# Patient Record
Sex: MALE | Race: WHITE | NOT HISPANIC OR LATINO | Employment: FULL TIME | URBAN - METROPOLITAN AREA
[De-identification: names, ages, dates, MRNs, and addresses within clinical notes are randomized per-mention and may not be internally consistent; named-entity substitution may affect disease eponyms.]

---

## 2021-10-02 ENCOUNTER — HOSPITAL ENCOUNTER (EMERGENCY)
Facility: HOSPITAL | Age: 31
Discharge: HOME | End: 2021-10-02
Attending: EMERGENCY MEDICINE

## 2021-10-02 VITALS
RESPIRATION RATE: 16 BRPM | HEART RATE: 87 BPM | DIASTOLIC BLOOD PRESSURE: 86 MMHG | OXYGEN SATURATION: 100 % | TEMPERATURE: 98.4 F | SYSTOLIC BLOOD PRESSURE: 126 MMHG

## 2021-10-02 DIAGNOSIS — R55 SYNCOPE: Primary | ICD-10-CM

## 2021-10-02 PROCEDURE — 99284 EMERGENCY DEPT VISIT MOD MDM: CPT

## 2021-10-02 PROCEDURE — 99285 EMERGENCY DEPT VISIT HI MDM: CPT | Performed by: EMERGENCY MEDICINE

## 2021-10-02 PROCEDURE — 93005 ELECTROCARDIOGRAM TRACING: CPT

## 2021-10-03 LAB
ATRIAL RATE: 84 BPM
P AXIS: 60 DEGREES
PR INTERVAL: 176 MS
QRS AXIS: 68 DEGREES
QRSD INTERVAL: 106 MS
QT INTERVAL: 338 MS
QTC INTERVAL: 399 MS
T WAVE AXIS: 63 DEGREES
VENTRICULAR RATE: 84 BPM

## 2021-10-03 PROCEDURE — 93010 ELECTROCARDIOGRAM REPORT: CPT | Performed by: INTERNAL MEDICINE

## 2022-11-17 ENCOUNTER — HOSPITAL ENCOUNTER (EMERGENCY)
Facility: HOSPITAL | Age: 32
Discharge: HOME | End: 2022-11-17
Attending: EMERGENCY MEDICINE

## 2022-11-17 VITALS
HEIGHT: 68 IN | BODY MASS INDEX: 21.98 KG/M2 | SYSTOLIC BLOOD PRESSURE: 138 MMHG | DIASTOLIC BLOOD PRESSURE: 90 MMHG | HEART RATE: 81 BPM | RESPIRATION RATE: 18 BRPM | OXYGEN SATURATION: 99 % | TEMPERATURE: 98.6 F | WEIGHT: 145 LBS

## 2022-11-17 DIAGNOSIS — K04.7 DENTAL INFECTION: Primary | ICD-10-CM

## 2022-11-17 RX ORDER — KETOROLAC TROMETHAMINE 30 MG/ML
15 INJECTION, SOLUTION INTRAMUSCULAR; INTRAVENOUS ONCE
Status: COMPLETED | OUTPATIENT
Start: 2022-11-17 | End: 2022-11-17

## 2022-11-17 RX ORDER — AMOXICILLIN 250 MG/1
500 CAPSULE ORAL ONCE
Status: COMPLETED | OUTPATIENT
Start: 2022-11-17 | End: 2022-11-17

## 2022-11-17 RX ORDER — AMOXICILLIN 500 MG/1
500 CAPSULE ORAL EVERY 12 HOURS SCHEDULED
Qty: 14 CAPSULE | Refills: 0 | Status: SHIPPED | OUTPATIENT
Start: 2022-11-17 | End: 2022-11-24

## 2022-11-17 RX ADMIN — KETOROLAC TROMETHAMINE 15 MG: 30 INJECTION, SOLUTION INTRAMUSCULAR; INTRAVENOUS at 21:52

## 2022-11-17 RX ADMIN — AMOXICILLIN 500 MG: 250 CAPSULE ORAL at 21:52

## 2022-11-18 NOTE — ED PROVIDER NOTES
History  Chief Complaint   Patient presents with   • Dental Pain     Left upper dental pain  Pt states he's had a cracked tooth "for awhile now" and it's been causing him pain since 11/16  800mg motrin taken at 45     30-year-old male presents for evaluation of acute on chronic left upper dental pain  Patient states this episode started 2 days ago  Tenderness on palpation  Reports taking ibuprofen a few hours ago with mild relief  Normal phonation  Denies fever, sore throat  None       No past medical history on file  No past surgical history on file  No family history on file  I have reviewed and agree with the history as documented  E-Cigarette/Vaping     E-Cigarette/Vaping Substances     Social History     Tobacco Use   • Smoking status: Every Day   • Smokeless tobacco: Never   Substance Use Topics   • Alcohol use: Never   • Drug use: Yes     Types: Marijuana       Review of Systems   Constitutional: Negative for fever  HENT: Positive for dental problem  All other systems reviewed and are negative  Physical Exam  Physical Exam  Vitals and nursing note reviewed  Constitutional:       General: He is not in acute distress  Appearance: He is well-developed  HENT:      Head: Normocephalic and atraumatic  Right Ear: External ear normal       Left Ear: External ear normal       Nose: Nose normal       Mouth/Throat:      Comments: Multiple dental caries  Left upper molar with severe decay  No evidence of palpable abscess  No submandibular or sublingual edema  Normal phonation  Posterior oropharynx clear, moist and symmetrical  Eyes:      General: No scleral icterus  Pulmonary:      Effort: Pulmonary effort is normal  No respiratory distress  Abdominal:      General: There is no distension  Palpations: Abdomen is soft  Musculoskeletal:         General: No deformity  Normal range of motion  Cervical back: Normal range of motion and neck supple     Skin: General: Skin is warm  Findings: No rash  Neurological:      General: No focal deficit present  Mental Status: He is alert  Gait: Gait normal    Psychiatric:         Mood and Affect: Mood normal          Vital Signs  ED Triage Vitals   Temperature Pulse Respirations Blood Pressure SpO2   11/17/22 2129 11/17/22 2129 11/17/22 2129 11/17/22 2129 11/17/22 2129   98 6 °F (37 °C) 81 18 138/90 99 %      Temp Source Heart Rate Source Patient Position - Orthostatic VS BP Location FiO2 (%)   11/17/22 2129 11/17/22 2129 11/17/22 2129 11/17/22 2129 --   Oral Monitor Sitting Right arm       Pain Score       11/17/22 2152       10 - Worst Possible Pain           Vitals:    11/17/22 2129   BP: 138/90   Pulse: 81   Patient Position - Orthostatic VS: Sitting         Visual Acuity      ED Medications  Medications   ketorolac (TORADOL) injection 15 mg (15 mg Intramuscular Given 11/17/22 2152)   amoxicillin (AMOXIL) capsule 500 mg (500 mg Oral Given 11/17/22 2152)       Diagnostic Studies  Results Reviewed     None                 No orders to display              Procedures  Procedures         ED Course                                             MDM  Number of Diagnoses or Management Options  Dental infection: new and requires workup  Diagnosis management comments: 34 yom with acute on chronic dental pain  Pain control, abx  FU with dentist  Luis Fernando Jhaveri discussed          Amount and/or Complexity of Data Reviewed  Clinical lab tests: reviewed  Tests in the medicine section of CPT®: ordered and reviewed  Decide to obtain previous medical records or to obtain history from someone other than the patient: yes  Review and summarize past medical records: yes        Disposition  Final diagnoses:   Dental infection     Time reflects when diagnosis was documented in both MDM as applicable and the Disposition within this note     Time User Action Codes Description Comment    11/17/2022  9:42 PM Silvino Baca Add [K04 7] Dental infection       ED Disposition     ED Disposition   Discharge    Condition   Stable    Date/Time   Thu Nov 17, 2022  9:42 PM    Comment   [address] [facility],[address] discharge to home/self care  Follow-up Information     Follow up With Specialties Details Why Contact Info Additional [address] [facility]   [address] [address]  50 [facility] Emergency Department Emergency Medicine  If symptoms worsen [address] [id number]  [address] [facility] Emergency Department, [address] [place] [address] Keyanna Berkowitz Luige Kem [address]          Discharge Medication List as of 11/17/2022  9:42 PM      START taking these medications    Details   amoxicillin (AMOXIL) 500 mg capsule Take 1 capsule (500 mg total) by mouth every 12 (twelve) hours for 7 days, Starting Thu 11/17/2022, Until Thu 11/24/2022, Normal             No discharge procedures on file      PDMP Review     None          ED Provider  Electronically Signed by           Deedee Mccarthy DO  11/17/22 8872

## 2023-10-26 ENCOUNTER — HOSPITAL ENCOUNTER (INPATIENT)
Facility: HOSPITAL | Age: 33
LOS: 1 days | Discharge: HOME | End: 2023-10-27
Attending: EMERGENCY MEDICINE | Admitting: INTERNAL MEDICINE
Payer: COMMERCIAL

## 2023-10-26 ENCOUNTER — APPOINTMENT (EMERGENCY)
Dept: CT IMAGING | Facility: HOSPITAL | Age: 33
End: 2023-10-26
Payer: COMMERCIAL

## 2023-10-26 VITALS
OXYGEN SATURATION: 100 % | RESPIRATION RATE: 16 BRPM | HEIGHT: 68 IN | TEMPERATURE: 97.3 F | BODY MASS INDEX: 20.46 KG/M2 | WEIGHT: 135 LBS | HEART RATE: 57 BPM | DIASTOLIC BLOOD PRESSURE: 81 MMHG | SYSTOLIC BLOOD PRESSURE: 127 MMHG

## 2023-10-26 DIAGNOSIS — K52.9 ILEITIS: ICD-10-CM

## 2023-10-26 DIAGNOSIS — K63.0: Primary | ICD-10-CM

## 2023-10-26 PROBLEM — F17.210 CIGARETTE NICOTINE DEPENDENCE WITHOUT COMPLICATION: Status: ACTIVE | Noted: 2023-10-26

## 2023-10-26 LAB
ALBUMIN SERPL BCP-MCNC: 4.4 G/DL (ref 3.5–5)
ALP SERPL-CCNC: 104 U/L (ref 34–104)
ALT SERPL W P-5'-P-CCNC: 24 U/L (ref 7–52)
ANION GAP SERPL CALCULATED.3IONS-SCNC: 5 MMOL/L
AST SERPL W P-5'-P-CCNC: 20 U/L (ref 13–39)
BACTERIA UR QL AUTO: NORMAL /HPF
BASOPHILS # BLD AUTO: 0.09 THOUSANDS/ÂΜL (ref 0–0.1)
BASOPHILS NFR BLD AUTO: 1 % (ref 0–1)
BILIRUB SERPL-MCNC: 0.61 MG/DL (ref 0.2–1)
BILIRUB UR QL STRIP: NEGATIVE
BUN SERPL-MCNC: 13 MG/DL (ref 5–25)
CALCIUM SERPL-MCNC: 9.7 MG/DL (ref 8.4–10.2)
CHLORIDE SERPL-SCNC: 103 MMOL/L (ref 96–108)
CLARITY UR: CLEAR
CO2 SERPL-SCNC: 29 MMOL/L (ref 21–32)
COLOR UR: YELLOW
CREAT SERPL-MCNC: 0.92 MG/DL (ref 0.6–1.3)
EOSINOPHIL # BLD AUTO: 0.33 THOUSAND/ÂΜL (ref 0–0.61)
EOSINOPHIL NFR BLD AUTO: 3 % (ref 0–6)
ERYTHROCYTE [DISTWIDTH] IN BLOOD BY AUTOMATED COUNT: 12.7 % (ref 11.6–15.1)
GFR SERPL CREATININE-BSD FRML MDRD: 108 ML/MIN/1.73SQ M
GLUCOSE SERPL-MCNC: 138 MG/DL (ref 65–140)
GLUCOSE UR STRIP-MCNC: NEGATIVE MG/DL
HCT VFR BLD AUTO: 41.4 % (ref 36.5–49.3)
HGB BLD-MCNC: 13.4 G/DL (ref 12–17)
HGB UR QL STRIP.AUTO: NEGATIVE
IMM GRANULOCYTES # BLD AUTO: 0.06 THOUSAND/UL (ref 0–0.2)
IMM GRANULOCYTES NFR BLD AUTO: 1 % (ref 0–2)
KETONES UR STRIP-MCNC: NEGATIVE MG/DL
LEUKOCYTE ESTERASE UR QL STRIP: NEGATIVE
LIPASE SERPL-CCNC: 18 U/L (ref 11–82)
LYMPHOCYTES # BLD AUTO: 2.56 THOUSANDS/ÂΜL (ref 0.6–4.47)
LYMPHOCYTES NFR BLD AUTO: 22 % (ref 14–44)
MCH RBC QN AUTO: 28 PG (ref 26.8–34.3)
MCHC RBC AUTO-ENTMCNC: 32.4 G/DL (ref 31.4–37.4)
MCV RBC AUTO: 86 FL (ref 82–98)
MONOCYTES # BLD AUTO: 1.09 THOUSAND/ÂΜL (ref 0.17–1.22)
MONOCYTES NFR BLD AUTO: 9 % (ref 4–12)
NEUTROPHILS # BLD AUTO: 7.44 THOUSANDS/ÂΜL (ref 1.85–7.62)
NEUTS SEG NFR BLD AUTO: 64 % (ref 43–75)
NITRITE UR QL STRIP: NEGATIVE
NON-SQ EPI CELLS URNS QL MICRO: NORMAL /HPF
NRBC BLD AUTO-RTO: 0 /100 WBCS
PH UR STRIP.AUTO: 6 [PH]
PLATELET # BLD AUTO: 370 THOUSANDS/UL (ref 149–390)
PMV BLD AUTO: 9.2 FL (ref 8.9–12.7)
POTASSIUM SERPL-SCNC: 3.8 MMOL/L (ref 3.5–5.3)
PROT SERPL-MCNC: 8 G/DL (ref 6.4–8.4)
PROT UR STRIP-MCNC: ABNORMAL MG/DL
RBC # BLD AUTO: 4.79 MILLION/UL (ref 3.88–5.62)
RBC #/AREA URNS AUTO: NORMAL /HPF
SODIUM SERPL-SCNC: 137 MMOL/L (ref 135–147)
SP GR UR STRIP.AUTO: >=1.03 (ref 1–1.03)
UROBILINOGEN UR STRIP-ACNC: <2 MG/DL
WBC # BLD AUTO: 11.57 THOUSAND/UL (ref 4.31–10.16)
WBC #/AREA URNS AUTO: NORMAL /HPF

## 2023-10-26 PROCEDURE — 96361 HYDRATE IV INFUSION ADD-ON: CPT

## 2023-10-26 PROCEDURE — 99285 EMERGENCY DEPT VISIT HI MDM: CPT | Performed by: PHYSICIAN ASSISTANT

## 2023-10-26 PROCEDURE — 80053 COMPREHEN METABOLIC PANEL: CPT | Performed by: PHYSICIAN ASSISTANT

## 2023-10-26 PROCEDURE — 96360 HYDRATION IV INFUSION INIT: CPT

## 2023-10-26 PROCEDURE — 74177 CT ABD & PELVIS W/CONTRAST: CPT

## 2023-10-26 PROCEDURE — 36415 COLL VENOUS BLD VENIPUNCTURE: CPT | Performed by: PHYSICIAN ASSISTANT

## 2023-10-26 PROCEDURE — 99222 1ST HOSP IP/OBS MODERATE 55: CPT | Performed by: INTERNAL MEDICINE

## 2023-10-26 PROCEDURE — 81001 URINALYSIS AUTO W/SCOPE: CPT | Performed by: PHYSICIAN ASSISTANT

## 2023-10-26 PROCEDURE — 85025 COMPLETE CBC W/AUTO DIFF WBC: CPT | Performed by: PHYSICIAN ASSISTANT

## 2023-10-26 PROCEDURE — 99284 EMERGENCY DEPT VISIT MOD MDM: CPT

## 2023-10-26 PROCEDURE — 99223 1ST HOSP IP/OBS HIGH 75: CPT | Performed by: INTERNAL MEDICINE

## 2023-10-26 PROCEDURE — G1004 CDSM NDSC: HCPCS

## 2023-10-26 PROCEDURE — 83690 ASSAY OF LIPASE: CPT | Performed by: PHYSICIAN ASSISTANT

## 2023-10-26 RX ORDER — ACETAMINOPHEN 325 MG/1
650 TABLET ORAL EVERY 6 HOURS PRN
Status: DISCONTINUED | OUTPATIENT
Start: 2023-10-26 | End: 2023-10-27 | Stop reason: HOSPADM

## 2023-10-26 RX ORDER — NICOTINE 21 MG/24HR
1 PATCH, TRANSDERMAL 24 HOURS TRANSDERMAL DAILY
Status: DISCONTINUED | OUTPATIENT
Start: 2023-10-26 | End: 2023-10-27 | Stop reason: HOSPADM

## 2023-10-26 RX ORDER — METRONIDAZOLE 500 MG/1
500 TABLET ORAL ONCE
Status: COMPLETED | OUTPATIENT
Start: 2023-10-26 | End: 2023-10-26

## 2023-10-26 RX ORDER — METRONIDAZOLE 500 MG/1
500 TABLET ORAL EVERY 8 HOURS SCHEDULED
Status: DISCONTINUED | OUTPATIENT
Start: 2023-10-26 | End: 2023-10-27 | Stop reason: HOSPADM

## 2023-10-26 RX ORDER — CEFTRIAXONE 1 G/50ML
1000 INJECTION, SOLUTION INTRAVENOUS EVERY 24 HOURS
Status: DISCONTINUED | OUTPATIENT
Start: 2023-10-27 | End: 2023-10-27 | Stop reason: HOSPADM

## 2023-10-26 RX ORDER — CEFTRIAXONE 1 G/50ML
1000 INJECTION, SOLUTION INTRAVENOUS ONCE
Status: COMPLETED | OUTPATIENT
Start: 2023-10-26 | End: 2023-10-26

## 2023-10-26 RX ORDER — SODIUM CHLORIDE 9 MG/ML
100 INJECTION, SOLUTION INTRAVENOUS CONTINUOUS
Status: DISCONTINUED | OUTPATIENT
Start: 2023-10-26 | End: 2023-10-27 | Stop reason: HOSPADM

## 2023-10-26 RX ADMIN — IOHEXOL 100 ML: 350 INJECTION, SOLUTION INTRAVENOUS at 11:38

## 2023-10-26 RX ADMIN — SODIUM CHLORIDE 100 ML/HR: 0.9 INJECTION, SOLUTION INTRAVENOUS at 17:13

## 2023-10-26 RX ADMIN — SODIUM CHLORIDE 1000 ML: 0.9 INJECTION, SOLUTION INTRAVENOUS at 10:50

## 2023-10-26 RX ADMIN — METRONIDAZOLE 500 MG: 500 TABLET ORAL at 21:54

## 2023-10-26 RX ADMIN — METRONIDAZOLE 500 MG: 500 TABLET ORAL at 14:59

## 2023-10-26 RX ADMIN — CEFTRIAXONE 1000 MG: 1 INJECTION, SOLUTION INTRAVENOUS at 14:59

## 2023-10-26 RX ADMIN — Medication 1 PATCH: at 17:10

## 2023-10-26 NOTE — PLAN OF CARE
Problem: PAIN - ADULT  Goal: Verbalizes/displays adequate comfort level or baseline comfort level  Description: Interventions:  - Encourage patient to monitor pain and request assistance  - Assess pain using appropriate pain scale  - Administer analgesics based on type and severity of pain and evaluate response  - Implement non-pharmacological measures as appropriate and evaluate response  - Consider cultural and social influences on pain and pain management  - Notify physician/advanced practitioner if interventions unsuccessful or patient reports new pain  Outcome: Progressing     Problem: INFECTION - ADULT  Goal: Absence or prevention of progression during hospitalization  Description: INTERVENTIONS:  - Assess and monitor for signs and symptoms of infection  - Monitor lab/diagnostic results  - Monitor all insertion sites, i.e. indwelling lines, tubes, and drains  - Monitor endotracheal if appropriate and nasal secretions for changes in amount and color  - [place] appropriate cooling/warming therapies per order  - Administer medications as ordered  - Instruct and encourage patient and family to use good hand hygiene technique  - Identify and instruct in appropriate isolation precautions for identified infection/condition  Outcome: Progressing     Problem: SAFETY ADULT  Goal: Patient will remain free of falls  Description: INTERVENTIONS:  - Educate patient/family on patient safety including physical limitations  - Instruct patient to call for assistance with activity   - Consult OT/PT to assist with strengthening/mobility   - Keep Call bell within reach  - Keep bed low and locked with side rails adjusted as appropriate  - Keep care items and personal belongings within reach  - Initiate and maintain comfort rounds  - Make Fall Risk Sign visible to staff  - Offer Toileting every 2 Hours, in advance of need  - Initiate/Maintain alarm  - Obtain necessary fall risk management equipment  - Apply yellow socks and bracelet for high fall risk patients  - Consider moving patient to room near nurses station  Outcome: Progressing  Goal: Maintain or return to baseline ADL function  Description: INTERVENTIONS:  -  Assess patient's ability to carry out ADLs; assess patient's baseline for ADL function and identify physical deficits which impact ability to perform ADLs (bathing, care of mouth/teeth, toileting, grooming, dressing, etc.)  - Assess/evaluate cause of self-care deficits   - Assess range of motion  - Assess patient's mobility; develop plan if impaired  - Assess patient's need for assistive devices and provide as appropriate  - Encourage maximum independence but intervene and supervise when necessary  - Involve family in performance of ADLs  - Assess for home care needs following discharge   - Consider OT consult to assist with ADL evaluation and planning for discharge  - Provide patient education as appropriate  Outcome: Progressing  Goal: Maintains/Returns to pre admission functional level  Description: INTERVENTIONS:  - Perform BMAT or MOVE assessment daily.   - Set and communicate daily mobility goal to care team and patient/family/caregiver. - Collaborate with rehabilitation services on mobility goals if consulted  - Perform Range of Motion 3 times a day. - Reposition patient every 3 hours.   - Dangle patient 3 times a day  - Stand patient 3 times a day  - Ambulate patient 3 times a day  - Out of bed to chair 3 times a day   - Out of bed for meals 3 times a day  - Out of bed for toileting  - Record patient progress and toleration of activity level   Outcome: Progressing     Problem: DISCHARGE PLANNING  Goal: Discharge to home or other facility with appropriate resources  Description: INTERVENTIONS:  - Identify barriers to discharge w/patient and caregiver  - Arrange for needed discharge resources and transportation as appropriate  - Identify discharge learning needs (meds, wound care, etc.)  - Arrange for interpretive services to assist at discharge as needed  - Refer to Case Management Department for coordinating discharge planning if the patient needs post-hospital services based on physician/advanced practitioner order or complex needs related to functional status, cognitive ability, or social support system  Outcome: Progressing     Problem: Knowledge Deficit  Goal: Patient/family/caregiver demonstrates understanding of disease process, treatment plan, medications, and discharge instructions  Description: Complete learning assessment and assess knowledge base.   Interventions:  - Provide teaching at level of understanding  - Provide teaching via preferred learning methods  Outcome: Progressing

## 2023-10-26 NOTE — ED PROVIDER NOTES
History  Chief Complaint   Patient presents with    Abdominal Pain     Patient presents to the ER with report of having low abdominal pain. Patient is a 79-year-old white male with no past medical history who reports 2-week history of mid lower abdominal pain. States the pain is intermittent and radiates to his rectum when he coughs. States he works as a . States the pain increased after doing drywall this past weekend. No fever or shaking chills. No nausea or vomiting. No urinary symptoms. No diarrhea or constipation. He reports he did have a colonoscopy at [facility] 3 years ago when he was having blood in his stools. Patient reports the results were "inconclusive". States he smokes marijuana every day. Denies other illicit drug use. Denies alcohol. Max half pack per day cigarettes. None       History reviewed. No pertinent past medical history. History reviewed. No pertinent surgical history. History reviewed. No pertinent family history. I have reviewed and agree with the history as documented. E-Cigarette/Vaping     E-Cigarette/Vaping Substances     Social History     Tobacco Use    Smoking status: Every Day     Packs/day: 0.50     Types: Cigarettes    Smokeless tobacco: Never   Substance Use Topics    Alcohol use: Never    Drug use: Yes     Types: Marijuana       Review of Systems   Constitutional:  Negative for chills and fever. HENT:  Negative for ear pain and sore throat. Eyes:  Negative for pain. Respiratory:  Negative for cough and shortness of breath. Cardiovascular:  Negative for chest pain and palpitations. Gastrointestinal:  Positive for abdominal pain. Negative for constipation, diarrhea, nausea and vomiting. Genitourinary:  Negative for dysuria and hematuria. Musculoskeletal:  Negative for arthralgias and back pain. Skin:  Negative for color change and rash. Neurological:  Negative for syncope.    All other systems reviewed and are negative. Physical Exam  Physical Exam  Vitals and nursing note reviewed. Constitutional:       General: He is not in acute distress. Appearance: He is well-developed. He is not ill-appearing, toxic-appearing or diaphoretic. HENT:      Head: Normocephalic and atraumatic. Mouth/Throat:      Mouth: Mucous membranes are moist.      Pharynx: Oropharynx is clear. Eyes:      Extraocular Movements: Extraocular movements intact. Pupils: Pupils are equal, round, and reactive to light. Cardiovascular:      Rate and Rhythm: Normal rate and regular rhythm. Heart sounds: Normal heart sounds. Pulmonary:      Effort: Pulmonary effort is normal.      Breath sounds: Normal breath sounds. Abdominal:      General: Abdomen is flat. Bowel sounds are normal.      Palpations: Abdomen is soft. Comments: Tenderness to deep palpation mid lower abdomen   Skin:     General: Skin is warm and dry. Capillary Refill: Capillary refill takes less than 2 seconds. Neurological:      Mental Status: He is alert and oriented to person, place, and time.          Vital Signs  ED Triage Vitals [10/26/23 1023]   Temperature Pulse Respirations Blood Pressure SpO2   98.2 °F (36.8 °C) 96 18 126/80 99 %      Temp Source Heart Rate Source Patient Position - Orthostatic VS BP Location FiO2 (%)   Oral Monitor Sitting Right arm --      Pain Score       --           Vitals:    10/26/23 1023   BP: 126/80   Pulse: 96   Patient Position - Orthostatic VS: Sitting         Visual Acuity      ED Medications  Medications   sodium chloride 0.9 % bolus 1,000 mL (1,000 mL Intravenous New Bag 10/26/23 1050)   iohexol (OMNIPAQUE) 350 MG/ML injection (MULTI-DOSE) 100 mL (100 mL Intravenous Given 10/26/23 1138)       Diagnostic Studies  Results Reviewed       Procedure Component Value Units Date/Time    Lipase [[id number]]  (Normal) Collected: 10/26/23 1050    Lab Status: Final result Specimen: Blood from Arm, Left Updated: 10/26/23 1119     Lipase 18 u/L     Comprehensive metabolic panel [[id number]] Collected: 10/26/23 1050    Lab Status: Final result Specimen: Blood from Arm, Left Updated: 10/26/23 1119     Sodium 137 mmol/L      Potassium 3.8 mmol/L      Chloride 103 mmol/L      CO2 29 mmol/L      ANION GAP 5 mmol/L      BUN 13 mg/dL      Creatinine 0.92 mg/dL      Glucose 138 mg/dL      Calcium 9.7 mg/dL      AST 20 U/L      ALT 24 U/L      Alkaline Phosphatase 104 U/L      Total Protein 8.0 g/dL      Albumin 4.4 g/dL      Total Bilirubin 0.61 mg/dL      eGFR 108 ml/min/1.73sq m     Narrative:      National Kidney Disease Foundation guidelines for Chronic Kidney Disease (CKD):     Stage 1 with normal or high GFR (GFR > 90 mL/min/1.73 square meters)    Stage 2 Mild CKD (GFR = 60-89 mL/min/1.73 square meters)    Stage 3A Moderate CKD (GFR = 45-59 mL/min/1.73 square meters)    Stage 3B Moderate CKD (GFR = 30-44 mL/min/1.73 square meters)    Stage 4 Severe CKD (GFR = 15-29 mL/min/1.73 square meters)    Stage 5 End Stage CKD (GFR <15 mL/min/1.73 square meters)  Note: GFR calculation is accurate only with a steady state creatinine    Urine Microscopic [[id number]]  (Normal) Collected: 10/26/23 1047    Lab Status: Final result Specimen: Urine, Clean Catch Updated: 10/26/23 1112     RBC, UA 0-1 /hpf      WBC, UA 1-2 /hpf      Epithelial Cells Occasional /hpf      Bacteria, UA Occasional /hpf     CBC and differential [[id number]]  (Abnormal) Collected: 10/26/23 1050    Lab Status: Final result Specimen: Blood from Arm, Left Updated: 10/26/23 1100     WBC 11.57 Thousand/uL      RBC 4.79 Million/uL      Hemoglobin 13.4 g/dL      Hematocrit 41.4 %      MCV 86 fL      MCH 28.0 pg      MCHC 32.4 g/dL      RDW 12.7 %      MPV 9.2 fL      Platelets 966 Thousands/uL      nRBC 0 /100 WBCs      Neutrophils Relative 64 %      Immat GRANS % 1 %      Lymphocytes Relative 22 %      Monocytes Relative 9 %      Eosinophils Relative 3 %      Basophils Relative 1 % Neutrophils Absolute 7.44 Thousands/µL      Immature Grans Absolute 0.06 Thousand/uL      Lymphocytes Absolute 2.56 Thousands/µL      Monocytes Absolute 1.09 Thousand/µL      Eosinophils Absolute 0.33 Thousand/µL      Basophils Absolute 0.09 Thousands/µL     UA w Reflex to Microscopic w Reflex to Culture [[id number]]  (Abnormal) Collected: 10/26/23 1047    Lab Status: Final result Specimen: Urine, Clean Catch Updated: 10/26/23 1059     Color, UA Yellow     Clarity, UA Clear     Specific Gravity, UA >=1.030     pH, UA 6.0     Leukocytes, UA Negative     Nitrite, UA Negative     Protein, UA Trace mg/dl      Glucose, UA Negative mg/dl      Ketones, UA Negative mg/dl      Urobilinogen, UA <2.0 mg/dl      Bilirubin, UA Negative     Occult Blood, UA Negative                   CT abdomen pelvis with contrast    by Jovani Veronica DO (10/26 1307)                 Procedures  Procedures         ED Course                               SBIRT 20yo+      Flowsheet Row Most Recent Value   Initial Alcohol Screen: US AUDIT-C     1. How often do you have a drink containing alcohol? 0 Filed at: 10/26/2023 1026   Audit-C Score 0 Filed at: 10/26/2023 1026   DHARA: How many times in the past year have you. .. Used an illegal drug or used a prescription medication for non-medical reasons? Never Filed at: 10/26/2023 1026                      Medical Decision Making  I have considered appendicitis, diverticulitis, IBS on my differential diagnosis. Labs reviewed. Patient with mild leukocytosis with white blood cell count of 11.57. CT abdomen/shows a bilobed abscess in the ileum with a fistulous tract. Radiologist contacted me to give me this result. States it appears too deep for IR intervention. Recommends GI and surgery consultation. I Carmi texted hospitalist for admission. I consulted general surgeon Dr. Clarissa Davis and gastroenterologist Dr. Leonard Quiros. IV Rocephin and Flagyl ordered.     Amount and/or Complexity of Data Reviewed  Labs: ordered. Radiology: ordered. Risk  Prescription drug management. Decision regarding hospitalization. Disposition  Final diagnoses:   None     ED Disposition       None          Follow-up Information    None         Patient's Medications    No medications on file       No discharge procedures on file.     PDMP Review       None            ED Provider  Electronically Signed by             Tavon Dahl PA-C  10/26/23 8324 Azathioprine Pregnancy And Lactation Text: This medication is Pregnancy Category D and isn't considered safe during pregnancy. It is unknown if this medication is excreted in breast milk.

## 2023-10-26 NOTE — ASSESSMENT & PLAN NOTE
Presented to the emergency department with worsening lower abdominal pain. Patient reports off-and-on abdominal pain for several years. Patient reports colonoscopy 3 years ago but pt reports was inconclusive  CT abdomen/pelvis: Terminal ileitis. Present on CT from 2019. Added finding of bilobed abscess collection with fistulous tract. Suggestive of Crohn's disease. Skip areas noted as well. No lizzie perforation or obstruction. Started on IV Rocephin/Flagyl - continue  Mild leukocytosis. Does not meet any SIRS criteria.   Afebrile  Consult general surgery and gastroenterology  Per ED discussion with IR, not amenable to drain  Discussed with GI - trial clear liquid diet

## 2023-10-26 NOTE — H&P
Fungal Skin Infection (Tinea)  A fungal infection is when too much fungus grows on or in the body. Fungus normally lives on the skin in small amounts and does not cause harm. But when too much grows on the skin, it causes an infection. This is also known as tinea. Fungal skin infections are common and not often serious.  The infection often starts as a small red area the size of a pea. The skin may turn dry and flaky. The area may itch. As the fungus grows, it spreads out in a red [place]. Because of how it looks, fungal skin infection is often called ringworm, but it is not caused by a worm. Fungal skin infections can occur on many parts of the body. They can grow on the head, chest, arms, or legs. They can occur on the buttocks. On the feet, fungal infection is known as “athlete’s foot.” It causes itchy, sometimes painful sores between the toes and the bottom or sides of the feet. In the groin, the rash is called “jock itch.”  People with weakened immune systems can get a fungal infection more easily. This includes people with diabetes or HIV, or who are being treated for cancer. In these cases, the fungal infection can spread and cause severe illness. Fungal infections are also more common in people who are obese.  In most cases, treatment is done with antifungal cream or ointment. If the infection is on your scalp, you may take oral medication. In some cases, a tiny piece of the skin (biopsy) may be taken. This is so it can be tested in a lab.  Common fungal infections are treated with creams on the skin or oral medicine.  Home care  Follow all instructions when using antifungal cream or ointment on your skin. The health care provider may advise using cornstarch powder to keep your skin dry or petroleum jelly to provide a barrier.  General care:  · If you were prescribed an oral medicine, read the patient information. Talk with the health care provider about the risks and side effects.  · Let your skin dry  [id number]  H&P  Name: Oscar Gonzalez 34 y.o. male I MRN: [id number]  Unit/Bed#: Z1 H1 I Date of Admission: 10/26/2023   Date of Service: 10/26/2023 I Hospital Day: 0      Assessment/Plan   * Intestinal abscess  Assessment & Plan  Presented to the emergency department with worsening lower abdominal pain. Patient reports off-and-on abdominal pain for several years. Patient reports colonoscopy 3 years ago but pt reports was inconclusive  CT abdomen/pelvis: Terminal ileitis. Present on CT from 2019. Added finding of bilobed abscess collection with fistulous tract. Suggestive of Crohn's disease. Skip areas noted as well. No lizzie perforation or obstruction. Started on IV Rocephin/Flagyl - continue  Mild leukocytosis. Does not meet any SIRS criteria. Afebrile  Consult general surgery and gastroenterology  Per ED discussion with IR, not amenable to drain  Discussed with GI - trial clear liquid diet    Cigarette nicotine dependence without complication  Assessment & Plan  Nicotine patch ordered       VTE Pharmacologic Prophylaxis: VTE Score: 2 Low Risk (Score 0-2) - Encourage Ambulation. Code Status: Level 1 - Full Code   Discussion with family: Patient declined call to . Anticipated Length of Stay: Patient will be admitted on an inpatient basis with an anticipated length of stay of greater than 2 midnights secondary to intestinal abscess. Total Time Spent on Date of Encounter in care of patient: 75 mins. This time was spent on one or more of the following: performing physical exam; counseling and coordination of care; obtaining or reviewing history; documenting in the medical record; reviewing/ordering tests, medications or procedures; communicating with other healthcare professionals and discussing with patient's family/caregivers.     Chief Complaint: Abdominal pain    History of Present Illness:  Oscar Gonzalez is a 34 y.o. male with a PMH of recurrent abdominal pain, nicotine use who presents with abdominal pain. Patient without known significant past medical history. Presented to the emergency department due to worsening lower abdominal pain. Patient states overall he has had intermittent abdominal pain for at least several years however worse in the last 2 weeks. Pain appears to be intermittent and at times radiates to his rectum. Currently denies chest pain/palpitations, shortness of breath, nausea/vomiting. Abdominal pain is well controlled. He has not had any issues with unintentional weight loss. Had a colonoscopy about 3 years ago and states the results had been 'inconclusive'. Denies recent constipation or diarrhea. Denies fever/chills. Denies bloody or melanotic stools. Review of Systems:  Review of Systems   Constitutional:  Negative for chills, fatigue, fever and unexpected weight change. HENT:  Negative for congestion, sore throat and trouble swallowing. Eyes:  Negative for photophobia, pain and visual disturbance. Respiratory:  Negative for cough, shortness of breath and wheezing. Cardiovascular:  Negative for chest pain, palpitations and leg swelling. Gastrointestinal:  Positive for abdominal pain. Negative for constipation, diarrhea, nausea and vomiting. Endocrine: Negative for polyuria. Genitourinary:  Negative for difficulty urinating, dysuria, flank pain, hematuria and urgency. Musculoskeletal:  Negative for back pain, myalgias, neck pain and neck stiffness. Skin:  Negative for pallor and rash. Neurological:  Negative for dizziness, tremors, syncope, speech difficulty, weakness, light-headedness and headaches. Hematological:  Does not bruise/bleed easily. Psychiatric/Behavioral:  Negative for agitation and confusion. Past Medical and Surgical History:   History reviewed. No pertinent past medical history. History reviewed.  No pertinent surgical completely after bathing. Carefully dry your feet and between your toes.  · Dress in loose cotton clothing.  · Don’t scratch the affected area. This can delay healing and may spread the infection. It can also cause a bacterial infection.  · Keep your skin clean, but don’t wash the skin too much. This can irritate your skin.  · Keep in mind that it may take a week before the fungus starts to go away. It can take 2 to 4 weeks to fully clear. To prevent it from coming back, use the medicine until the rash is all gone.  Follow-up care  Follow up with your health care provider if the rash does not get better after 10 days of treatment. Also follow up if the rash spreads to other parts of your body.  When to seek medical advice  Call your health care provider right away if any of these occur:  · Fever of 100.4°F (38°C) or higher  · Redness or swelling that gets worse  · Pain that gets worse  · Foul-smelling fluid leaking from the skin     © 4361-9104 [employer]. [address] [place] [address], [place] [address]. All rights reserved. This information is not intended as a substitute for professional medical care. Always follow your healthcare professional's instructions.         history. Meds/Allergies:  Prior to Admission medications    Not on File     I have reviewed home medications with patient personally. Allergies: Allergies   Allergen Reactions    Ammonia Nasal Congestion       Social History:  Marital Status: Single   Occupation: [employer]  Patient Pre-hospital Living Situation: Home  Patient Pre-hospital Level of Mobility: walks  Patient Pre-hospital Diet Restrictions:   Substance Use History:   Social History     Substance and Sexual Activity   Alcohol Use Never     Social History     Tobacco Use   Smoking Status Every Day    Packs/day: 0.50    Types: Cigarettes   Smokeless Tobacco Never     Social History     Substance and Sexual Activity   Drug Use Yes    Types: Marijuana       Family History:  History reviewed. No pertinent family history. Physical Exam:     Vitals:   Blood Pressure: 126/80 (10/26/23 1023)  Pulse: 96 (10/26/23 1023)  Temperature: 98.2 °F (36.8 °C) (10/26/23 1023)  Temp Source: Oral (10/26/23 1023)  Respirations: 18 (10/26/23 1023)  Height: 5' 8" (172.7 cm) (10/26/23 1023)  Weight - Scale: 61.2 kg (135 lb) (10/26/23 1023)  SpO2: 99 % (10/26/23 1023)    Physical Exam  Vitals and nursing note reviewed. Constitutional:       General: He is not in acute distress. Appearance: He is well-developed. Comments: No acute distress   HENT:      Head: Normocephalic and atraumatic. Eyes:      General: No scleral icterus. Extraocular Movements: Extraocular movements intact. Conjunctiva/sclera: Conjunctivae normal.   Cardiovascular:      Rate and Rhythm: Normal rate and regular rhythm. Heart sounds: No murmur heard. Pulmonary:      Effort: Pulmonary effort is normal. No respiratory distress. Breath sounds: Normal breath sounds. No wheezing, rhonchi or rales. Abdominal:      General: Bowel sounds are normal.      Palpations: Abdomen is soft. Tenderness:  There is abdominal tenderness in the right lower quadrant and periumbilical area. There is no guarding or rebound. Musculoskeletal:         General: No swelling. Cervical back: Normal range of motion. Comments: Able to move upper/lower extremities bilaterally, no edema   Skin:     General: Skin is warm and dry. Capillary Refill: Capillary refill takes less than 2 seconds. Neurological:      Mental Status: He is alert and oriented to person, place, and time. Psychiatric:         Mood and Affect: Mood normal.         Speech: Speech normal.         Behavior: Behavior normal.          Additional Data:     Lab Results:  Results from last 7 days   Lab Units 10/26/23  1050   WBC Thousand/uL 11.57*   HEMOGLOBIN g/dL 13.4   HEMATOCRIT % 41.4   PLATELETS Thousands/uL 370   NEUTROS PCT % 64   LYMPHS PCT % 22   MONOS PCT % 9   EOS PCT % 3     Results from last 7 days   Lab Units 10/26/23  1050   SODIUM mmol/L 137   POTASSIUM mmol/L 3.8   CHLORIDE mmol/L 103   CO2 mmol/L 29   BUN mg/dL 13   CREATININE mg/dL 0.92   ANION GAP mmol/L 5   CALCIUM mg/dL 9.7   ALBUMIN g/dL 4.4   TOTAL BILIRUBIN mg/dL 0.61   ALK PHOS U/L 104   ALT U/L 24   AST U/L 20   GLUCOSE RANDOM mg/dL 138                       Lines/Drains:  Invasive Devices       Peripheral Intravenous Line  Duration             Peripheral IV 10/26/23 Left Antecubital <1 day                        Imaging: Reviewed radiology reports from this admission including: abdominal/pelvic CT  CT abdomen pelvis with contrast   Final Result by Claude Kevin DO (10/26 9689)   Exam limited without oral contrast.      Terminal ileitis was mentioned on a prior outside CT from September 2019. Diffuse ileitis is seen on this CT with the added finding of an adjacent bilobed abscess collection with fistulous tract extending caudally back to abnormal mid ileum. The appearance is suggestive of but not diagnostic of Crohn's disease. There also    appear to be skip areas which would also raise concern for Crohn's disease.  No lizzie perforation or obstruction. *No formal diagnosis of Crohn's disease in patient's chart. Recommend surgical and GI consultations. I personally discussed this study with Gildardo Horan on 10/26/2023 1:17 PM.            Workstation performed: [id number]             EKG and Other Studies Reviewed on Admission:   EKG: No EKG obtained. ** Please Note: This note has been constructed using a voice recognition system.  **

## 2023-10-26 NOTE — CONSULTS
Consultation - General Surgery   Vannessa Roca 34 y.o. male MRN: [id number]  Unit/Bed#: Z1 H1 Encounter: [id number]               Assessment/Plan     Abdominal Pain  Terminal ileitis was mentioned on a prior outside CT from September 2019. Diffuse ileitis is seen on this CT with the added finding of an adjacent bilobed abscess collection with fistulous tract extending caudally back to abnormal mid ileum. The appearance is suggestive of but not diagnostic of Crohn's disease. There also   appear to be skip areas which would also raise concern for Crohn's disease. No lizzie perforation or obstruction. -67-year-old male with prior history of ileitis on CT scan and colonoscopy 2019 showed TI with inflammation, edema and ulceration. Biopsies were negative for colitis. No further GI follow-up since then.  -antibiotics for abscess  -diet as per GI  -IVF  -pain control  -needs further workup, patient has findings noemi[icious for Crohn's disease        History of Present Illness    Admit Date:  10/26/2023  Hospital Day:  0 days  Primary Service:  Hospitalist  Attending Provider:  Lisa Pantoja MD  Physician Requesting Consult: Lisa Pantoja MD    HPI: Vannessa Roca is a 34y.o. year old male who presents with abdominal pain. Patient reports   prior melena and he underwent colonoscopy in 2019. He says it was inconclusive. He reports he has chronic abdominal pain. 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 He reports it is in Right to mid lower quadrants. No nausea or vomiting  Denies trouble with tolerating diet or prior issues with constipation or diarrhea    Inpatient consult to Acute Care Surgery  Consult performed by: Margaret Pringle PA-C  Consult ordered by: Bob Nieto PA-C          Review of Systems       CONSTITUTIONAL: Denies any fever, chills, rigors, and weight loss. HEENT: No facial trauma, earache or tinnitus.  Denies hearing loss or visual disturbances. CARDIOVASCULAR: No chest pain or palpitations. RESPIRATORY: Denies any cough, hemoptysis, shortness of breath or dyspnea on exertion. GASTROINTESTINAL: see hpi  GENITOURINARY: No problems with urination. Denies any hematuria or dysuria. NEUROLOGIC:  negative for dizziness, weakness, vertigo, denies headaches. MUSCULOSKELETAL: Denies any muscle or joint pain. SKIN: Denies skin rashes or itching. ENDOCRINE: Denies excessive thirst. Denies intolerance to heat or cold. Historical Information   History reviewed. No pertinent past medical history. History reviewed. No pertinent surgical history. Social History   Social History     Substance and Sexual Activity   Alcohol Use Never     Social History     Substance and Sexual Activity   Drug Use Yes    Types: Marijuana     E-Cigarette/Vaping     E-Cigarette/Vaping Substances     Social History     Tobacco Use   Smoking Status Every Day    Packs/day: 0.50    Types: Cigarettes   Smokeless Tobacco Never     Family History: History reviewed. No pertinent family history.     Meds/Allergies   current meds:   Current Facility-Administered Medications   Medication Dose Route Frequency    acetaminophen (TYLENOL) tablet 650 mg  650 mg Oral Q6H PRN    [START ON 10/27/2023] cefTRIAXone (ROCEPHIN) IVPB (premix in dextrose) 1,000 mg 50 mL  1,000 mg Intravenous Q24H    metroNIDAZOLE (FLAGYL) tablet 500 mg  500 mg Oral Q8H [address]    nicotine (NICODERM CQ) 14 mg/24hr TD 24 hr patch 1 patch  1 patch Transdermal Daily    sodium chloride 0.9 % infusion  100 mL/hr Intravenous Continuous         Allergies   Allergen Reactions    Ammonia Nasal Congestion       Objective   Temp:  [98.2 °F (36.8 °C)] 98.2 °F (36.8 °C)  HR:  [96] 96  Resp:  [18] 18  BP: (126)/(80) 126/80  No intake or output data in the 24 hours ending 10/26/23 1513    Physical Exam    General appearance: alert and oriented, in no acute distress  Head: Normocephalic, without obvious abnormality, atraumatic, sclerae anicteric, mucous membranes moist  Neck: no JVD and supple, symmetrical, trachea midline  Lungs: clear to auscultation, no wheezes or rales  Heart:   regular rate, regular rhythm, S1-S2 normal, no murmur  Abdomen: soft, tenderness to palpation, no guarding, no distention  Extremities:   No edema, redness or tenderness in the calves or thighs  Skin: Warm, dry    Nursing notes and vital signs reviewed    Imaging Studies: I have personally reviewed pertinent reports. EKG, Pathology, and Other Studies: I have personally reviewed pertinent reports. Counseling / Coordination of Care  Total floor / unit time spent today 15 minutes. Greater than 50% of total time was spent with the patient and / or family counseling and / or coordination of care.  A description of the counseling / coordination of care: 30 mins

## 2023-10-26 NOTE — CONSULTS
Consultation - GI   Rickie De Leon 34 y.o. male MRN: [id number]  Unit/Bed#: Z1 H2 Encounter: [id number]      Assessment/Plan     28-year-old male with prior history of ileitis on CT scan and colonoscopy 2019 showed TI with inflammation, edema and ulceration. Biopsies were negative for colitis. No further GI follow-up since then. Patient presented to ED with 4-5-day history of right lower abdominal pain which became worse today while at work as a   CT scan in ED revealed diffuse ileitis with adjacent bilobed abscess collection with fistulous track extending back to mid ileum. Findings suggestive of Crohn's disease  Abscess not amenable to IR drainage per radiology  WBC elevated at 11.7    1. Abdominal pain  2. Intra-abdominal abscess on CT scan  3. Ileitis  Patient presented to ED with 4 to 5-day history of right lower abdominal pain radiating to rectum. Experienced sharp discomfort today with stepping off of ladder  Denies recent acute change in bowel habits-has irregular bowel patterns with loose stools alternating with formed BMs. Denies recent bloody diarrhea, hematochezia  No fever chills  No unintentional weight loss  CT scan with diffuse ileitis and adjacent abscess with fistula tract to ileum.   Abscess not amenable to IR drainage per radiology  Findings highly suspicious for Crohn's disease  -Recommend conservative medical management for now  -Agree with surgical consult  -IV Rocephin and Flagyl  -Clear liquids for now  -Trend CBC, temperature  -Consider MRE to reevaluate abscess in 5 to 7 days  -will need outpatient follow-up with colonoscopy once abscess treated        Inpatient consult to gastroenterology  Consult performed by: MARLIN Sosa  Consult ordered by: Russ Magana PA-C        Physician Requesting Consult: Kady Vaz MD  Reason for Consult / Principal Problem: Small bowel abscess on CT scan    HPI: Rickie De Leon is a 34y.o. year old male with prior history of ileitis on CT scan. Reports previous GI evaluation for possible Crohn's disease with Hilario Mahmood in 2019 after CT scan showed terminal ileitis. Records obtained and reviewed   Colonoscopy was performed 10/29/2019-sigmoid colon appeared boggy and edematous descending, transverse, cecum and ascending colon were normal.  Terminal ileum was intubated, multiple ulcers noted, punctate hemorrhagic lesions, edema and erythema. Biopsies were negative for acute inflammation/colitis. MRE along with stool cultures to rule out Giardiasis were recommended however patient never completed the studies or followed up with GI    Presented to ED with acute right-sided abdominal pain. Patient gives vague history of chronic self-limiting diffuse abdominal pain which he has been experiencing for several years. However over the past 4 to 5 days, he has been experiencing sharp intermittent right-sided abdominal pain. Today while stepping off of a ladder at work, again experienced very sharp right-sided abdominal pain which radiated to rectal area  He denies recent fever or chills. CT scan of the abdomen with contrast in the ED showed diffuse ileitis with adjacent bilobed abscess above ileum in central pelvis which appears to communicate back to ileum. Skipped lesions noted in proximal small bowel. Suggestive of Crohn's disease, no lizzie perforation or obstruction  CBC in ED showed WBCs slightly elevated at 11.57, hemoglobin normal at 13.4    Denies recent acute change in bowel habits-reports irregular bowel pattern depending on his diet. Does occasionally have loose bowel movements up to 4-5 bowel movements per day alternating with formed stool 1 time per day  Denies recent hematochezia but does report occasional BRB with wiping due to hemorrhoids.   Occasional mucousy stools  No nausea or vomiting, appetite has been good and he denies recent unintentional weight loss    Smokes 10 cigarettes daily  Daily marijuana use  Denies alcohol use      Review of Systems   Constitutional:  Negative for unexpected weight change. HENT: Negative. Respiratory: Negative. Cardiovascular: Negative. Gastrointestinal:  Positive for abdominal pain. Genitourinary: Negative. Musculoskeletal: Negative. Skin: Negative. Neurological: Negative. Hematological: Negative. Psychiatric/Behavioral: Negative. Historical Information   History reviewed. No pertinent past medical history. History reviewed. No pertinent surgical history. Social History   Social History     Substance and Sexual Activity   Alcohol Use Never     Social History     Substance and Sexual Activity   Drug Use Yes    Types: Marijuana     Social History     Tobacco Use   Smoking Status Every Day    Packs/day: 0.50    Types: Cigarettes   Smokeless Tobacco Never     History reviewed. No pertinent family history. Meds/Allergies   Current Facility-Administered Medications   Medication Dose Route Frequency    cefTRIAXone (ROCEPHIN) IVPB (premix in dextrose) 1,000 mg 50 mL  1,000 mg Intravenous Once    metroNIDAZOLE (FLAGYL) tablet 500 mg  500 mg Oral Once     (Not in a hospital admission)      Allergies   Allergen Reactions    Ammonia Nasal Congestion           Physical Exam   Constitutional: Alert and oriented x3, NAD  HENT: WNL  Head: Normocephalic and atraumatic. Eyes: Anicteric  Neck: Normal range of motion. Neck supple. Cardiovascular: Normal rate and regular rhythm. S1 and S2 normal, no murmur rub or gallop  Pulmonary/Chest: Effort normal and breath sounds normal.   Abdominal: Soft, nondistended, tender with palpation at right lower quadrant and right mid abdomen. Bowel sounds are normal.   Musculoskeletal: Normal range of motion. Extremities:  No edema  Neurological: alert and oriented to person, place, and time. Skin: Skin is warm and dry. Psychiatric:normal mood and affect.        Lab Results: Admission on 10/26/2023   Component Date Value    WBC 10/26/2023 11.57 (H)     RBC 10/26/2023 4.79     Hemoglobin 10/26/2023 13.4     Hematocrit 10/26/2023 41.4     MCV 10/26/2023 86     MCH 10/26/2023 28.0     MCHC 10/26/2023 32.4     RDW 10/26/2023 12.7     MPV 10/26/2023 9.2     Platelets 92/11/4072 370     nRBC 10/26/2023 0     Neutrophils Relative 10/26/2023 64     Immat GRANS % 10/26/2023 1     Lymphocytes Relative 10/26/2023 22     Monocytes Relative 10/26/2023 9     Eosinophils Relative 10/26/2023 3     Basophils Relative 10/26/2023 1     Neutrophils Absolute 10/26/2023 7.44     Immature Grans Absolute 10/26/2023 0.06     Lymphocytes Absolute 10/26/2023 2.56     Monocytes Absolute 10/26/2023 1.09     Eosinophils Absolute 10/26/2023 0.33     Basophils Absolute 10/26/2023 0.09     Sodium 10/26/2023 137     Potassium 10/26/2023 3.8     Chloride 10/26/2023 103     CO2 10/26/2023 29     ANION GAP 10/26/2023 5     BUN 10/26/2023 13     Creatinine 10/26/2023 0.92     Glucose 10/26/2023 138     Calcium 10/26/2023 9.7     AST 10/26/2023 20     ALT 10/26/2023 24     Alkaline Phosphatase 10/26/2023 104     Total Protein 10/26/2023 8.0     Albumin 10/26/2023 4.4     Total Bilirubin 10/26/2023 0.61     eGFR 10/26/2023 108     Lipase 10/26/2023 18     Color, UA 10/26/2023 Yellow     Clarity, UA 10/26/2023 Clear     Specific Gravity, UA 10/26/2023 >=1.030     pH, UA 10/26/2023 6.0     Leukocytes, UA 10/26/2023 Negative     Nitrite, UA 10/26/2023 Negative     Protein, UA 10/26/2023 Trace (A)     Glucose, UA 10/26/2023 Negative     Ketones, UA 10/26/2023 Negative     Urobilinogen, UA 10/26/2023 <2.0     Bilirubin, UA 10/26/2023 Negative     Occult Blood, UA 10/26/2023 Negative     RBC, UA 10/26/2023 0-1     WBC, UA 10/26/2023 1-2     Epithelial Cells 10/26/2023 Occasional     Bacteria, UA 10/26/2023 Occasional      Imaging Studies: I have personally reviewed pertinent reports.       EKG, Pathology, and Other Studies: I have personally reviewed pertinent reports. Counseling / Coordination of Care  Total floor / unit time spent today 45 minutes.

## 2023-10-27 LAB
ANION GAP SERPL CALCULATED.3IONS-SCNC: 4 MMOL/L
BASOPHILS # BLD AUTO: 0.09 THOUSANDS/ÂΜL (ref 0–0.1)
BASOPHILS NFR BLD AUTO: 1 % (ref 0–1)
BUN SERPL-MCNC: 7 MG/DL (ref 5–25)
CALCIUM SERPL-MCNC: 8.8 MG/DL (ref 8.4–10.2)
CHLORIDE SERPL-SCNC: 108 MMOL/L (ref 96–108)
CO2 SERPL-SCNC: 28 MMOL/L (ref 21–32)
CREAT SERPL-MCNC: 0.84 MG/DL (ref 0.6–1.3)
EOSINOPHIL # BLD AUTO: 0.67 THOUSAND/ÂΜL (ref 0–0.61)
EOSINOPHIL NFR BLD AUTO: 6 % (ref 0–6)
ERYTHROCYTE [DISTWIDTH] IN BLOOD BY AUTOMATED COUNT: 13 % (ref 11.6–15.1)
GFR SERPL CREATININE-BSD FRML MDRD: 115 ML/MIN/1.73SQ M
GLUCOSE SERPL-MCNC: 97 MG/DL (ref 65–140)
HCT VFR BLD AUTO: 38.8 % (ref 36.5–49.3)
HGB BLD-MCNC: 12.3 G/DL (ref 12–17)
IMM GRANULOCYTES # BLD AUTO: 0.03 THOUSAND/UL (ref 0–0.2)
IMM GRANULOCYTES NFR BLD AUTO: 0 % (ref 0–2)
LYMPHOCYTES # BLD AUTO: 2.49 THOUSANDS/ÂΜL (ref 0.6–4.47)
LYMPHOCYTES NFR BLD AUTO: 23 % (ref 14–44)
MCH RBC QN AUTO: 28 PG (ref 26.8–34.3)
MCHC RBC AUTO-ENTMCNC: 31.7 G/DL (ref 31.4–37.4)
MCV RBC AUTO: 88 FL (ref 82–98)
MONOCYTES # BLD AUTO: 1.18 THOUSAND/ÂΜL (ref 0.17–1.22)
MONOCYTES NFR BLD AUTO: 11 % (ref 4–12)
NEUTROPHILS # BLD AUTO: 6.34 THOUSANDS/ÂΜL (ref 1.85–7.62)
NEUTS SEG NFR BLD AUTO: 59 % (ref 43–75)
NRBC BLD AUTO-RTO: 0 /100 WBCS
PLATELET # BLD AUTO: 334 THOUSANDS/UL (ref 149–390)
PMV BLD AUTO: 9.2 FL (ref 8.9–12.7)
POTASSIUM SERPL-SCNC: 4.3 MMOL/L (ref 3.5–5.3)
RBC # BLD AUTO: 4.4 MILLION/UL (ref 3.88–5.62)
SODIUM SERPL-SCNC: 140 MMOL/L (ref 135–147)
WBC # BLD AUTO: 10.8 THOUSAND/UL (ref 4.31–10.16)

## 2023-10-27 PROCEDURE — 99232 SBSQ HOSP IP/OBS MODERATE 35: CPT | Performed by: INTERNAL MEDICINE

## 2023-10-27 PROCEDURE — 99239 HOSP IP/OBS DSCHRG MGMT >30: CPT | Performed by: PHYSICIAN ASSISTANT

## 2023-10-27 PROCEDURE — 85025 COMPLETE CBC W/AUTO DIFF WBC: CPT | Performed by: INTERNAL MEDICINE

## 2023-10-27 PROCEDURE — RECHECK: Performed by: PHYSICIAN ASSISTANT

## 2023-10-27 PROCEDURE — 99231 SBSQ HOSP IP/OBS SF/LOW 25: CPT | Performed by: PHYSICIAN ASSISTANT

## 2023-10-27 PROCEDURE — 80048 BASIC METABOLIC PNL TOTAL CA: CPT | Performed by: INTERNAL MEDICINE

## 2023-10-27 RX ORDER — CIPROFLOXACIN 500 MG/1
500 TABLET, FILM COATED ORAL EVERY 12 HOURS SCHEDULED
Qty: 20 TABLET | Refills: 0 | Status: SHIPPED | OUTPATIENT
Start: 2023-10-27 | End: 2023-11-06

## 2023-10-27 RX ORDER — METRONIDAZOLE 500 MG/1
500 TABLET ORAL EVERY 8 HOURS SCHEDULED
Qty: 30 TABLET | Refills: 0 | Status: SHIPPED | OUTPATIENT
Start: 2023-10-27 | End: 2023-11-06

## 2023-10-27 RX ADMIN — METRONIDAZOLE 500 MG: 500 TABLET ORAL at 13:41

## 2023-10-27 RX ADMIN — CEFTRIAXONE 1000 MG: 1 INJECTION, SOLUTION INTRAVENOUS at 12:52

## 2023-10-27 RX ADMIN — METRONIDAZOLE 500 MG: 500 TABLET ORAL at 05:47

## 2023-10-27 RX ADMIN — SODIUM CHLORIDE 100 ML/HR: 0.9 INJECTION, SOLUTION INTRAVENOUS at 03:22

## 2023-10-27 RX ADMIN — Medication 1 PATCH: at 09:00

## 2023-10-27 NOTE — DISCHARGE INSTR - AVS FIRST PAGE
Follow-up with gastroenterology as outpatient. Their office will contact you for follow-up  Continue ciprofloxacin and Flagyl for additional 10 days to complete antibiotic course    Return to the emergency department for further evaluation with any chest pain/palpitations, shortness of breath, nausea/vomiting, worsening abdominal pain, fever/chills.

## 2023-10-27 NOTE — CASE MANAGEMENT
Case Management Discharge Planning Note    Patient name Vega Matta  Location /-01 MRN [id number]  : 1990 Date 10/27/2023       Current Admission Date: 10/26/2023  Current Admission Diagnosis:Intestinal abscess   Patient Active Problem List    Diagnosis Date Noted    Intestinal abscess 10/26/2023    Cigarette nicotine dependence without complication       LOS (days): 1  Geometric Mean LOS (GMLOS) (days):   Days to GMLOS:     OBJECTIVE:  Risk of Unplanned Readmission Score: 5.59         Current admission status: Inpatient   Preferred Pharmacy:   [address] [facility], [address] [place] [address]  Phone: [phone] Fax: [phone]    Primary Care Provider: No primary care provider on file. Primary Insurance: BLUE CROSS  Secondary Insurance:     DISCHARGE DETAILS:           Patient is indp and has no DME. No home needs anticipated. SO will transport home.

## 2023-10-27 NOTE — CASE MANAGEMENT
Case Management Discharge Planning Note    Patient name Bhanu Vivar  Location /-01 MRN [id number]  : 1990 Date 10/27/2023       Current Admission Date: 10/26/2023  Current Admission Diagnosis:Intestinal abscess   Patient Active Problem List    Diagnosis Date Noted    Intestinal abscess 10/26/2023    Cigarette nicotine dependence without complication       LOS (days): 1  Geometric Mean LOS (GMLOS) (days):   Days to GMLOS:     OBJECTIVE:  Risk of Unplanned Readmission Score: 5.64         Current admission status: Inpatient   Preferred Pharmacy:   [address] [facility], [address] [place] [address]  Phone: [phone] Fax: [phone]    Primary Care Provider: No primary care provider on file. Primary Insurance:   Secondary Insurance:     DISCHARGE DETAILS:     Pt does not have insurance.  CM requested a referral to [facility] be made via [facility]

## 2023-10-27 NOTE — ASSESSMENT & PLAN NOTE
Presented to the emergency department with worsening lower abdominal pain. Patient reports off-and-on abdominal pain for several years. Patient reports colonoscopy 3 years ago but pt reports was inconclusive  CT abdomen/pelvis: Terminal ileitis. Present on CT from 2019. Added finding of bilobed abscess collection with fistulous tract. Suggestive of Crohn's disease. Skip areas noted as well. No lizzie perforation or obstruction. Started on IV Rocephin/Flagyl - continue  Mild leukocytosis. Does not meet any SIRS criteria.   Afebrile  Consult general surgery and gastroenterology  Per ED discussion with IR, not amenable to drain  General surgery and gastroenterology following  Diet advancement per GI

## 2023-10-27 NOTE — PROGRESS NOTES
Progress note - Gastroenterology   Vannessa Roca 34 y.o. male MRN: [id number]  Unit/Bed#: -01 Encounter: [id number]    ASSESSMENT and PLAN  69-year-old male with prior history of ileitis on CT scan and colonoscopy 2019 showed TI with inflammation, edema and ulceration. Biopsies were negative for colitis. No further GI follow-up since then  Presented to ED with 4-5-day history of worsening lower abdominal pain  CT scan in ED revealed diffuse ileitis with adjacent bilobed abscess collection and fistulous track extending back to mid ileum. Findings suggestive of Crohn's disease  Per radiology, abscess not amenable to IR drainage  WBC elevated 11.7    1. Abdominal pain  2. Intra-abdominal abscess on CT scan  3. Ileitis  Abdominal pain improved today, less tender with palpation  WBCs decreased to 10.8  No BM, no melena or rectal bleeding  Appreciate surgical evaluation, no indication for surgery at this time  -Continue IV Rocephin and Flagyl  -Okay to advance to low fiber diet  -Trend CBC  -Consider MR HARPER to reevaluate abscess in 5 to 7 days  - will need outpatient follow-up with colonoscopy once abscess treated    Chief Complaint   Patient presents with    Abdominal Pain     Patient presents to the ER with report of having low abdominal pain. SUBJECTIVE/HPI   Feels better this a.m.   Abdominal pain improved-less tender with palpation  Bowel movement, no rectal bleeding  White count 10.8 today    /81   Pulse 57   Temp (!) 97.3 °F (36.3 °C)   Resp 16   Ht 5' 8" (1.727 m)   Wt 61.2 kg (135 lb)   SpO2 100%   BMI 20.53 kg/m²     PHYSICALEXAM  General appearance: alert, appears trouble  Eyes:  no icterus   Head: Normocephalic, without obvious abnormality, atraumatic  Lungs: clear to auscultation bilaterally  Heart: regular rate and rhythm, S1, S2 normal, no murmur, click, rub or gallop  Abdomen: soft, nondistended, less tender with palpation bowel sounds normal, no BM since admission  Extremities: extremities normal, atraumatic, no cyanosis or edema  Neurologic: Grossly normal    Lab Results   Component Value Date    CALCIUM 8.8 10/27/2023    K 4.3 10/27/2023    CO2 28 10/27/2023     10/27/2023    BUN 7 10/27/2023    CREATININE 0.84 10/27/2023     Lab Results   Component Value Date    WBC 10.80 (H) 10/27/2023    HGB 12.3 10/27/2023    HCT 38.8 10/27/2023    MCV 88 10/27/2023     10/27/2023     Lab Results   Component Value Date    ALT 24 10/26/2023    AST 20 10/26/2023    ALKPHOS 104 10/26/2023     No results found for: "AMYLASE"  Lab Results   Component Value Date    LIPASE 18 10/26/2023

## 2023-10-27 NOTE — UTILIZATION REVIEW
NOTIFICATION OF INPATIENT ADMISSION   AUTHORIZATION REQUEST   SERVICING FACILITY:   [address] [place] [address]  [address] [facility],Third Floor [address]  Tax ID: [id number]  NPI: [id number] ATTENDING PROVIDER:  Attending Name and NPI#: Gracie Chen Md [[id number]]  Address: [facility] [address] [facility],Third Floor [address]  Phone: [phone]   ADMISSION INFORMATION:  Place of Service: [address] [place] [address]  Place of Service Code: 21  Inpatient Admission Date/Time: 10/26/23  1:56 PM  Discharge Date/Time: No discharge date for patient encounter. Admitting Diagnosis Code/Description:  Ileitis [K52.9]  Abdominal pain [R10.9]  Abscess, intestinal [K63.0]     UTILIZATION REVIEW CONTACT:  Twyla Stewart Utilization   Network Utilization Review Department  Phone: [phone]  Fax [phone]  Email: [email or web address]  Contact for approvals/pending authorizations, clinical reviews, and discharge. PHYSICIAN ADVISORY SERVICES:  Medical Necessity Denial & Ktxw-up-Dgls Review  Phone: [phone]  Fax: [phone]  Email: [email or web address]     DISCHARGE SUPPORT TEAM:  For Patients Discharge Needs & Updates  Phone: [phone] opt. 2 Fax: [phone]  Email: [email or web address]

## 2023-10-27 NOTE — UTILIZATION REVIEW
Initial Clinical Review    Admission: Date/Time/Statement:   Admission Orders (From admission, onward)       Ordered        10/26/23 1356  INPATIENT ADMISSION  Once                          Orders Placed This Encounter   Procedures    INPATIENT ADMISSION     Standing Status:   Standing     Number of Occurrences:   1     Order Specific Question:   Level of Care     Answer:   Med Surg [16]     Order Specific Question:   Estimated length of stay     Answer:   More than 2 Midnights     Order Specific Question:   Certification     Answer:   I certify that inpatient services are medically necessary for this patient for a duration of greater than two midnights. See H&P and MD Progress Notes for additional information about the patient's course of treatment. ED Arrival Information       Expected   -    Arrival   10/26/2023 10:13    Acuity   Urgent              Means of arrival   Walk-In    Escorted by   Self    Service   Hospitalist    Admission type   Emergency              Arrival complaint   Abdominal pain             Chief Complaint   Patient presents with    Abdominal Pain     Patient presents to the ER with report of having low abdominal pain. Initial Presentation: 34 y.o. male  from home to ED admitted inpatient due to intestinal abscess. No PMH. Presented due to persistent abdominal pain starting 2 weeks prior to arrival.   Mid to lower abdominal pain  that radiates to rectum with cough. On exam: tenderness to deep palpation mid lower abdomen. Wbc 11.57. CT abdomen/shows a bilobed abscess in the ileum with a fistulous tract. In the ED given  1 liter IVF, started on rocephin and flagyl. Plan is  consult GI and surgery. Continue antibiotics, pain control and IVF. 10/26/32 per GI - patient with abdominal pain/intra-abdominal abscess on ct/Ileitis. Per radiology, abscess not amenable to IR drainage. Plan is surgical consult, IV rocephin and flagyl. Clears.       10/26/23 per surgery - patient with terminal ileitis, likely Crohn's with abscess. Recommend NPO, IVF, no obvious window for IR drainage. GI consult. Date: 10/27/23   Day 2: tolerating clears. No BM. Abdominal pain improved. On exam less tender on palpation of abdomen. Wbc 10.8. IV antibiotics and IVF in progress. ED Triage Vitals   Temperature Pulse Respirations Blood Pressure SpO2   10/26/23 1023 10/26/23 1023 10/26/23 1023 10/26/23 1023 10/26/23 1023   98.2 °F (36.8 °C) 96 18 126/80 99 %      Temp Source Heart Rate Source Patient Position - Orthostatic VS BP Location FiO2 (%)   10/26/23 1023 10/26/23 1023 10/26/23 1023 10/26/23 1023 --   Oral Monitor Sitting Right arm       Pain Score       10/26/23 1655       2          Wt Readings from Last 1 Encounters:   10/26/23 61.2 kg (135 lb)     Additional Vital Signs:   0/26/23 21:59:22 97.3 °F (36.3 °C) Abnormal  57 -- 127/81 96 100 % -- --   10/26/23 16:55:39 97.5 °F (36.4 °C) 63 16 113/78 90 100 %       Pertinent Labs/Diagnostic Test Results:   CT abdomen pelvis with contrast   Final Result by Jodi Duarte DO (10/26 1317)   Exam limited without oral contrast.      Terminal ileitis was mentioned on a prior outside CT from September 2019. Diffuse ileitis is seen on this CT with the added finding of an adjacent bilobed abscess collection with fistulous tract extending caudally back to abnormal mid ileum. The appearance is suggestive of but not diagnostic of Crohn's disease. There also    appear to be skip areas which would also raise concern for Crohn's disease. No lizzie perforation or obstruction. *No formal diagnosis of Crohn's disease in patient's chart. Recommend surgical and GI consultations.       I personally discussed this study with Mikel Truong on 10/26/2023 1:17 PM.            Workstation performed: [id number]             Results from last 7 days   Lab Units 10/27/23  0314 10/26/23  1050   WBC Thousand/uL 10.80* 11.57*   HEMOGLOBIN g/dL 12.3 13.4   HEMATOCRIT % 38.8 41.4   PLATELETS Thousands/uL 334 370   NEUTROS ABS Thousands/µL 6.34 7.44     Results from last 7 days   Lab Units 10/27/23  0314 10/26/23  1050   SODIUM mmol/L 140 137   POTASSIUM mmol/L 4.3 3.8   CHLORIDE mmol/L 108 103   CO2 mmol/L 28 29   ANION GAP mmol/L 4 5   BUN mg/dL 7 13   CREATININE mg/dL 0.84 0.92   EGFR ml/min/1.73sq m 115 108   CALCIUM mg/dL 8.8 9.7     Results from last 7 days   Lab Units 10/26/23  1050   AST U/L 20   ALT U/L 24   ALK PHOS U/L 104   TOTAL PROTEIN g/dL 8.0   ALBUMIN g/dL 4.4   TOTAL BILIRUBIN mg/dL 0.61     Results from last 7 days   Lab Units 10/27/23  0314 10/26/23  1050   GLUCOSE RANDOM mg/dL 97 138     Results from last 7 days   Lab Units 10/26/23  1050   LIPASE u/L 18     Results from last 7 days   Lab Units 10/26/23  1047   CLARITY UA  Clear   COLOR UA  Yellow   SPEC GRAV UA  >=1.030   PH UA  6.0   GLUCOSE UA mg/dl Negative   KETONES UA mg/dl Negative   BLOOD UA  Negative   PROTEIN UA mg/dl Trace*   NITRITE UA  Negative   BILIRUBIN UA  Negative   UROBILINOGEN UA (BE) mg/dl <2.0   LEUKOCYTES UA  Negative   WBC UA /hpf 1-2   RBC UA /hpf 0-1   BACTERIA UA /hpf Occasional   EPITHELIAL CELLS WET PREP /hpf Occasional                                                   ED Treatment:   Medication Administration from 10/26/2023 1013 to 10/26/2023 1651         Date/Time Order Dose Route Action Action by Comments     10/26/2023 1509 EDT sodium chloride 0.9 % bolus 1,000 mL 0 mL Intravenous Stopped Maricel Damon, RN --     10/26/2023 1050 EDT sodium chloride 0.9 % bolus 1,000 mL 1,000 mL Intravenous New Lucas Conde RN --     10/26/2023 1138 EDT iohexol (OMNIPAQUE) 350 MG/ML injection (MULTI-DOSE) 100 mL 100 mL Intravenous Given Corbin Long --     10/26/2023 1459 EDT cefTRIAXone (ROCEPHIN) IVPB (premix in dextrose) 1,000 mg 50 mL 1,000 mg Intravenous New Bag Maricel Damon, RN --     10/26/2023 3734 EDT metroNIDAZOLE (FLAGYL) tablet 500 mg 500 mg Oral Given Joao Thompson RN --          History reviewed. No pertinent past medical history. Present on Admission:  **None**      Admitting Diagnosis: Ileitis [K52.9]  Abdominal pain [R10.9]  Abscess, intestinal [K63.0]  Age/Sex: 34 y.o. male  Admission Orders: 10/26/23 1356 inpatient   Scheduled Medications:  cefTRIAXone, 1,000 mg, Intravenous, Q24H  metroNIDAZOLE, 500 mg, Oral, Q8H [facility]  nicotine, 1 patch, Transdermal, Daily      Continuous IV Infusions:  sodium chloride, 100 mL/hr, Intravenous, Continuous      PRN Meds: not used. acetaminophen, 650 mg, Oral, Q6H PRN        IP CONSULT TO ACUTE CARE SURGERY  IP CONSULT TO GASTROENTEROLOGY    Network Utilization Review Department  ATTENTION: Please call with any questions or concerns to [phone] and carefully listen to the prompts so that you are directed to the right person. All voicemails are confidential.   For Discharge needs, contact Care Management DC Support Team at [phone] opt. 2  Send all requests for admission clinical reviews, approved or denied determinations and any other requests to dedicated fax number below belonging to the campus where the patient is receiving treatment.  List of dedicated fax numbers for the Facilities:  [place] DENIALS (Administrative/Medical Necessity) [phone]   DISCHARGE SUPPORT TEAM (NETWORK) [id number] Angel Sanchez (Maternity/NICU/Pediatrics) [phone]   [address] [facility] [address] [facility] [phone]   [address] [facility] [address] [place] [address] [facility] [phone]   [address] [facility] [phone]   [address] [place] [address]  [facility] [phone]

## 2023-10-27 NOTE — PLAN OF CARE
Problem: PAIN - ADULT  Goal: Verbalizes/displays adequate comfort level or baseline comfort level  Description: Interventions:  - Encourage patient to monitor pain and request assistance  - Assess pain using appropriate pain scale  - Administer analgesics based on type and severity of pain and evaluate response  - Implement non-pharmacological measures as appropriate and evaluate response  - Consider cultural and social influences on pain and pain management  - Notify physician/advanced practitioner if interventions unsuccessful or patient reports new pain  Outcome: Progressing     Problem: INFECTION - ADULT  Goal: Absence or prevention of progression during hospitalization  Description: INTERVENTIONS:  - Assess and monitor for signs and symptoms of infection  - Monitor lab/diagnostic results  - Monitor all insertion sites, i.e. indwelling lines, tubes, and drains  - Monitor endotracheal if appropriate and nasal secretions for changes in amount and color  - [place] appropriate cooling/warming therapies per order  - Administer medications as ordered  - Instruct and encourage patient and family to use good hand hygiene technique  - Identify and instruct in appropriate isolation precautions for identified infection/condition  Outcome: Progressing     Problem: SAFETY ADULT  Goal: Patient will remain free of falls  Description: INTERVENTIONS:  - Educate patient/family on patient safety including physical limitations  - Instruct patient to call for assistance with activity   - Consult OT/PT to assist with strengthening/mobility   - Keep Call bell within reach  - Keep bed low and locked with side rails adjusted as appropriate  - Keep care items and personal belongings within reach  - Initiate and maintain comfort rounds  - Make Fall Risk Sign visible to staff  - Offer Toileting every 2 Hours, in advance of need  - Initiate/Maintain bed alarm  - Obtain necessary fall risk management equipment  - Apply yellow socks and bracelet for high fall risk patients  - Consider moving patient to room near nurses station  Outcome: Progressing  Goal: Maintain or return to baseline ADL function  Description: INTERVENTIONS:  -  Assess patient's ability to carry out ADLs; assess patient's baseline for ADL function and identify physical deficits which impact ability to perform ADLs (bathing, care of mouth/teeth, toileting, grooming, dressing, etc.)  - Assess/evaluate cause of self-care deficits   - Assess range of motion  - Assess patient's mobility; develop plan if impaired  - Assess patient's need for assistive devices and provide as appropriate  - Encourage maximum independence but intervene and supervise when necessary  - Involve family in performance of ADLs  - Assess for home care needs following discharge   - Consider OT consult to assist with ADL evaluation and planning for discharge  - Provide patient education as appropriate  Outcome: Progressing  Goal: Maintains/Returns to pre admission functional level  Description: INTERVENTIONS:  - Perform BMAT or MOVE assessment daily.   - Set and communicate daily mobility goal to care team and patient/family/caregiver. - Collaborate with rehabilitation services on mobility goals if consulted  - Perform Range of Motion 3 times a day. - Reposition patient every 2 hours.   - Dangle patient 3 times a day  - Stand patient 3 times a day  - Ambulate patient 3 times a day  - Out of bed to chair 3 times a day   - Out of bed for meals 3 times a day  - Out of bed for toileting  - Record patient progress and toleration of activity level   Outcome: Progressing     Problem: DISCHARGE PLANNING  Goal: Discharge to home or other facility with appropriate resources  Description: INTERVENTIONS:  - Identify barriers to discharge w/patient and caregiver  - Arrange for needed discharge resources and transportation as appropriate  - Identify discharge learning needs (meds, wound care, etc.)  - Arrange for interpretive services to assist at discharge as needed  - Refer to Case Management Department for coordinating discharge planning if the patient needs post-hospital services based on physician/advanced practitioner order or complex needs related to functional status, cognitive ability, or social support system  Outcome: Progressing     Problem: Knowledge Deficit  Goal: Patient/family/caregiver demonstrates understanding of disease process, treatment plan, medications, and discharge instructions  Description: Complete learning assessment and assess knowledge base.   Interventions:  - Provide teaching at level of understanding  - Provide teaching via preferred learning methods  Outcome: Progressing

## 2023-10-27 NOTE — PLAN OF CARE
Problem: PAIN - ADULT  Goal: Verbalizes/displays adequate comfort level or baseline comfort level  Description: Interventions:  - Encourage patient to monitor pain and request assistance  - Assess pain using appropriate pain scale  - Administer analgesics based on type and severity of pain and evaluate response  - Implement non-pharmacological measures as appropriate and evaluate response  - Consider cultural and social influences on pain and pain management  - Notify physician/advanced practitioner if interventions unsuccessful or patient reports new pain  Outcome: Progressing     Problem: INFECTION - ADULT  Goal: Absence or prevention of progression during hospitalization  Description: INTERVENTIONS:  - Assess and monitor for signs and symptoms of infection  - Monitor lab/diagnostic results  - Monitor all insertion sites, i.e. indwelling lines, tubes, and drains  - Monitor endotracheal if appropriate and nasal secretions for changes in amount and color  - [place] appropriate cooling/warming therapies per order  - Administer medications as ordered  - Instruct and encourage patient and family to use good hand hygiene technique  - Identify and instruct in appropriate isolation precautions for identified infection/condition  Outcome: Progressing     Problem: SAFETY ADULT  Goal: Patient will remain free of falls  Description: INTERVENTIONS:  - Educate patient/family on patient safety including physical limitations  - Instruct patient to call for assistance with activity   - Consult OT/PT to assist with strengthening/mobility   - Keep Call bell within reach  - Keep bed low and locked with side rails adjusted as appropriate  - Keep care items and personal belongings within reach  - Initiate and maintain comfort rounds  - Make Fall Risk Sign visible to staff  - Offer Toileting every 2 Hours, in advance of need  - Initiate/Maintain call bell alarm  - Obtain necessary fall risk management equipment: call bell usage  - Apply yellow socks and bracelet for high fall risk patients  - Consider moving patient to room near nurses station  Outcome: Progressing  Goal: Maintain or return to baseline ADL function  Description: INTERVENTIONS:  -  Assess patient's ability to carry out ADLs; assess patient's baseline for ADL function and identify physical deficits which impact ability to perform ADLs (bathing, care of mouth/teeth, toileting, grooming, dressing, etc.)  - Assess/evaluate cause of self-care deficits   - Assess range of motion  - Assess patient's mobility; develop plan if impaired  - Assess patient's need for assistive devices and provide as appropriate  - Encourage maximum independence but intervene and supervise when necessary  - Involve family in performance of ADLs  - Assess for home care needs following discharge   - Consider OT consult to assist with ADL evaluation and planning for discharge  - Provide patient education as appropriate  Outcome: Progressing  Goal: Maintains/Returns to pre admission functional level  Description: INTERVENTIONS:  - Perform BMAT or MOVE assessment daily.   - Set and communicate daily mobility goal to care team and patient/family/caregiver. - Collaborate with rehabilitation services on mobility goals if consulted  - Perform Range of Motion 3 times a day. - Reposition patient every 2 hours.   - Dangle patient 3 times a day  - Stand patient 3 times a day  - Ambulate patient 3 times a day  - Out of bed to chair 3 times a day   - Out of bed for meals 3 times a day  - Out of bed for toileting  - Record patient progress and toleration of activity level   Outcome: Progressing

## 2023-10-27 NOTE — DISCHARGE SUMMARY
[address] [facility]  Discharge- Destiny Upper sorbian 1990, 34 y.o. male MRN: [id number]  Unit/Bed#: -Reyna Encounter: [id number]  Primary Care Provider: No primary care provider on file. Date and time admitted to hospital: 10/26/2023 10:20 AM    * Intestinal abscess  Assessment & Plan  Presented to the emergency department with worsening lower abdominal pain. Patient reports off-and-on abdominal pain for several years. Patient reports colonoscopy 3 years ago but pt reports was inconclusive  CT abdomen/pelvis: Terminal ileitis. Present on CT from 2019. Added finding of bilobed abscess collection with fistulous tract. Suggestive of Crohn's disease. Skip areas noted as well. No lizzie perforation or obstruction. Started on IV Rocephin/Flagyl  Mild leukocytosis. Does not meet any SIRS criteria. Afebrile  Consult general surgery and gastroenterology  Per ED discussion with IR, not amenable to drain  General surgery and gastroenterology following  Patient tolerating diet without difficulty  Discussed with gastroenterology -stable for discharge home on ciprofloxacin and Flagyl for additional 10 days. Patient will follow-up with gastroenterology as outpatient    Cigarette nicotine dependence without complication  Assessment & Plan  Nicotine patch ordered      Medical Problems       Resolved Problems  Date Reviewed: 10/27/2023   None       Discharging Physician / Practitioner: Taylor Quinteros PA-C  PCP: No primary care provider on file. Admission Date:   Admission Orders (From admission, onward)       Ordered        10/26/23 [facility]                          Discharge Date: 10/27/23    Consultations During Hospital Stay:  Gastroenterology  General surgery    Procedures Performed:   None    Significant Findings / Test Results:   CT abdomen/pelvis:Terminal ileitis was mentioned on a prior outside CT from September 2019.   Diffuse ileitis is seen on this CT with the added finding of an adjacent bilobed abscess collection with fistulous tract extending caudally back to abnormal mid ileum. The appearance is suggestive of but not diagnostic of Crohn's disease. There also appear to be skip areas which would also raise concern for Crohn's disease. No lizzie perforation or obstruction. Incidental Findings:   As above  I reviewed the above mentioned incidental findings with the patient and/or family and they expressed understanding. Test Results Pending at Discharge (will require follow up): None     Outpatient Tests Requested:  None    Complications: None    Reason for Admission: Intestinal abscess    Hospital Course:   Rickie De Leon is a 34 y.o. male patient who originally presented to the hospital on 10/26/2023 due to abdominal pain. The patient, initially admitted to the hospital as inpatient, was discharged earlier than expected given the following: Rapid clinical improvement, gastroenterology clearance. Past medical history significant for tobacco abuse. Patient presented to the emergency department with worsening lower abdominal pain. CT abdomen/pelvis showed terminal ileitis as well as adjacent bilobed abscess in the mid ileum. Suggestive of Crohn's disease. Patient remained afebrile, minimal leukocytosis. Did not meet any SIRS criteria. Patient was started on IV Rocephin and Flagyl. Diet was advanced without difficulty. General surgery did not recommend any surgical intervention. Gastroenterology recommends discharge on ciprofloxacin and Flagyl for additional 10 days. Patient will follow-up with gastroenterology in the office. On day of discharge patient was afebrile, tolerating diet and verbalized understanding for requested outpatient follow-up. Please see above list of diagnoses and related plan for additional information.      Condition at Discharge: stable    Discharge Day Visit / Exam:   * Please refer to separate progress note for these details *    Discussion with Family: Patient declined call to . Discharge instructions/Information to patient and family:   See after visit summary for information provided to patient and family. Provisions for Follow-Up Care:  See after visit summary for information related to follow-up care and any pertinent home health orders. Disposition:   Home    Planned Readmission: None     Discharge Statement:  I spent 50 minutes discharging the patient. This time was spent on the day of discharge. I had direct contact with the patient on the day of discharge. Greater than 50% of the total time was spent examining patient, answering all patient questions, arranging and discussing plan of care with patient as well as directly providing post-discharge instructions. Additional time then spent on discharge activities. Discharge Medications:  See after visit summary for reconciled discharge medications provided to patient and/or family.       **Please Note: This note may have been constructed using a voice recognition system**

## 2023-10-27 NOTE — ASSESSMENT & PLAN NOTE
Presented to the emergency department with worsening lower abdominal pain. Patient reports off-and-on abdominal pain for several years. Patient reports colonoscopy 3 years ago but pt reports was inconclusive  CT abdomen/pelvis: Terminal ileitis. Present on CT from 2019. Added finding of bilobed abscess collection with fistulous tract. Suggestive of Crohn's disease. Skip areas noted as well. No lizzie perforation or obstruction. Started on IV Rocephin/Flagyl  Mild leukocytosis. Does not meet any SIRS criteria. Afebrile  Consult general surgery and gastroenterology  Per ED discussion with IR, not amenable to drain  General surgery and gastroenterology following  Patient tolerating diet without difficulty  Discussed with gastroenterology -stable for discharge home on ciprofloxacin and Flagyl for additional 10 days.   Patient will follow-up with gastroenterology as outpatient

## 2023-10-27 NOTE — PROGRESS NOTES
[address] [facility]  Progress Note  Name: Jarvis Palomino  MRN: [id number]  Unit/Bed#: -01 I Date of Admission: 10/26/2023   Date of Service: 10/27/2023 I Hospital Day: 1    Assessment/Plan   * Intestinal abscess  Assessment & Plan  Presented to the emergency department with worsening lower abdominal pain. Patient reports off-and-on abdominal pain for several years. Patient reports colonoscopy 3 years ago but pt reports was inconclusive  CT abdomen/pelvis: Terminal ileitis. Present on CT from 2019. Added finding of bilobed abscess collection with fistulous tract. Suggestive of Crohn's disease. Skip areas noted as well. No lizzie perforation or obstruction. Started on IV Rocephin/Flagyl - continue  Mild leukocytosis. Does not meet any SIRS criteria. Afebrile  Consult general surgery and gastroenterology  Per ED discussion with IR, not amenable to drain  General surgery and gastroenterology following  Diet advancement per GI    Cigarette nicotine dependence without complication  Assessment & Plan  Nicotine patch ordered         VTE Pharmacologic Prophylaxis: VTE Score: 2 Low Risk (Score 0-2) - Encourage Ambulation. Patient Centered Rounds: I performed bedside rounds with nursing staff today. Discussions with Specialists or Other Care Team Provider: CM, GI and surgery AP    Education and Discussions with Family / Patient: Patient declined call to . Total Time Spent on Date of Encounter in care of patient: 40 mins. This time was spent on one or more of the following: performing physical exam; counseling and coordination of care; obtaining or reviewing history; documenting in the medical record; reviewing/ordering tests, medications or procedures; communicating with other healthcare professionals and discussing with patient's family/caregivers.     Current Length of Stay: 1 day(s)  Current Patient Status: Inpatient   Certification Statement: The patient will continue to require additional inpatient hospital stay due to diet advancement, IV antibiotics  Discharge Plan: Anticipate discharge in 24-48 hrs to home. Code Status: Level 1 - Full Code    Subjective:   Patient continues to feel well. Denies chest pain/palpitations, shortness of breath, nausea/vomiting or abdominal pain. Tolerating clears. No bowel movement as of yet. Objective:     Vitals:   Temp (24hrs), Av.4 °F (36.3 °C), Min:97.3 °F (36.3 °C), Max:97.5 °F (36.4 °C)    Temp:  [97.3 °F (36.3 °C)-97.5 °F (36.4 °C)] 97.3 °F (36.3 °C)  HR:  [57-63] 57  Resp:  [16] 16  BP: (113-127)/(78-81) 127/81  SpO2:  [100 %] 100 %  Body mass index is 20.53 kg/m². Input and Output Summary (last 24 hours): Intake/Output Summary (Last 24 hours) at 10/27/2023 1219  Last data filed at 10/27/2023 0326  Gross per 24 hour   Intake 1440 ml   Output --   Net 1440 ml       Physical Exam:   Physical Exam  Vitals and nursing note reviewed. Constitutional:       General: He is not in acute distress. Appearance: He is well-developed. Comments: No acute distress   HENT:      Head: Normocephalic and atraumatic. Eyes:      General: No scleral icterus. Extraocular Movements: Extraocular movements intact. Conjunctiva/sclera: Conjunctivae normal.   Cardiovascular:      Rate and Rhythm: Normal rate and regular rhythm. Heart sounds: No murmur heard. Pulmonary:      Effort: Pulmonary effort is normal. No respiratory distress. Breath sounds: Normal breath sounds. No wheezing, rhonchi or rales. Abdominal:      General: Bowel sounds are normal.      Palpations: Abdomen is soft. Tenderness: There is no abdominal tenderness. There is no guarding or rebound. Musculoskeletal:         General: No swelling. Cervical back: Normal range of motion. Comments: Ambulating room without difficulty, no edema   Skin:     General: Skin is warm and dry.       Capillary Refill: Capillary refill takes less than 2 seconds. Neurological:      Mental Status: He is alert and oriented to person, place, and time. Psychiatric:         Mood and Affect: Mood normal.         Speech: Speech normal.         Behavior: Behavior normal.          Additional Data:     Labs:  Results from last 7 days   Lab Units 10/27/23  0314   WBC Thousand/uL 10.80*   HEMOGLOBIN g/dL 12.3   HEMATOCRIT % 38.8   PLATELETS Thousands/uL 334   NEUTROS PCT % 59   LYMPHS PCT % 23   MONOS PCT % 11   EOS PCT % 6     Results from last 7 days   Lab Units 10/27/23  0314 10/26/23  1050   SODIUM mmol/L 140 137   POTASSIUM mmol/L 4.3 3.8   CHLORIDE mmol/L 108 103   CO2 mmol/L 28 29   BUN mg/dL 7 13   CREATININE mg/dL 0.84 0.92   ANION GAP mmol/L 4 5   CALCIUM mg/dL 8.8 9.7   ALBUMIN g/dL  --  4.4   TOTAL BILIRUBIN mg/dL  --  0.61   ALK PHOS U/L  --  104   ALT U/L  --  24   AST U/L  --  20   GLUCOSE RANDOM mg/dL 97 138                       Lines/Drains:  Invasive Devices       Peripheral Intravenous Line  Duration             Peripheral IV 10/26/23 Left Antecubital 1 day                          Imaging: No pertinent imaging reviewed. Recent Cultures (last 7 days):         Last 24 Hours Medication List:   Current Facility-Administered Medications   Medication Dose Route Frequency Provider Last Rate    acetaminophen  650 mg Oral Q6H PRN Ira Irving PA-C      cefTRIAXone  1,000 mg Intravenous Q24H [facility] ADIEL Alonso      metroNIDAZOLE  500 mg Oral [facility] ADIEL Alonso      nicotine  1 patch Transdermal Daily [facility] ADIEL Alonso      sodium chloride  100 mL/hr Intravenous Continuous Ira Irving PA-C 100 mL/hr (10/27/23 0322)        Today, Patient Was Seen By: Ira Irving PA-C    **Please Note: This note may have been constructed using a voice recognition system. **

## 2023-10-27 NOTE — PROGRESS NOTES
Progress Note - Henri Toth 34 y.o. male MRN: [id number]    Unit/Bed#: -01 Encounter: [id number]      Assessment/Plan:-    Terminal ileitis likely crohn's with abscess  -diet as per GI  -Needs op GI follow up  -recommend tx with abx  -follow up repeat CT abdomen pelvis in one month and follow up with gen surgery in one month          Subjective:   Doing well  Tolerating diet  Wants to go home    Objective:     Vitals: Blood pressure 127/81, pulse 57, temperature (!) 97.3 °F (36.3 °C), resp. rate 16, height 5' 8" (1.727 m), weight 61.2 kg (135 lb), SpO2 100 %. ,Body mass index is 20.53 kg/m². Intake/Output Summary (Last 24 hours) at 10/27/2023 1354  Last data filed at 10/27/2023 1235  Gross per 24 hour   Intake 2400 ml   Output --   Net 2400 ml       Physical Exam:    Constituional: obese, pleasant female laying in bed, NAD  HEENT: AT, NC, PERRL, EOMI, scleral icterus, mask on  CV: regular rate, regular rhythm, no murmurs  Pulm: CTAB, effort normal, no crackles or wheezes  Abdomen: Soft, distended, protuberant, no ttp, No guarding. Neg gilbert's. + BS  MSK: No joint swelling, Normal ROM in all joints  Extremities: No edema, socks in place. Skin: warm and dry.  No rashes or discoloration  Neurologic: face symmetric, speech nl , marlow, no gross deficits noted  Pycsh: normal mood and affect      Invasive Devices       None

## 2023-10-30 ENCOUNTER — TELEPHONE (OUTPATIENT)
Age: 33
End: 2023-10-30

## 2023-10-30 NOTE — UTILIZATION REVIEW
NOTIFICATION OF ADMISSION DISCHARGE   This is a Notification of Discharge from [address] Please be advised that this patient has been discharge from our facility. Below you will find the admission and discharge date and time including the patient’s disposition. UTILIZATION REVIEW CONTACT:  Twyla Stewart  Utilization   Network Utilization Review Department  Phone: [phone] carefully listen to the prompts. All voicemails are confidential.  Email: [email or web address]     ADMISSION INFORMATION  PRESENTATION DATE: 10/26/2023 10:20 AM  OBERVATION ADMISSION DATE:   INPATIENT ADMISSION DATE: 10/26/23  1:56 PM   DISCHARGE DATE: 10/27/2023  1:56 PM   DISPOSITION:Home/Self Care    Network Utilization Review Department  ATTENTION: Please call with any questions or concerns to [phone] and carefully listen to the prompts so that you are directed to the right person. All voicemails are confidential.   For Discharge needs, contact Care Management DC Support Team at [phone] opt. 2  Send all requests for admission clinical reviews, approved or denied determinations and any other requests to dedicated fax number below belonging to the campus where the patient is receiving treatment.  List of dedicated fax numbers for the Facilities:  [place] DENIALS (Administrative/Medical Necessity) [phone]   DISCHARGE SUPPORT TEAM (Network) [phone] [facility] ([facility]ediatrics) [phone]   [address] [facility] [address] [phone] [address] [facility] [phone]   [address] [place] [address]  [address]  [phone]

## 2023-10-30 NOTE — TELEPHONE ENCOUNTER
Patients GI provider:  new pt, no doc assigned    Number to return call: [phone]    Reason for call: Bernabe Kelly from [address] [place] [address], calling to request records of pt's visits. Advised pt was seen in ED. Bernabe Kelly requested ED notes and CT results be faxed over to [phone]. Please reach out to Bernabe Kelly at number above if any additional info is needed.     Scheduled procedure/appointment date if applicable: N/A

## 2023-11-01 ENCOUNTER — PREP FOR PROCEDURE (OUTPATIENT)
Dept: GASTROENTEROLOGY | Facility: CLINIC | Age: 33
End: 2023-11-01

## 2023-11-01 ENCOUNTER — OFFICE VISIT (OUTPATIENT)
Dept: GASTROENTEROLOGY | Facility: CLINIC | Age: 33
End: 2023-11-01
Payer: COMMERCIAL

## 2023-11-01 ENCOUNTER — TELEPHONE (OUTPATIENT)
Dept: GASTROENTEROLOGY | Facility: CLINIC | Age: 33
End: 2023-11-01

## 2023-11-01 VITALS
WEIGHT: 139 LBS | DIASTOLIC BLOOD PRESSURE: 80 MMHG | SYSTOLIC BLOOD PRESSURE: 120 MMHG | HEIGHT: 68 IN | BODY MASS INDEX: 21.07 KG/M2

## 2023-11-01 DIAGNOSIS — K50.814 CROHN'S DISEASE OF BOTH SMALL AND LARGE INTESTINE WITH ABSCESS (HCC): Primary | ICD-10-CM

## 2023-11-01 PROCEDURE — 99214 OFFICE O/P EST MOD 30 MIN: CPT | Performed by: INTERNAL MEDICINE

## 2023-11-01 NOTE — TELEPHONE ENCOUNTER
Scheduled date of colonoscopy (as of today):1-11-24  Physician performing colonoscopy:Chloe  Location of colonoscopy:[facility]  Bowel prep reviewed with patient:Miralax  Instructions reviewed with patient by:CORKY  Clearances: no   wife   juan manuel  [phone]

## 2023-11-01 NOTE — PATIENT INSTRUCTIONS
Complete course of antibiotics. If gets worse let me know. MRI of the small bowel in the next couple weeks. Colonoscopy in 4 weeks. Blood work in the next couple weeks.   Office visit in 2 to 3 months but if diagnosis of Crohn's disease confirmed with MRI and colonoscopy will initiate treatment prior to that

## 2023-11-01 NOTE — PROGRESS NOTES
[facility] - Outpatient Follow-up Note  Apurva Crawley 34 y.o. male MRN: [id number]  Encounter: [id number]    ASSESSMENT AND PLAN:      1. Crohn's disease of both small and large intestine with abscess ([address])  Previously evaluated in 2019 for abdominal pain and change in bowel habits. CAT scan with ileitis. Colonoscopy with ileitis. Suspect Crohn's disease was a diagnosis then but he never followed up. Now admitted with right lower quadrant abscess with fistulous tract to the ileum. - Complete course of Cipro and Flagyl  - Hepatitis B surface antigen; Future  - Hepatitis B surface antibody; Future  - Quantiferon TB Gold Plus Assay; Future  - MRI enterography w wo; Future  - Colonoscopy at [facility]) in 4 weeks  - Once diagnosis of Crohn's disease is made he will need treatment with biologic agent      Followup Appointment: 3 months  ______________________________________________________________________    Chief Complaint   Patient presents with    Follow-up     Was in hospital last Thur-Fri for intestinal abcess     HPI: The patient is seen in the office in follow-up after recent hospitalization. He was worked up by Krista Murillo in 2019 secondary to abdominal pain and change in bowel habits. A CAT scan showed terminal ileitis. A colonoscopy showed a boggy colon and there was ulcerative ileitis on intubation of the terminal ileum. An MRE was ordered but never got done. Patient never followed up. Patient admitted to [address] [place] [address] last week with abdominal pain. CAT scan in the emergency room revealed right lower quadrant abscess with a fistulous tract to the ileum. This was not amenable to IR drainage. He was treated with IV antibiotics and his pain seemed to improve. He was discharged and told to follow-up with GI. He reports the pain is tolerable. He denies any fevers although reports some sweats last night. He denies any diarrhea. He denies any GI bleeding.   He denies any upper GI symptoms. His weight is relatively stable. Historical Information   History reviewed. No pertinent past medical history. History reviewed. No pertinent surgical history. Social History     Substance and Sexual Activity   Alcohol Use Never     Social History     Substance and Sexual Activity   Drug Use Yes    Types: Marijuana     Social History     Tobacco Use   Smoking Status Every Day    Packs/day: 0.50    Types: Cigarettes   Smokeless Tobacco Never     Family History   Problem Relation Age of Onset    Colon cancer Neg Hx     Colon polyps Neg Hx          Current Outpatient Medications:     ciprofloxacin (CIPRO) 500 mg tablet    metroNIDAZOLE (FLAGYL) 500 mg tablet  Allergies   Allergen Reactions    Ammonia Nasal Congestion     Reviewed medications and allergies and updated as indicated    PHYSICAL EXAM:    Blood pressure 120/80, height 5' 8" (1.727 m), weight 63 kg (139 lb). Body mass index is 21.13 kg/m². General Appearance: NAD, cooperative, alert  Eyes: Anicteric, PERRLA, EOMI  ENT:  Normocephalic, atraumatic, normal mucosa. Neck:  Supple, symmetrical, trachea midline  Resp:  Clear to auscultation bilaterally; no rales, rhonchi or wheezing; respirations unlabored   CV:  S1 S2, Regular rate and rhythm; no murmur, rub, or gallop. GI:  Soft, non-tender, non-distended; normal bowel sounds; no masses, no organomegaly   Rectal: Deferred but denies any fistulous disease  Musculoskeletal: No cyanosis, clubbing or edema. Normal ROM.   Skin:  No jaundice, rashes, or lesions   Heme/Lymph: No palpable cervical lymphadenopathy  Psych: Normal affect, good eye contact  Neuro: No gross deficits, AAOx3    Lab Results:   Lab Results   Component Value Date    WBC 10.80 (H) 10/27/2023    HGB 12.3 10/27/2023    HCT 38.8 10/27/2023    MCV 88 10/27/2023     10/27/2023     Lab Results   Component Value Date    K 4.3 10/27/2023     10/27/2023    CO2 28 10/27/2023    BUN 7 10/27/2023 CREATININE 0.84 10/27/2023    CALCIUM 8.8 10/27/2023    AST 20 10/26/2023    ALT 24 10/26/2023    ALKPHOS 104 10/26/2023    EGFR 115 10/27/2023       Lab Results   Component Value Date    LIPASE 18 10/26/2023       Radiology Results:     CT abdomen pelvis with contrast  Result Date: 10/26/2023    Impression: Exam limited without oral contrast. Terminal ileitis was mentioned on a prior outside CT from September 2019. Diffuse ileitis is seen on this CT with the added finding of an adjacent bilobed abscess collection with fistulous tract extending caudally back to abnormal mid ileum. The appearance is suggestive of but not diagnostic of Crohn's disease. There also appear to be skip areas which would also raise concern for Crohn's disease. No lizzie perforation or obstruction.

## 2023-11-08 ENCOUNTER — NURSE TRIAGE (OUTPATIENT)
Age: 33
End: 2023-11-08

## 2023-11-08 NOTE — TELEPHONE ENCOUNTER
On call provider contacted on your behalf, per on call provider patient should be seen in the emergency department. Patient complains of right lower side pain around hip area that radiates down into testicles and up into back. Pain started two days ago. Relieved by urination. Denies fevers but admits to chills and sweating with pain. Pain is at it's worst it's a 10/10. Once he empties bladder goes down to a 3/10. Denies pain with urination at this time. Currently on flagyl and just finished 10 days of cipro. Reason for Disposition   SEVERE abdominal pain (e.g., excruciating)    Answer Assessment - Initial Assessment Questions  1. LOCATION: "Where does it hurt?"       Right lower quad   2. RADIATION: "Does the pain shoot anywhere else?" (e.g., chest, back)      Down into groin and up into back   3. ONSET: "When did the pain begin?" (Minutes, hours or days ago)       2 days ago   4. SUDDEN: "Gradual or sudden onset?"      Sudden   5. PATTERN "Does the pain come and go, or is it constant?"  Comes and goes   6. SEVERITY: "How bad is the pain?"  (e.g., Scale 1-10; mild, moderate, or severe)  3/10 and at it's worst it's a 10/10   7. RECURRENT SYMPTOM: "Have you ever had this type of stomach pain before?" If Yes, ask: "When was the last time?" and "What happened that time?"       demies  8. CAUSE: "What do you think is causing the stomach pain?"      Denies   9. RELIEVING/AGGRAVATING FACTORS: "What makes it better or worse?" (e.g., movement, antacids, bowel movement)     Relieved by urination   10. OTHER SYMPTOMS: "Has there been any vomiting, diarrhea, constipation, or urine problems?"       Denies any urinary complaints.     Protocols used: Abdominal Pain - Male-ADULT-[place]

## 2023-11-20 ENCOUNTER — HOSPITAL ENCOUNTER (INPATIENT)
Facility: HOSPITAL | Age: 33
LOS: 4 days | Discharge: HOME | DRG: 386 | End: 2023-11-24
Attending: EMERGENCY MEDICINE | Admitting: INTERNAL MEDICINE
Payer: COMMERCIAL

## 2023-11-20 ENCOUNTER — APPOINTMENT (EMERGENCY)
Dept: CT IMAGING | Facility: HOSPITAL | Age: 33
DRG: 386 | End: 2023-11-20
Payer: COMMERCIAL

## 2023-11-20 DIAGNOSIS — K50.114: Primary | ICD-10-CM

## 2023-11-20 DIAGNOSIS — R91.1 PULMONARY NODULE: ICD-10-CM

## 2023-11-20 DIAGNOSIS — A49.8 CLOSTRIDIOIDES DIFFICILE INFECTION: ICD-10-CM

## 2023-11-20 DIAGNOSIS — K63.0 INTESTINAL ABSCESS: ICD-10-CM

## 2023-11-20 DIAGNOSIS — R10.30 LOWER ABDOMINAL PAIN: ICD-10-CM

## 2023-11-20 LAB
ALBUMIN SERPL BCP-MCNC: 3.8 G/DL (ref 3.5–5)
ALP SERPL-CCNC: 57 U/L (ref 34–104)
ALT SERPL W P-5'-P-CCNC: 10 U/L (ref 7–52)
ANION GAP SERPL CALCULATED.3IONS-SCNC: 8 MMOL/L
AST SERPL W P-5'-P-CCNC: 12 U/L (ref 13–39)
BASOPHILS # BLD MANUAL: 0 THOUSAND/UL (ref 0–0.1)
BASOPHILS NFR MAR MANUAL: 0 % (ref 0–1)
BILIRUB SERPL-MCNC: 0.46 MG/DL (ref 0.2–1)
BILIRUB UR QL STRIP: NEGATIVE
BUN SERPL-MCNC: 14 MG/DL (ref 5–25)
CALCIUM SERPL-MCNC: 9.4 MG/DL (ref 8.4–10.2)
CHLORIDE SERPL-SCNC: 96 MMOL/L (ref 96–108)
CLARITY UR: CLEAR
CO2 SERPL-SCNC: 29 MMOL/L (ref 21–32)
COLOR UR: ABNORMAL
CREAT SERPL-MCNC: 0.89 MG/DL (ref 0.6–1.3)
EOSINOPHIL # BLD MANUAL: 0.49 THOUSAND/UL (ref 0–0.4)
EOSINOPHIL NFR BLD MANUAL: 5 % (ref 0–6)
ERYTHROCYTE [DISTWIDTH] IN BLOOD BY AUTOMATED COUNT: 12.3 % (ref 11.6–15.1)
GFR SERPL CREATININE-BSD FRML MDRD: 111 ML/MIN/1.73SQ M
GLUCOSE SERPL-MCNC: 128 MG/DL (ref 65–140)
GLUCOSE UR STRIP-MCNC: NEGATIVE MG/DL
HCT VFR BLD AUTO: 38.8 % (ref 36.5–49.3)
HGB BLD-MCNC: 12.3 G/DL (ref 12–17)
HGB UR QL STRIP.AUTO: NEGATIVE
KETONES UR STRIP-MCNC: NEGATIVE MG/DL
LACTATE SERPL-SCNC: 0.7 MMOL/L (ref 0.5–2)
LEUKOCYTE ESTERASE UR QL STRIP: NEGATIVE
LG PLATELETS BLD QL SMEAR: PRESENT
LIPASE SERPL-CCNC: 17 U/L (ref 11–82)
LYMPHOCYTES # BLD AUTO: 1.57 THOUSAND/UL (ref 0.6–4.47)
LYMPHOCYTES # BLD AUTO: 13 % (ref 14–44)
MCH RBC QN AUTO: 27 PG (ref 26.8–34.3)
MCHC RBC AUTO-ENTMCNC: 31.7 G/DL (ref 31.4–37.4)
MCV RBC AUTO: 85 FL (ref 82–98)
MICROCYTES BLD QL AUTO: PRESENT
MONOCYTES # BLD AUTO: 0.29 THOUSAND/UL (ref 0–1.22)
MONOCYTES NFR BLD: 3 % (ref 4–12)
NEUTROPHILS # BLD MANUAL: 7.44 THOUSAND/UL (ref 1.85–7.62)
NEUTS SEG NFR BLD AUTO: 76 % (ref 43–75)
NITRITE UR QL STRIP: NEGATIVE
PH UR STRIP.AUTO: 6 [PH]
PLATELET # BLD AUTO: 434 THOUSANDS/UL (ref 149–390)
PLATELET BLD QL SMEAR: ADEQUATE
PMV BLD AUTO: 8.9 FL (ref 8.9–12.7)
POTASSIUM SERPL-SCNC: 3.9 MMOL/L (ref 3.5–5.3)
PROT SERPL-MCNC: 7.8 G/DL (ref 6.4–8.4)
PROT UR STRIP-MCNC: NEGATIVE MG/DL
RBC # BLD AUTO: 4.55 MILLION/UL (ref 3.88–5.62)
RBC MORPH BLD: PRESENT
SODIUM SERPL-SCNC: 133 MMOL/L (ref 135–147)
SP GR UR STRIP.AUTO: <1.005 (ref 1–1.03)
UROBILINOGEN UR STRIP-ACNC: <2 MG/DL
VARIANT LYMPHS # BLD AUTO: 3 %
WBC # BLD AUTO: 9.79 THOUSAND/UL (ref 4.31–10.16)

## 2023-11-20 PROCEDURE — 81003 URINALYSIS AUTO W/O SCOPE: CPT | Performed by: EMERGENCY MEDICINE

## 2023-11-20 PROCEDURE — 85027 COMPLETE CBC AUTOMATED: CPT | Performed by: EMERGENCY MEDICINE

## 2023-11-20 PROCEDURE — 85007 BL SMEAR W/DIFF WBC COUNT: CPT | Performed by: EMERGENCY MEDICINE

## 2023-11-20 PROCEDURE — 99222 1ST HOSP IP/OBS MODERATE 55: CPT | Performed by: PHYSICIAN ASSISTANT

## 2023-11-20 PROCEDURE — 99254 IP/OBS CNSLTJ NEW/EST MOD 60: CPT | Performed by: SURGERY

## 2023-11-20 PROCEDURE — 99284 EMERGENCY DEPT VISIT MOD MDM: CPT

## 2023-11-20 PROCEDURE — 83605 ASSAY OF LACTIC ACID: CPT | Performed by: EMERGENCY MEDICINE

## 2023-11-20 PROCEDURE — 87040 BLOOD CULTURE FOR BACTERIA: CPT | Performed by: EMERGENCY MEDICINE

## 2023-11-20 PROCEDURE — 83690 ASSAY OF LIPASE: CPT | Performed by: EMERGENCY MEDICINE

## 2023-11-20 PROCEDURE — 96360 HYDRATION IV INFUSION INIT: CPT

## 2023-11-20 PROCEDURE — 80053 COMPREHEN METABOLIC PANEL: CPT | Performed by: EMERGENCY MEDICINE

## 2023-11-20 PROCEDURE — 99285 EMERGENCY DEPT VISIT HI MDM: CPT | Performed by: EMERGENCY MEDICINE

## 2023-11-20 PROCEDURE — 96361 HYDRATE IV INFUSION ADD-ON: CPT

## 2023-11-20 PROCEDURE — 74177 CT ABD & PELVIS W/CONTRAST: CPT

## 2023-11-20 PROCEDURE — 36415 COLL VENOUS BLD VENIPUNCTURE: CPT | Performed by: EMERGENCY MEDICINE

## 2023-11-20 PROCEDURE — G1004 CDSM NDSC: HCPCS

## 2023-11-20 RX ORDER — ACETAMINOPHEN 325 MG/1
975 TABLET ORAL EVERY 8 HOURS PRN
Status: DISCONTINUED | OUTPATIENT
Start: 2023-11-20 | End: 2023-11-24 | Stop reason: HOSPADM

## 2023-11-20 RX ORDER — KETOROLAC TROMETHAMINE 30 MG/ML
15 INJECTION, SOLUTION INTRAMUSCULAR; INTRAVENOUS EVERY 6 HOURS PRN
Status: DISPENSED | OUTPATIENT
Start: 2023-11-20 | End: 2023-11-22

## 2023-11-20 RX ORDER — NICOTINE 21 MG/24HR
14 PATCH, TRANSDERMAL 24 HOURS TRANSDERMAL DAILY
Status: DISCONTINUED | OUTPATIENT
Start: 2023-11-20 | End: 2023-11-24 | Stop reason: HOSPADM

## 2023-11-20 RX ADMIN — PIPERACILLIN AND TAZOBACTAM 3.38 G: 3; .375 INJECTION, POWDER, LYOPHILIZED, FOR SOLUTION INTRAVENOUS at 18:06

## 2023-11-20 RX ADMIN — IOHEXOL 100 ML: 350 INJECTION, SOLUTION INTRAVENOUS at 16:25

## 2023-11-20 RX ADMIN — SODIUM CHLORIDE 500 ML: 9 INJECTION, SOLUTION INTRAVENOUS at 16:07

## 2023-11-20 RX ADMIN — KETOROLAC TROMETHAMINE 15 MG: 30 INJECTION, SOLUTION INTRAMUSCULAR; INTRAVENOUS at 19:05

## 2023-11-20 RX ADMIN — NICOTINE 14 MG: 14 PATCH, EXTENDED RELEASE TRANSDERMAL at 19:05

## 2023-11-20 NOTE — ASSESSMENT & PLAN NOTE
Presents for worsening abdominal pain x 2 days, reports mild abdominal pain since discharge on 10/27/23. Repeat CT today "Interval enlargement of the mesenteric multiloculated rim-enhancing collection between the inflamed sigmoid colon and distal ileum. It is presumed to be abscess, but fistulous communication is difficult to completely exclude. No gas within the collection.  Concern for stricture of the affected segment of terminal and distal ileum, unchanged."  Start Zosyn  Consult Surgery for possible I&D  Consult GI  NPO at midnight

## 2023-11-20 NOTE — ASSESSMENT & PLAN NOTE
CT imaging "Indeterminate solid pulmonary nodule measuring 6 mm. "  Reports tobacco use x 13 years  Recommend repeat CT in 3-6 months to monitor lung nodule

## 2023-11-20 NOTE — DISCHARGE INSTRUCTIONS
IMPRESSION:  Interval enlargement of the mesenteric multiloculated rim-enhancing collection between the inflamed sigmoid colon and distal ileum. It is presumed to be abscess, but fistulous communication is difficult to completely exclude. No gas within the   collection. Concern for stricture of the affected segment of terminal and distal ileum, unchanged. No new abnormalities.

## 2023-11-20 NOTE — ED PROVIDER NOTES
History  Chief Complaint   Patient presents with    Abdominal Pain     Patient is a 67-year-old male who presents with abdominal pain. Patient states that since his discharge from the hospital he has had slight abdominal pain at baseline, but in the last 2 days his abdominal pain has significantly worsened. He describes the abdominal pain as in the lower abdomen, constant, moderate in intensity, progressively worsening, spasming and sharp in nature occasionally. States that he occasionally notices a bit of blood in his stools, but not lizzie blood and only a very small amount. Dates that he is taken a marijuana gummy which helps with his pain yesterday. None       Past Medical History:   Diagnosis Date    Crohn's colitis ([address])        No past surgical history on file. Family History   Problem Relation Age of Onset    Colon cancer Neg Hx     Colon polyps Neg Hx      I have reviewed and agree with the history as documented. E-Cigarette/Vaping    E-Cigarette Use Never User      E-Cigarette/Vaping Substances     Social History     Tobacco Use    Smoking status: Every Day     Packs/day: 0.50     Types: Cigarettes    Smokeless tobacco: Never   Vaping Use    Vaping Use: Never used   Substance Use Topics    Alcohol use: Never    Drug use: Yes     Types: Marijuana       Review of Systems   Constitutional:  Negative for activity change, appetite change, chills and fever. Cardiovascular:  Negative for chest pain. Gastrointestinal:  Positive for abdominal pain and blood in stool. Negative for abdominal distention, constipation, diarrhea, nausea and vomiting. Genitourinary:  Negative for dysuria, frequency and hematuria. Physical Exam  Physical Exam  Vitals and nursing note reviewed. Constitutional:       General: He is not in acute distress. Appearance: Normal appearance. He is not ill-appearing, toxic-appearing or diaphoretic. HENT:      Head: Normocephalic and atraumatic. Mouth/Throat:      Mouth: Mucous membranes are moist.   Eyes:      Conjunctiva/sclera: Conjunctivae normal.      Pupils: Pupils are equal, round, and reactive to light. Cardiovascular:      Rate and Rhythm: Normal rate and regular rhythm. Pulses: Normal pulses. Heart sounds: Normal heart sounds. No murmur heard. Pulmonary:      Effort: Pulmonary effort is normal. No respiratory distress. Breath sounds: Normal breath sounds. No stridor. No wheezing, rhonchi or rales. Chest:      Chest wall: No tenderness. Abdominal:      General: Bowel sounds are normal. There is no distension. Palpations: Abdomen is soft. Tenderness: There is abdominal tenderness in the right lower quadrant, suprapubic area and left lower quadrant. There is no guarding or rebound. Negative signs include Solis's sign, Rovsing's sign, McBurney's sign and psoas sign. Skin:     General: Skin is warm and dry. Neurological:      General: No focal deficit present. Mental Status: He is alert and oriented to person, place, and time. Mental status is at baseline.          Vital Signs  ED Triage Vitals [11/20/23 1518]   Temperature Pulse Respirations Blood Pressure SpO2   97.7 °F (36.5 °C) 86 18 141/72 99 %      Temp Source Heart Rate Source Patient Position - Orthostatic VS BP Location FiO2 (%)   Temporal Monitor Lying Right arm --      Pain Score       5           Vitals:    11/20/23 1518   BP: 141/72   Pulse: 86   Patient Position - Orthostatic VS: Lying         Visual Acuity      ED Medications  Medications   sodium chloride 0.9 % bolus 500 mL (has no administration in time range)   piperacillin-tazobactam (ZOSYN) IVPB 3.375 g (has no administration in time range)   iohexol (OMNIPAQUE) 350 MG/ML injection (MULTI-DOSE) 100 mL (100 mL Intravenous Given 11/20/23 1625)       Diagnostic Studies  Results Reviewed       Procedure Component Value Units Date/Time    Blood culture #1 [[id number]]     Lab Status: No result Specimen: Blood     Blood culture #2 [[id number]]     Lab Status: No result Specimen: Blood     RBC Morphology Reflex Test [[id number]] Collected: 11/20/23 1532    Lab Status: Final result Specimen: Blood from Arm, Right Updated: 11/20/23 1701    CBC and differential [[id number]]  (Abnormal) Collected: 11/20/23 1532    Lab Status: Final result Specimen: Blood from Arm, Right Updated: 11/20/23 1610     WBC 9.79 Thousand/uL      RBC 4.55 Million/uL      Hemoglobin 12.3 g/dL      Hematocrit 38.8 %      MCV 85 fL      MCH 27.0 pg      MCHC 31.7 g/dL      RDW 12.3 %      MPV 8.9 fL      Platelets 824 Thousands/uL     Narrative: This is an appended report. These results have been appended to a previously verified report.     Manual Differential(PHLEBS Do Not Order) [[id number]]  (Abnormal) Collected: 11/20/23 1532    Lab Status: Final result Specimen: Blood from Arm, Right Updated: 11/20/23 1610     Segmented % 76 %      Lymphocytes % 13 %      Monocytes % 3 %      Eosinophils, % 5 %      Basophils % 0 %      Atypical Lymphocytes % 3 %      Absolute Neutrophils 7.44 Thousand/uL      Lymphocytes Absolute 1.57 Thousand/uL      Monocytes Absolute 0.29 Thousand/uL      Eosinophils Absolute 0.49 Thousand/uL      Basophils Absolute 0.00 Thousand/uL      Total Counted --     RBC Morphology Present     Platelet Estimate Adequate     Large Platelet Present     Microcytes Present    Comprehensive metabolic panel [[id number]]  (Abnormal) Collected: 11/20/23 1532    Lab Status: Final result Specimen: Blood from Arm, Right Updated: 11/20/23 1604     Sodium 133 mmol/L      Potassium 3.9 mmol/L      Chloride 96 mmol/L      CO2 29 mmol/L      ANION GAP 8 mmol/L      BUN 14 mg/dL      Creatinine 0.89 mg/dL      Glucose 128 mg/dL      Calcium 9.4 mg/dL      AST 12 U/L      ALT 10 U/L      Alkaline Phosphatase 57 U/L      Total Protein 7.8 g/dL      Albumin 3.8 g/dL      Total Bilirubin 0.46 mg/dL      eGFR 111 ml/min/1.73sq m Narrative:      National Kid[employer] Disease Foundation guidelines for Chronic Kidney Disease (CKD):     Stage 1 with normal or high GFR (GFR > 90 mL/min/1.73 square meters)    Stage 2 Mild CKD (GFR = 60-89 mL/min/1.73 square meters)    Stage 3A Moderate CKD (GFR = 45-59 mL/min/1.73 square meters)    Stage 3B Moderate CKD (GFR = 30-44 mL/min/1.73 square meters)    Stage 4 Severe CKD (GFR = 15-29 mL/min/1.73 square meters)    Stage 5 End Stage CKD (GFR <15 mL/min/1.73 square meters)  Note: GFR calculation is accurate only with a steady state creatinine    Lipase [[id number]]  (Normal) Collected: 11/20/23 1532    Lab Status: Final result Specimen: Blood from Arm, Right Updated: 11/20/23 1604     Lipase 17 u/L     Lactic acid, plasma (w/reflex if result > 2.0) [[id number]]  (Normal) Collected: 11/20/23 1532    Lab Status: Final result Specimen: Blood from Arm, Right Updated: 11/20/23 1601     LACTIC ACID 0.7 mmol/L     Narrative:      Result may be elevated if tourniquet was used during collection. UA w Reflex to Microscopic w Reflex to Culture [[id number]]  (Abnormal) Collected: 11/20/23 1554    Lab Status: Final result Specimen: Urine, Clean Catch Updated: 11/20/23 1600     Color, UA Light Yellow     Clarity, UA Clear     Specific Gravity, UA <1.005     pH, UA 6.0     Leukocytes, UA Negative     Nitrite, UA Negative     Protein, UA Negative mg/dl      Glucose, UA Negative mg/dl      Ketones, UA Negative mg/dl      Urobilinogen, UA <2.0 mg/dl      Bilirubin, UA Negative     Occult Blood, UA Negative                   CT abdomen pelvis with contrast   Final Result by Zabrina Holder MD (11/20 1714)   Interval enlargement of the mesenteric multiloculated rim-enhancing collection between the inflamed sigmoid colon and distal ileum. It is presumed to be abscess, but fistulous communication is difficult to completely exclude. No gas within the    collection.    Concern for stricture of the affected segment of terminal and distal ileum, unchanged. No new abnormalities. Indeterminate solid pulmonary nodule measuring 6 mm. In a low-risk patient with a solid nodule of 6-8 mm, recommend CT at 6-12 months, then consider CT at 18-24 months. Workstation performed: [id number]                    Procedures  Procedures         ED Course  ED Course as of 11/20/23 1750   Mon Nov 20, 2023   1715 TT GI, Dr. Veena Day for recommendations on plan of care. 1726 Dr Veena Day recommends admission with evaluation for drainage/ surgical intervention and to start zosyn    200 TT Tacho Bui, general surgery PA for evaluation as per GI recommendations. [facility] Surgery team will evaluate. Medical Decision Making  Assessment and plan:  Differential includes Crohn's flare versus repeat abscess collection or fistula formation. Check labs to assess for electrolyte abnormalities, leukocytosis, anemia, kidney and liver function; lipase to assess for pancreatitis; CT imaging to evaluate for aforementioned differential.    Review of medical records specifically from gastroenterology note from 11/1 and discharge summary from 10/27 show that the patient had ileitis in 2019 with suspicion of Crohn's disease for which she did not follow-up and then returned in October 2023 and was found to have "terminal ileitis present from 2019 with additional finding of bilobed abscess collection with fistulous track suggestive of Crohn's disease with skip areas noted as well. There was no lizzie perforation or obstruction", he did initially with IV antibiotics and then discharged with p.o. Cipro and Flagyl. Amount and/or Complexity of Data Reviewed  Labs: ordered. Radiology: ordered. Risk  Prescription drug management. Decision regarding hospitalization.              Disposition  Final diagnoses:   Pulmonary nodule   Lower abdominal pain   Crohn's disease, colon, with abscess ([address])     Time reflects when diagnosis was documented in both MDM as applicable and the Disposition within this note       Time User Action Codes Description Comment    11/20/2023  5:15 PM Rob Easter [R91.1] Pulmonary nodule     11/20/2023  5:15 PM Ike Cap Add [R10.30] Lower abdominal pain     11/20/2023  5:16 PM Ike Cap Modify [R91.1] Pulmonary nodule     11/20/2023  5:16 PM Ike Cap Modify [R10.30] Lower abdominal pain     11/20/2023  5:16 PM Ike Cap Add [R26.699] Crohn's disease, colon, with abscess ([address])     11/20/2023  5:24 PM Ike Cap Modify [R10.30] Lower abdominal pain     11/20/2023  5:24 PM Ike Cap Modify [K50.114] Crohn's disease, colon, with abscess [facility])           ED Disposition       ED Disposition   Admit    Condition   Stable    Date/Time   Mon Nov 20, 2023  5:24 PM    Comment   Case was discussed with Dr. Hannah Pagan and the patient's admission status was agreed to be Admission Status: inpatient status to the service of Dr. Hannah Pagan . Follow-up Information    None         Patient's Medications    No medications on file       No discharge procedures on file.     PDMP Review         Value Time User    PDMP Reviewed  Yes 10/27/2023 12:49 PM Leobardo Biswas PA-C            ED Provider  Electronically Signed by             Ike Elliott DO  11/20/23 6392

## 2023-11-20 NOTE — H&P
[address] [facility]  H&P  Name: Kelsea Mathis 28 y.o. male I MRN: [id number]  Unit/Bed#: MS Aly-01 I Date of Admission: 11/20/2023   Date of Service: 11/20/2023 I Hospital Day: 0      Assessment/Plan   * Intestinal abscess  Assessment & Plan  Presents for worsening abdominal pain x 2 days, reports mild abdominal pain since discharge on 10/27/23. Repeat CT today "Interval enlargement of the mesenteric multiloculated rim-enhancing collection between the inflamed sigmoid colon and distal ileum. It is presumed to be abscess, but fistulous communication is difficult to completely exclude. No gas within the collection. Concern for stricture of the affected segment of terminal and distal ileum, unchanged."  Start Zosyn  Consult Surgery for possible I&D  Consult GI  NPO at midnight    Crohn's disease of both small and large intestine with abscess ([address])  Assessment & Plan  Recently diagnosed with Chron's disease  Follows with GI outpatient    Lung nodule  Assessment & Plan  CT imaging "Indeterminate solid pulmonary nodule measuring 6 mm. "  Reports tobacco use x 13 years  Recommend repeat CT in 3-6 months to monitor lung nodule    Cigarette nicotine dependence without complication  Assessment & Plan  W/ lung nodule 6mm that will require follow up  Provided with nicotine patch  Pt is motivated to quit           VTE Pharmacologic Prophylaxis:   Low Risk (Score 0-2) - Encourage Ambulation. Code Status: Prior level 1 full code  Discussion with family: Updated  (wife) at bedside. Anticipated Length of Stay: Patient will be admitted on an inpatient basis with an anticipated length of stay of greater than 2 midnights secondary to worsening intestinal abscess. Total Time Spent on Date of Encounter in care of patient: 60 mins.  This time was spent on one or more of the following: performing physical exam; counseling and coordination of care; obtaining or reviewing history; documenting in the medical record; reviewing/ordering tests, medications or procedures; communicating with other healthcare professionals and discussing with patient's family/caregivers. Chief Complaint: Abdominal pain    History of Present Illness:  Nate Suarez is a 28 y.o. male with a PMH of tobacco use and Crohn's disease with recently found abscess who presents with worsening abdominal pain x2 days. Patient was admitted on 10/26-10/27 and was diagnosed with Crohn's with a small intestinal abscess which was nondrainable, discharged on antibiotics for course of 12 days. Reports very mild abdominal pain on discharge, however acutely worse in the last 2 days, sharp/stabbing in nature. Denies any fevers, diarrhea, bloody stools. Underwent CT imaging which shows worsening abscess. Patient started on IV Zosyn and will be admitted for GI and surgical evaluation. Review of Systems:  Review of Systems   Constitutional:  Negative for appetite change, chills, fatigue and fever. HENT:  Negative for ear pain, sore throat and trouble swallowing. Eyes:  Negative for visual disturbance. Respiratory:  Negative for cough, chest tightness, shortness of breath and wheezing. Cardiovascular:  Negative for chest pain, palpitations and leg swelling. Gastrointestinal:  Positive for abdominal pain. Negative for abdominal distention, diarrhea, nausea and vomiting. Endocrine: Negative. Genitourinary:  Negative for dysuria, flank pain and hematuria. Musculoskeletal:  Negative for arthralgias, gait problem and myalgias. Skin:  Negative for pallor. Allergic/Immunologic: Negative for immunocompromised state. Neurological:  Negative for dizziness, syncope, light-headedness, numbness and headaches. Past Medical and Surgical History:   Past Medical History:   Diagnosis Date    Crohn's colitis ([address])        No past surgical history on file.     Meds/Allergies:  Prior to Admission medications Not on File     I have reviewed home medications with patient personally. Allergies: Allergies   Allergen Reactions    Ammonia Nasal Congestion       Social History:  Marital Status: Single   Occupation: Noncontributory  Patient Pre-hospital Living Situation: Home  Patient Pre-hospital Level of Mobility: walks  Patient Pre-hospital Diet Restrictions: None  Substance Use History:   Social History     Substance and Sexual Activity   Alcohol Use Never     Social History     Tobacco Use   Smoking Status Every Day    Packs/day: 0.50    Types: Cigarettes   Smokeless Tobacco Never     Social History     Substance and Sexual Activity   Drug Use Yes    Types: Marijuana       Family History:  Family History   Problem Relation Age of Onset    Colon cancer Neg Hx     Colon polyps Neg Hx        Physical Exam:     Vitals:   Blood Pressure: 116/76 (11/20/23 1825)  Pulse: 86 (11/20/23 1518)  Temperature: 97.7 °F (36.5 °C) (11/20/23 1825)  Temp Source: Temporal (11/20/23 1825)  Respirations: 18 (11/20/23 1518)  SpO2: 99 % (11/20/23 1518)    Physical Exam  Vitals and nursing note reviewed. Constitutional:       Appearance: Normal appearance. HENT:      Head: Normocephalic and atraumatic. Mouth/Throat:      Mouth: Mucous membranes are moist.      Pharynx: Oropharynx is clear. No oropharyngeal exudate. Eyes:      Extraocular Movements: Extraocular movements intact. Cardiovascular:      Rate and Rhythm: Normal rate and regular rhythm. Pulses: Normal pulses. Heart sounds: Normal heart sounds. No murmur heard. No friction rub. No gallop. Pulmonary:      Effort: Pulmonary effort is normal. No respiratory distress. Breath sounds: Normal breath sounds. No stridor. No wheezing or rales. Abdominal:      General: Abdomen is flat. Bowel sounds are normal. There is no distension. Palpations: Abdomen is soft. Tenderness: There is abdominal tenderness.    Musculoskeletal:      Right lower leg: No edema. Left lower leg: No edema. Skin:     General: Skin is warm and dry. Neurological:      General: No focal deficit present. Mental Status: He is alert and oriented to person, place, and time. Additional Data:     Lab Results:  Results from last 7 days   Lab Units 11/20/23  1532   WBC Thousand/uL 9.79   HEMOGLOBIN g/dL 12.3   HEMATOCRIT % 38.8   PLATELETS Thousands/uL 434*   LYMPHO PCT % 13*   MONO PCT % 3*   EOS PCT % 5     Results from last 7 days   Lab Units 11/20/23  1532   SODIUM mmol/L 133*   POTASSIUM mmol/L 3.9   CHLORIDE mmol/L 96   CO2 mmol/L 29   BUN mg/dL 14   CREATININE mg/dL 0.89   ANION GAP mmol/L 8   CALCIUM mg/dL 9.4   ALBUMIN g/dL 3.8   TOTAL BILIRUBIN mg/dL 0.46   ALK PHOS U/L 57   ALT U/L 10   AST U/L 12*   GLUCOSE RANDOM mg/dL 128                 Results from last 7 days   Lab Units 11/20/23  1532   LACTIC ACID mmol/L 0.7       Lines/Drains:  Invasive Devices       Peripheral Intravenous Line  Duration             Peripheral IV 11/20/23 Distal;Right;Upper;Ventral (anterior) Arm <1 day    Peripheral IV 11/20/23 Right;Upper;Ventral (anterior) Arm <1 day                        Imaging: Reviewed radiology reports from this admission including: abdominal/pelvic CT  CT abdomen pelvis with contrast   Final Result by Dangelo Dye MD (11/20 1714)   Interval enlargement of the mesenteric multiloculated rim-enhancing collection between the inflamed sigmoid colon and distal ileum. It is presumed to be abscess, but fistulous communication is difficult to completely exclude. No gas within the    collection. Concern for stricture of the affected segment of terminal and distal ileum, unchanged. No new abnormalities. Indeterminate solid pulmonary nodule measuring 6 mm. In a low-risk patient with a solid nodule of 6-8 mm, recommend CT at 6-12 months, then consider CT at 18-24 months.             Workstation performed: [id number]             EKG and Other Studies Reviewed on Admission:   EKG: No EKG obtained. ** Please Note: This note has been constructed using a voice recognition system.  **

## 2023-11-20 NOTE — INCIDENTAL FINDINGS
The following findings require follow up:  Radiographic finding   Findinmm lung nodule on CT imaging   Follow up required: Yes   Follow up should be done in 3-6 months with repeat CT of the chest given 13+ years of tobacco use

## 2023-11-20 NOTE — PLAN OF CARE
Problem: PAIN - ADULT  Goal: Verbalizes/displays adequate comfort level or baseline comfort level  Description: Interventions:  - Encourage patient to monitor pain and request assistance  - Assess pain using appropriate pain scale  - Administer analgesics based on type and severity of pain and evaluate response  - Implement non-pharmacological measures as appropriate and evaluate response  - Consider cultural and social influences on pain and pain management  - Notify physician/advanced practitioner if interventions unsuccessful or patient reports new pain  Outcome: Progressing     Problem: INFECTION - ADULT  Goal: Absence or prevention of progression during hospitalization  Description: INTERVENTIONS:  - Assess and monitor for signs and symptoms of infection  - Monitor lab/diagnostic results  - Monitor all insertion sites, i.e. indwelling lines, tubes, and drains  - Monitor endotracheal if appropriate and nasal secretions for changes in amount and color  - [place] appropriate cooling/warming therapies per order  - Administer medications as ordered  - Instruct and encourage patient and family to use good hand hygiene technique  - Identify and instruct in appropriate isolation precautions for identified infection/condition  Outcome: Progressing     Problem: SAFETY ADULT  Goal: Patient will remain free of falls  Description: INTERVENTIONS:  - Educate patient/family on patient safety including physical limitations  - Instruct patient to call for assistance with activity   - Consult OT/PT to assist with strengthening/mobility   - Keep Call bell within reach  - Keep bed low and locked with side rails adjusted as appropriate  - Keep care items and personal belongings within reach  - Initiate and maintain comfort rounds  - Make Fall Risk Sign visible to staff  - Offer Toileting every 2 Hours, in advance of need  - Initiate/Maintain alarm  - Obtain necessary fall risk management equipment  - Apply yellow socks and bracelet for high fall risk patients  - Consider moving patient to room near nurses station  Outcome: Progressing  Goal: Maintain or return to baseline ADL function  Description: INTERVENTIONS:  -  Assess patient's ability to carry out ADLs; assess patient's baseline for ADL function and identify physical deficits which impact ability to perform ADLs (bathing, care of mouth/teeth, toileting, grooming, dressing, etc.)  - Assess/evaluate cause of self-care deficits   - Assess range of motion  - Assess patient's mobility; develop plan if impaired  - Assess patient's need for assistive devices and provide as appropriate  - Encourage maximum independence but intervene and supervise when necessary  - Involve family in performance of ADLs  - Assess for home care needs following discharge   - Consider OT consult to assist with ADL evaluation and planning for discharge  - Provide patient education as appropriate  Outcome: Progressing  Goal: Maintains/Returns to pre admission functional level  Description: INTERVENTIONS:  - Perform AM-PAC 6 Click Basic Mobility/ Daily Activity assessment daily.  - Set and communicate daily mobility goal to care team and patient/family/caregiver. - Collaborate with rehabilitation services on mobility goals if consulted  - Perform Range of Motion 3 times a day. - Reposition patient every 3 hours.   - Dangle patient 3 times a day  - Stand patient 3 times a day  - Ambulate patient 3 times a day  - Out of bed to chair 3 times a day   - Out of bed for meals 3 times a day  - Out of bed for toileting  - Record patient progress and toleration of activity level   Outcome: Progressing     Problem: DISCHARGE PLANNING  Goal: Discharge to home or other facility with appropriate resources  Description: INTERVENTIONS:  - Identify barriers to discharge w/patient and caregiver  - Arrange for needed discharge resources and transportation as appropriate  - Identify discharge learning needs (meds, wound care, etc.)  - Arrange for interpretive services to assist at discharge as needed  - Refer to Case Management Department for coordinating discharge planning if the patient needs post-hospital services based on physician/advanced practitioner order or complex needs related to functional status, cognitive ability, or social support system  Outcome: Progressing     Problem: Knowledge Deficit  Goal: Patient/family/caregiver demonstrates understanding of disease process, treatment plan, medications, and discharge instructions  Description: Complete learning assessment and assess knowledge base.   Interventions:  - Provide teaching at level of understanding  - Provide teaching via preferred learning methods  Outcome: Progressing

## 2023-11-20 NOTE — ASSESSMENT & PLAN NOTE
W/ lung nodule 6mm that will require follow up  Provided with nicotine patch  Pt is motivated to quit

## 2023-11-21 PROBLEM — D64.9 ANEMIA: Status: ACTIVE | Noted: 2023-11-21

## 2023-11-21 LAB
ALBUMIN SERPL BCP-MCNC: 3.3 G/DL (ref 3.5–5)
ALP SERPL-CCNC: 54 U/L (ref 34–104)
ALT SERPL W P-5'-P-CCNC: 8 U/L (ref 7–52)
ANION GAP SERPL CALCULATED.3IONS-SCNC: 7 MMOL/L
AST SERPL W P-5'-P-CCNC: 9 U/L (ref 13–39)
BASOPHILS # BLD AUTO: 0.07 THOUSANDS/ÂΜL (ref 0–0.1)
BASOPHILS NFR BLD AUTO: 1 % (ref 0–1)
BILIRUB SERPL-MCNC: 0.28 MG/DL (ref 0.2–1)
BUN SERPL-MCNC: 16 MG/DL (ref 5–25)
CALCIUM ALBUM COR SERPL-MCNC: 9.3 MG/DL (ref 8.3–10.1)
CALCIUM SERPL-MCNC: 8.7 MG/DL (ref 8.4–10.2)
CHLORIDE SERPL-SCNC: 104 MMOL/L (ref 96–108)
CO2 SERPL-SCNC: 25 MMOL/L (ref 21–32)
CREAT SERPL-MCNC: 0.92 MG/DL (ref 0.6–1.3)
EOSINOPHIL # BLD AUTO: 0.45 THOUSAND/ÂΜL (ref 0–0.61)
EOSINOPHIL NFR BLD AUTO: 4 % (ref 0–6)
ERYTHROCYTE [DISTWIDTH] IN BLOOD BY AUTOMATED COUNT: 12.4 % (ref 11.6–15.1)
GFR SERPL CREATININE-BSD FRML MDRD: 108 ML/MIN/1.73SQ M
GLUCOSE SERPL-MCNC: 137 MG/DL (ref 65–140)
HCT VFR BLD AUTO: 33.5 % (ref 36.5–49.3)
HGB BLD-MCNC: 10.7 G/DL (ref 12–17)
IMM GRANULOCYTES # BLD AUTO: 0.06 THOUSAND/UL (ref 0–0.2)
IMM GRANULOCYTES NFR BLD AUTO: 1 % (ref 0–2)
LYMPHOCYTES # BLD AUTO: 1.38 THOUSANDS/ÂΜL (ref 0.6–4.47)
LYMPHOCYTES NFR BLD AUTO: 12 % (ref 14–44)
MCH RBC QN AUTO: 27.1 PG (ref 26.8–34.3)
MCHC RBC AUTO-ENTMCNC: 31.9 G/DL (ref 31.4–37.4)
MCV RBC AUTO: 85 FL (ref 82–98)
MONOCYTES # BLD AUTO: 1.19 THOUSAND/ÂΜL (ref 0.17–1.22)
MONOCYTES NFR BLD AUTO: 11 % (ref 4–12)
NEUTROPHILS # BLD AUTO: 8.04 THOUSANDS/ÂΜL (ref 1.85–7.62)
NEUTS SEG NFR BLD AUTO: 71 % (ref 43–75)
NRBC BLD AUTO-RTO: 0 /100 WBCS
PLATELET # BLD AUTO: 400 THOUSANDS/UL (ref 149–390)
PMV BLD AUTO: 8.9 FL (ref 8.9–12.7)
POTASSIUM SERPL-SCNC: 4.1 MMOL/L (ref 3.5–5.3)
PROT SERPL-MCNC: 6.5 G/DL (ref 6.4–8.4)
RBC # BLD AUTO: 3.95 MILLION/UL (ref 3.88–5.62)
SODIUM SERPL-SCNC: 136 MMOL/L (ref 135–147)
WBC # BLD AUTO: 11.19 THOUSAND/UL (ref 4.31–10.16)

## 2023-11-21 PROCEDURE — 99232 SBSQ HOSP IP/OBS MODERATE 35: CPT | Performed by: PHYSICIAN ASSISTANT

## 2023-11-21 PROCEDURE — 99232 SBSQ HOSP IP/OBS MODERATE 35: CPT | Performed by: INTERNAL MEDICINE

## 2023-11-21 PROCEDURE — 99223 1ST HOSP IP/OBS HIGH 75: CPT | Performed by: INTERNAL MEDICINE

## 2023-11-21 PROCEDURE — 87505 NFCT AGENT DETECTION GI: CPT | Performed by: INTERNAL MEDICINE

## 2023-11-21 PROCEDURE — 85025 COMPLETE CBC W/AUTO DIFF WBC: CPT | Performed by: PHYSICIAN ASSISTANT

## 2023-11-21 PROCEDURE — 80053 COMPREHEN METABOLIC PANEL: CPT | Performed by: PHYSICIAN ASSISTANT

## 2023-11-21 PROCEDURE — 87493 C DIFF AMPLIFIED PROBE: CPT | Performed by: INTERNAL MEDICINE

## 2023-11-21 RX ADMIN — PIPERACILLIN AND TAZOBACTAM 3.38 G: 3; .375 INJECTION, POWDER, LYOPHILIZED, FOR SOLUTION INTRAVENOUS at 12:22

## 2023-11-21 RX ADMIN — NICOTINE 14 MG: 14 PATCH, EXTENDED RELEASE TRANSDERMAL at 09:34

## 2023-11-21 RX ADMIN — KETOROLAC TROMETHAMINE 15 MG: 30 INJECTION, SOLUTION INTRAMUSCULAR; INTRAVENOUS at 09:33

## 2023-11-21 RX ADMIN — PIPERACILLIN AND TAZOBACTAM 3.38 G: 3; .375 INJECTION, POWDER, LYOPHILIZED, FOR SOLUTION INTRAVENOUS at 00:56

## 2023-11-21 RX ADMIN — PIPERACILLIN AND TAZOBACTAM 3.38 G: 3; .375 INJECTION, POWDER, LYOPHILIZED, FOR SOLUTION INTRAVENOUS at 05:47

## 2023-11-21 RX ADMIN — PIPERACILLIN AND TAZOBACTAM 3.38 G: 3; .375 INJECTION, POWDER, LYOPHILIZED, FOR SOLUTION INTRAVENOUS at 17:54

## 2023-11-21 NOTE — CASE MANAGEMENT
Case Management Progress Note    Patient name Kaykay Means  Location /-87 MRN [id number]  : 1990 Date 2023       LOS (days): 1  Geometric Mean LOS (GMLOS) (days):   Days to GMLOS:        OBJECTIVE:        Current admission status: Inpatient  Preferred Pharmacy:   [address] [place] [address]  Phone: [phone] Fax: [phone]    Primary Care Provider: No primary care provider on file. Primary Insurance: BLUE CROSS  Secondary Insurance:     PROGRESS NOTE:    CM spoke to pt's bedside RN Pavel Perez who states pt is indptly ambulating with no use of DME. No CM needs identified.

## 2023-11-21 NOTE — ASSESSMENT & PLAN NOTE
Presents for worsening abdominal pain x 2 days, reports mild abdominal pain since discharge on 10/27/23. Repeat CT today "Interval enlargement of the mesenteric multiloculated rim-enhancing collection between the inflamed sigmoid colon and distal ileum. It is presumed to be abscess, but fistulous communication is difficult to completely exclude. No gas within the collection. Concern for stricture of the affected segment of terminal and distal ileum, unchanged."  IR consulted. Per their note, patient is not amenable to have percutaneous drainage  Surgery consulted- plan for exploratory laparoscopy     Plan:   Continue zosyn  Continue IV fluids   Pain management   NPO for now.  If no procedure today, will start diet

## 2023-11-21 NOTE — CONSULTS
Consultation - GI   Woodson Darrian 28 y.o. male MRN: [id number]  Unit/Bed#: -01 Encounter: [id number]      Assessment/Plan     28y.o. year old male with past medical history of tobacco use and possible Crohn's disease with recent finding of abscess between the inflamed sigmoid colon and the distal ileum. 1.  Intestinal abscess  Patient had presented to the emergency room with worsening abdominal pain over the past 2 days. On exam patient does exhibit right lower quadrant abdominal discomfort. Patient had recently been discharged after 2-day hospital stay on 10/27. CT 11/20; "Interval enlargement of the mesenteric multiloculated rim-enhancing collection between the inflamed sigmoid colon and distal ileum. It is presumed to be abscess, but fistulous communication is difficult to completely exclude. No gas within the collection. Concern for stricture of the affected segment of terminal and distal ileum, unchanged. "     -Surgery consulted  -IR consulted for possible abscess drain placement. Per IR consult note, imaging reviewed with surgery and plan for surgical exploration. "CT of 11/20/23 showing increasing multiloculated collection in the mid pelvis surrounded by bowel loops anteriorly and pelvis posteriorly and not amenable for percutaneous drain placement."     -IV Zosyn  -Patient is currently NPO. 2.  Crohn's disease  Per patient's office visit note 11/1, patient was previously evaluated in 2019 for abdominal pain and change in bowel habits. CT scan with ileitis. Colonoscopy with ileitis. Suspected Crohn's disease was a diagnosis but that was never followed up. Patient has been scheduled for colonoscopy and possibly MRI enterography. Also noted possible abscess with fistulous tract to the ileum. 3.  Nicotine dependence without complication  Nicotine patch ordered. 4.  Unintentional weight loss.   Patient states he has had approximately 11 pound weight loss in the past 1 to 2 months. He does state he has had a decreased appetite since his symptoms have began however states this is the lightest weight he has been in a long time. Discuss patient with Dr. Sami Gunderson on rounds. Patient likely needs colonoscopy since he has not had one to determine whether or not Crohn's is present and may require EGD as well if continued weight loss. Consults    Physician Requesting Consult: Pham Newton MD  Reason for Consult / Principal Problem: Abdominal pain, Crohn's with abscess    HPI: Malorie Irvin is a 28y.o. year old male with past medical history of tobacco use and possible Crohn's disease with recent finding of abscess between the inflamed sigmoid colon and the distal ileum. Patient had previous admission 10/26 for same symptoms however patient's abdominal tenderness has progressively gotten worse. On exam, patient does demonstrate right lower quadrant abdominal tenderness. Patient denies nausea or vomiting. Patient states he has had multiple bowel movements this morning already. States his bowel movements have been seminormal however states he has had periods of what appears to be tissue in appearance with some blood strands noted. Per patient's office visit note 11/1, patient was previously evaluated in 2019 for abdominal pain and change in bowel habits. CT scan with ileitis. Colonoscopy with ileitis. Suspected Crohn's disease was a diagnosis but that was never followed up. Patient does state that he has had an approximate 11 pound weight loss over the past month and 1/2 to 2 months. States he is at his lowest weight in a long time. Review of Systems: Negative for 14 point exam except for HPI. Historical Information   Past Medical History:   Diagnosis Date    Crohn's colitis ([address])      No past surgical history on file.   Social History   Social History     Substance and Sexual Activity   Alcohol Use Never     Social History     Substance and Sexual Activity   Drug Use Yes    Types: Marijuana     Social History     Tobacco Use   Smoking Status Every Day    Packs/day: 0.50    Types: Cigarettes   Smokeless Tobacco Never     Family History   Problem Relation Age of Onset    Colon cancer Neg Hx     Colon polyps Neg Hx        Meds/Allergies   Current Facility-Administered Medications   Medication Dose Route Frequency    acetaminophen (TYLENOL) tablet 975 mg  975 mg Oral Q8H PRN    ketorolac (TORADOL) injection 15 mg  15 mg Intravenous Q6H PRN    nicotine (NICODERM CQ) 14 mg/24hr TD 24 hr patch 14 mg  14 mg Transdermal Daily    piperacillin-tazobactam (ZOSYN) IVPB 3.375 g  3.375 g Intravenous Q6H     No medications prior to admission. Allergies   Allergen Reactions    Ammonia Nasal Congestion           Physical Exam   Constitutional: Is oriented to person, place, and time. Appears well-developed and well-nourished. HENT: WNL  Head: Normocephalic and atraumatic. Eyes: Pupils are equal, round, and reactive to light. Neck: Normal range of motion. Neck supple. Cardiovascular: Normal rate and regular rhythm. Pulmonary/Chest: Effort normal and breath sounds normal.   Abdominal: Soft. Right lower quadrant abdominal tenderness with palpation, bowel sounds are normal.   Musculoskeletal: Normal range of motion. Extremities:  No edema  Neurological: Is alert and oriented to person, place, and time. Skin: Skin is warm and dry. Psychiatric: Has a normal mood and affect.        Lab Results:   Admission on 11/20/2023   Component Date Value    WBC 11/20/2023 9.79     RBC 11/20/2023 4.55     Hemoglobin 11/20/2023 12.3     Hematocrit 11/20/2023 38.8     MCV 11/20/2023 85     MCH 11/20/2023 27.0     MCHC 11/20/2023 31.7     RDW 11/20/2023 12.3     MPV 11/20/2023 8.9     Platelets 68/90/4000 434 (H)     Sodium 11/20/2023 133 (L)     Potassium 11/20/2023 3.9     Chloride 11/20/2023 96     CO2 11/20/2023 29     ANION GAP 11/20/2023 8     BUN 11/20/2023 14 Creatinine 11/20/2023 0.89     Glucose 11/20/2023 128     Calcium 11/20/2023 9.4     AST 11/20/2023 12 (L)     ALT 11/20/2023 10     Alkaline Phosphatase 11/20/2023 57     Total Protein 11/20/2023 7.8     Albumin 11/20/2023 3.8     Total Bilirubin 11/20/2023 0.46     eGFR 11/20/2023 111     Lipase 11/20/2023 17     LACTIC ACID 11/20/2023 0.7     Color, UA 11/20/2023 Light Yellow     Clarity, UA 11/20/2023 Clear     Specific Gravity, UA 11/20/2023 <1.005 (L)     pH, UA 11/20/2023 6.0     Leukocytes, UA 11/20/2023 Negative     Nitrite, UA 11/20/2023 Negative     Protein, UA 11/20/2023 Negative     Glucose, UA 11/20/2023 Negative     Ketones, UA 11/20/2023 Negative     Urobilinogen, UA 11/20/2023 <2.0     Bilirubin, UA 11/20/2023 Negative     Occult Blood, UA 11/20/2023 Negative     Segmented % 11/20/2023 76 (H)     Lymphocytes % 11/20/2023 13 (L)     Monocytes % 11/20/2023 3 (L)     Eosinophils, % 11/20/2023 5     Basophils % 11/20/2023 0     Atypical Lymphocytes % 11/20/2023 3 (H)     Absolute Neutrophils 11/20/2023 7.44     Lymphocytes Absolute 11/20/2023 1.57     Monocytes Absolute 11/20/2023 0.29     Eosinophils Absolute 11/20/2023 0.49 (H)     Basophils Absolute 11/20/2023 0.00     RBC Morphology 11/20/2023 Present     Platelet Estimate 16/14/5386 Adequate     Large Platelet 24/69/7076 Present     Microcytes 11/20/2023 Present     Blood Culture 11/20/2023 Received in Microbiology Lab. Culture in Progress. Blood Culture 11/20/2023 Received in Microbiology Lab. Culture in Progress.      Sodium 11/21/2023 136     Potassium 11/21/2023 4.1     Chloride 11/21/2023 104     CO2 11/21/2023 25     ANION GAP 11/21/2023 7     BUN 11/21/2023 16     Creatinine 11/21/2023 0.92     Glucose 11/21/2023 137     Calcium 11/21/2023 8.7     Corrected Calcium 11/21/2023 9.3     AST 11/21/2023 9 (L)     ALT 11/21/2023 8     Alkaline Phosphatase 11/21/2023 54     Total Protein 11/21/2023 6.5     Albumin 11/21/2023 3.3 (L)     Total Bilirubin 11/21/2023 0.28     eGFR 11/21/2023 108     WBC 11/21/2023 11.19 (H)     RBC 11/21/2023 3.95     Hemoglobin 11/21/2023 10.7 (L)     Hematocrit 11/21/2023 33.5 (L)     MCV 11/21/2023 85     MCH 11/21/2023 27.1     MCHC 11/21/2023 31.9     RDW 11/21/2023 12.4     MPV 11/21/2023 8.9     Platelets 31/15/9335 400 (H)     nRBC 11/21/2023 0     Neutrophils Relative 11/21/2023 71     Immat GRANS % 11/21/2023 1     Lymphocytes Relative 11/21/2023 12 (L)     Monocytes Relative 11/21/2023 11     Eosinophils Relative 11/21/2023 4     Basophils Relative 11/21/2023 1     Neutrophils Absolute 11/21/2023 8.04 (H)     Immature Grans Absolute 11/21/2023 0.06     Lymphocytes Absolute 11/21/2023 1.38     Monocytes Absolute 11/21/2023 1.19     Eosinophils Absolute 11/21/2023 0.45     Basophils Absolute 11/21/2023 0.07      Imaging Studies: I have personally reviewed pertinent reports. EKG, Pathology, and Other Studies: I have personally reviewed pertinent reports. Counseling / Coordination of Care  Total floor / unit time spent today 45 minutes. Principal Discharge DX:	Leg pain, diffuse, left

## 2023-11-21 NOTE — PROGRESS NOTES
Progress Note - General Surgery   Henri Toth 28 y.o. male MRN: [id number]  Unit/Bed#: -Reyna Encounter: [id number]    Assessment/Plan:  Intra-abdominal abscess  -Likely related to Crohn's disease  -Previous admission for similar symptoms with ileitis and adjacent abscess  -CT from last evening reviewed, previous abscess now enlarged, possibly related to fistula  -Mild leukocytosis, vital signs stable, abdominal exam without peritoneal signs  -IR consult pending, unsure if they will be able to drain collection  -Continue plan for conservative management at this time, IV antibiotics, bowel rest, IVFs  -If IR unable to drain collection, will need to consider diagnostic laparoscopy, drainage of abscess  -Plan discussed with patient at bedside this morning  -Continue to trend WBC, fever curve, vital signs    Crohn's disease  -Findings likely consistent with Crohn's disease  -Continue GI work-up, patient sees [facility]    Tobacco abuse  -With incidental findings of 6 mm lung nodule, will need outpatient follow-up  -Nicotine patch ordered  -Cessation recommended, especially in setting of likely Crohn's disease    Malnutrition  -Patient with significant weight loss over the past few months with worsening abdominal symptoms  -Nutrition consult      Subjective/Objective   Chief Complaint: abdominal pain    Subjective: Patient states he has never improved since previous admission. Complains of worsening pain and continued weight loss. Denies any fevers or chills. Objective:     Blood pressure 93/60, pulse 77, temperature 97.7 °F (36.5 °C), resp. rate 18, height 5' 8" (1.727 m), weight 63 kg (139 lb), SpO2 97 %. ,Body mass index is 21.13 kg/m².     No intake or output data in the 24 hours ending 11/21/23 0750    Invasive Devices       Peripheral Intravenous Line  Duration             Peripheral IV 11/20/23 Distal;Right;Upper;Ventral (anterior) Arm <1 day    Peripheral IV 11/20/23 Right;Upper;Ventral (anterior) Arm <1 day                    Physical Exam:   General appearance: alert and oriented, in no acute distress, thin  Head: Normocephalic, without obvious abnormality, atraumatic, sclerae anicteric, mucous membranes moist  Neck: no JVD and supple, symmetrical, trachea midline  Lungs: clear to auscultation, no wheezes or rales  Heart:   Regular rate and rhythm, S1-S2 normal, no murmur  Abdomen:   Flat, soft, tenderness across lower abdomen without rebound or guarding, few bowel sounds  Extremities:   No edema, redness or tenderness in the calves or thighs  Skin: Warm, dry  Nursing notes and vital signs reviewed      Lab, Imaging and other studies:I have personally reviewed pertinent lab results.   , CBC:   Lab Results   Component Value Date    WBC 11.19 (H) 11/21/2023    HGB 10.7 (L) 11/21/2023    HCT 33.5 (L) 11/21/2023    MCV 85 11/21/2023     (H) 11/21/2023    RBC 3.95 11/21/2023    MCH 27.1 11/21/2023    MCHC 31.9 11/21/2023    RDW 12.4 11/21/2023    MPV 8.9 11/21/2023    NRBC 0 11/21/2023   , CMP:   Lab Results   Component Value Date    SODIUM 136 11/21/2023    K 4.1 11/21/2023     11/21/2023    CO2 25 11/21/2023    BUN 16 11/21/2023    CREATININE 0.92 11/21/2023    CALCIUM 8.7 11/21/2023    AST 9 (L) 11/21/2023    ALT 8 11/21/2023    ALKPHOS 54 11/21/2023    EGFR 108 11/21/2023     VTE Pharmacologic Prophylaxis: on hold for possible procedure  VTE Mechanical Prophylaxis: sequential compression device    Poonam Gleason

## 2023-11-21 NOTE — PROGRESS NOTES
[facility]  Progress Note  Name: Jonny Armstrong  MRN: [id number]  Unit/Bed#: -01 I Date of Admission: 11/20/2023   Date of Service: 11/21/2023 I Hospital Day: 1    Assessment/Plan   * Intestinal abscess  Assessment & Plan  Presents for worsening abdominal pain x 2 days, reports mild abdominal pain since discharge on 10/27/23. Repeat CT today "Interval enlargement of the mesenteric multiloculated rim-enhancing collection between the inflamed sigmoid colon and distal ileum. It is presumed to be abscess, but fistulous communication is difficult to completely exclude. No gas within the collection. Concern for stricture of the affected segment of terminal and distal ileum, unchanged."  IR consulted. Per their note, patient is not amenable to have percutaneous drainage  Surgery consulted- plan for exploratory laparoscopy     Plan:   Continue zosyn  Continue IV fluids   Pain management   NPO for now. If no procedure today, will start diet     Crohn's disease of both small and large intestine with abscess [facility])  Assessment & Plan  Colonoscopy that showed ileitis in the past (2019) suspicion for Crohn disease however patient did not follow-up. Has a colonoscopy and MRI enterography scheduled after was seen in the GI office recently  GI was consulted. Appreciate recommendations   Patient reported diarrhea in the past few days, that was watery and formed stool. This am patient had loose BM, no concern for C diff at this time.      Anemia  Assessment & Plan  Recent Labs     11/20/23  1532 11/21/23  0455   HGB 12.3 10.7*      Per GI, patient had some blood strakes in the stool ( noticed on the picture that the patient took prior to admission)  Monitor     Lung nodule  Assessment & Plan  CT imaging "Indeterminate solid pulmonary nodule measuring 6 mm. "  Reports tobacco use x 13 years  Recommend repeat CT in 3-6 months to monitor lung nodule    Cigarette nicotine dependence without complication  Assessment & Plan  W/ lung nodule 6mm that will require follow up  Provided with nicotine patch  Pt is motivated to quit         VTE Pharmacologic Prophylaxis: VTE Score: 2 Low Risk (Score 0-2) - Encourage Ambulation. Mobility:   Basic Mobility Inpatient Raw Score: 24  JH-HLM Goal: 8: Walk 250 feet or more  JH-HLM Achieved: 8: Walk 250 feet ot more  HLM Goal achieved. Continue to encourage appropriate mobility. Patient Centered Rounds: I performed bedside rounds with nursing staff today. Discussions with Specialists or Other Care Team Provider: surgery, GI, cm,     Education and Discussions with Family / Patient: Updated  (wife) at bedside. Total Time Spent on Date of Encounter in care of patient: 60 mins. This time was spent on one or more of the following: performing physical exam; counseling and coordination of care; obtaining or reviewing history; documenting in the medical record; reviewing/ordering tests, medications or procedures; communicating with other healthcare professionals and discussing with patient's family/caregivers. Current Length of Stay: 1 day(s)  Current Patient Status: Inpatient   Certification Statement: The patient will continue to require additional inpatient hospital stay due to intraabdominal abscess   Discharge Plan: Anticipate discharge in 48 hrs to home. Code Status: Level 1 - Full Code    Subjective:   Pain is better. Had BM last night, mix formed and watery. NO N/V    Objective:     Vitals:   Temp (24hrs), Av.7 °F (36.5 °C), Min:97.5 °F (36.4 °C), Max:97.7 °F (36.5 °C)    Temp:  [97.5 °F (36.4 °C)-97.7 °F (36.5 °C)] 97.5 °F (36.4 °C)  HR:  [75-86] 75  Resp:  [18-19] 19  BP: ()/(60-76) 102/71  SpO2:  [97 %-99 %] 98 %  Body mass index is 21.13 kg/m².      Input and Output Summary (last 24 hours):   No intake or output data in the 24 hours ending 23 1150    Physical Exam:   Physical Exam  Vitals and nursing note reviewed. Constitutional:       General: He is not in acute distress. Appearance: He is not diaphoretic. HENT:      Head: Normocephalic. Eyes:      General: No scleral icterus. Right eye: No discharge. Left eye: No discharge. Cardiovascular:      Rate and Rhythm: Normal rate and regular rhythm. Pulmonary:      Effort: Pulmonary effort is normal. No respiratory distress. Breath sounds: Normal breath sounds. No wheezing, rhonchi or rales. Abdominal:      General: There is no distension. Palpations: Abdomen is soft. Tenderness: There is abdominal tenderness (lower abdomen). There is no guarding or rebound. Musculoskeletal:      Cervical back: Normal range of motion. Right lower leg: No edema. Left lower leg: No edema. Skin:     General: Skin is warm. Coloration: Skin is pale. Neurological:      Mental Status: He is alert and oriented to person, place, and time. Psychiatric:         Mood and Affect: Mood normal.         Behavior: Behavior normal.         Thought Content:  Thought content normal.         Judgment: Judgment normal.         Additional Data:     Labs:  Results from last 7 days   Lab Units 11/21/23  0455   WBC Thousand/uL 11.19*   HEMOGLOBIN g/dL 10.7*   HEMATOCRIT % 33.5*   PLATELETS Thousands/uL 400*   NEUTROS PCT % 71   LYMPHS PCT % 12*   MONOS PCT % 11   EOS PCT % 4     Results from last 7 days   Lab Units 11/21/23  0455   SODIUM mmol/L 136   POTASSIUM mmol/L 4.1   CHLORIDE mmol/L 104   CO2 mmol/L 25   BUN mg/dL 16   CREATININE mg/dL 0.92   ANION GAP mmol/L 7   CALCIUM mg/dL 8.7   ALBUMIN g/dL 3.3*   TOTAL BILIRUBIN mg/dL 0.28   ALK PHOS U/L 54   ALT U/L 8   AST U/L 9*   GLUCOSE RANDOM mg/dL 137                 Results from last 7 days   Lab Units 11/20/23  1532   LACTIC ACID mmol/L 0.7       Lines/Drains:  Invasive Devices       Peripheral Intravenous Line  Duration             Peripheral IV 11/20/23 Right;Upper;Ventral (anterior) Arm <1 day                          Imaging: Reviewed radiology reports from this admission including: abdominal/pelvic CT    Recent Cultures (last 7 days):   Results from last 7 days   Lab Units 11/20/23  2314 11/20/23  1805   BLOOD CULTURE  Received in Microbiology Lab. Culture in Progress. Received in Microbiology Lab. Culture in Progress. Last 24 Hours Medication List:   Current Facility-Administered Medications   Medication Dose Route Frequency Provider Last Rate    acetaminophen  975 mg Oral Q8H PRN Hoyt Cowden, PA-C      ketorolac  15 mg Intravenous Q6H PRN Hoyt Cowden, PA-C      nicotine  14 mg Transdermal Daily Tanya Fonseca PA-C      piperacillin-tazobactam  3.375 g Intravenous [address] [facility], [place]          Today, Patient Was Seen By: Dakotah Chu MD    **Please Note: This note may have been constructed using a voice recognition system. **

## 2023-11-21 NOTE — PLAN OF CARE
Problem: PAIN - ADULT  Goal: Verbalizes/displays adequate comfort level or baseline comfort level  Description: Interventions:  - Encourage patient to monitor pain and request assistance  - Assess pain using appropriate pain scale  - Administer analgesics based on type and severity of pain and evaluate response  - Implement non-pharmacological measures as appropriate and evaluate response  - Consider cultural and social influences on pain and pain management  - Notify physician/advanced practitioner if interventions unsuccessful or patient reports new pain  Outcome: Progressing     Problem: INFECTION - ADULT  Goal: Absence or prevention of progression during hospitalization  Description: INTERVENTIONS:  - Assess and monitor for signs and symptoms of infection  - Monitor lab/diagnostic results  - Monitor all insertion sites, i.e. indwelling lines, tubes, and drains  - Monitor endotracheal if appropriate and nasal secretions for changes in amount and color  - [place] appropriate cooling/warming therapies per order  - Administer medications as ordered  - Instruct and encourage patient and family to use good hand hygiene technique  - Identify and instruct in appropriate isolation precautions for identified infection/condition  Outcome: Progressing     Problem: SAFETY ADULT  Goal: Patient will remain free of falls  Description: INTERVENTIONS:  - Educate patient/family on patient safety including physical limitations  - Instruct patient to call for assistance with activity   - Consult OT/PT to assist with strengthening/mobility   - Keep Call bell within reach  - Keep bed low and locked with side rails adjusted as appropriate  - Keep care items and personal belongings within reach  - Initiate and maintain comfort rounds  - Make Fall Risk Sign visible to staff  - Offer Toileting every 2 Hours, in advance of need  - Initiate/Maintain 2alarm  - Obtain necessary fall risk management equipment:   - Apply yellow socks and bracelet for high fall risk patients  - Consider moving patient to room near nurses station  Outcome: Progressing

## 2023-11-21 NOTE — CONSULTS
Consultation -General Surgery  Amanda Bragg 28 y.o. male MRN: [id number]  Unit/Bed#: -01 Encounter: [id number]      ASSESSMENT:  29 yo M with recent hospitalization for terminal ileitis with undrainable abscess likely 2/2 crohn's disease here with worsening abdominal pain  CT with enlargement of multiloculated rim enhancing collection, presumed to be abscess but cannot exclude fistula  AVSS, WBC 9      Plan:  - no emergent surgical intervention warranted at this time  - recommend IR consult for evaluation for drainage  - cont IV abx  - prn pain, antiemetic regimen  - dvt ppx  - GI consult    Rest of care per primary      Reason for Consult / Principal Problem:    HPI: Amanda Bragg is a 28y.o. year old male who presents with Intestinal abscess. Pt was recently admitted from 10/26-10/27 for terminal ileitis with abscess, not accessible for percutaneous drainage per IR. He was discharged on abx with surgery and GI follow up. He was scheduled for MRE and c-scope in the next 1-2 months respectively. Per pt, he has had lower abdominal pain chronically since discharge. It is intermittent and typically resolves on its own. However, Saturday he began having worsening lower abdominal pain that has continued to worsen and not resolved. He endorses approx 15 episodes of mucus diarrhea since Saturday, nonbloody. Denies nausea or vomiting, but does endorse decreased appetite. Denies fevers, chills, cp, sob. On prior admission, pt was suspected to have crohn's disease given findings of skip areas on CT, however has been unable to obtain colonoscopy for diagnosis. Denies any family hx of IBD. Review of Systems   Constitutional:  Positive for appetite change. Negative for chills and fever. Respiratory: Negative. Cardiovascular: Negative. Gastrointestinal:  Positive for abdominal pain and diarrhea. Negative for blood in stool, constipation, nausea and vomiting.    Genitourinary: Negative. Skin: Negative. Neurological: Negative. All other systems reviewed and are negative. Historical Information   Past Medical History:   Diagnosis Date    Crohn's colitis ([address])      No past surgical history on file. Social History   Social History     Substance and Sexual Activity   Alcohol Use Never     Social History     Substance and Sexual Activity   Drug Use Yes    Types: Marijuana     Social History     Tobacco Use   Smoking Status Every Day    Packs/day: 0.50    Types: Cigarettes   Smokeless Tobacco Never     Family History   Problem Relation Age of Onset    Colon cancer Neg Hx     Colon polyps Neg Hx        Meds/Allergies     No medications prior to admission. Current Facility-Administered Medications   Medication Dose Route Frequency    acetaminophen (TYLENOL) tablet 975 mg  975 mg Oral Q8H PRN    ketorolac (TORADOL) injection 15 mg  15 mg Intravenous Q6H PRN    nicotine (NICODERM CQ) 14 mg/24hr TD 24 hr patch 14 mg  14 mg Transdermal Daily    [START ON 11/21/2023] piperacillin-tazobactam (ZOSYN) IVPB 3.375 g  3.375 g Intravenous Q6H       Allergies   Allergen Reactions    Ammonia Nasal Congestion       Objective     Blood pressure 116/76, pulse 86, temperature 97.7 °F (36.5 °C), temperature source Temporal, resp. rate 18, height 5' 8" (1.727 m), weight 63 kg (139 lb), SpO2 99 %. No intake or output data in the 24 hours ending 11/20/23 1900    PHYSICAL EXAM  Physical Exam  Vitals and nursing note reviewed. Constitutional:       General: He is not in acute distress. Appearance: Normal appearance. He is normal weight. He is not ill-appearing, toxic-appearing or diaphoretic. HENT:      Head: Normocephalic and atraumatic. Eyes:      Extraocular Movements: Extraocular movements intact. Cardiovascular:      Rate and Rhythm: Normal rate. Pulmonary:      Effort: Pulmonary effort is normal. No respiratory distress. Abdominal:      General: Abdomen is flat.  There is no distension. Palpations: Abdomen is soft. Tenderness: There is abdominal tenderness (mainly suprapubic tenderness but tender across entire lower abdomen). There is no guarding or rebound. Musculoskeletal:         General: No swelling or tenderness. Skin:     General: Skin is warm. Capillary Refill: Capillary refill takes less than 2 seconds. Neurological:      General: No focal deficit present. Mental Status: He is alert and oriented to person, place, and time. Psychiatric:         Mood and Affect: Mood normal.         Behavior: Behavior normal.           Lab Results: I have personally reviewed pertinent lab results. , CBC:   Lab Results   Component Value Date    WBC 9.79 11/20/2023    HGB 12.3 11/20/2023    HCT 38.8 11/20/2023    MCV 85 11/20/2023     (H) 11/20/2023    RBC 4.55 11/20/2023    MCH 27.0 11/20/2023    MCHC 31.7 11/20/2023    RDW 12.3 11/20/2023    MPV 8.9 11/20/2023   , CMP:   Lab Results   Component Value Date    SODIUM 133 (L) 11/20/2023    K 3.9 11/20/2023    CL 96 11/20/2023    CO2 29 11/20/2023    BUN 14 11/20/2023    CREATININE 0.89 11/20/2023    CALCIUM 9.4 11/20/2023    AST 12 (L) 11/20/2023    ALT 10 11/20/2023    ALKPHOS 57 11/20/2023    EGFR 111 11/20/2023   , Coagulation: No results found for: "PT", "INR", "APTT", Urinalysis:   Lab Results   Component Value Date    COLORU Light Yellow 11/20/2023    CLARITYU Clear 11/20/2023    SPECGRAV <1.005 (L) 11/20/2023    PHUR 6.0 11/20/2023    LEUKOCYTESUR Negative 11/20/2023    NITRITE Negative 11/20/2023    GLUCOSEU Negative 11/20/2023    KETONESU Negative 11/20/2023    BILIRUBINUR Negative 11/20/2023    BLOODU Negative 11/20/2023     Imaging: I have personally reviewed pertinent reports. 11/20 CTAP: IMPRESSION:  Interval enlargement of the mesenteric multiloculated rim-enhancing collection between the inflamed sigmoid colon and distal ileum.  It is presumed to be abscess, but fistulous communication is difficult to completely exclude. No gas within the   collection. Concern for stricture of the affected segment of terminal and distal ileum, unchanged. No new abnormalities. Indeterminate solid pulmonary nodule measuring 6 mm. In a low-risk patient with a solid nodule of 6-8 mm, recommend CT at 6-12 months, then consider CT at 18-24 months. EKG, Pathology, and Other Studies: I have personally reviewed pertinent reports. Counseling / Coordination of Care  Total time spent today  30 minutes. Greater than 50% of total time was spent with the patient and / or family counseling and / or coordination of care.          Ambrocio Lin PA-C  11/20/2023 7:00 PM

## 2023-11-21 NOTE — PLAN OF CARE
Problem: PAIN - ADULT  Goal: Verbalizes/displays adequate comfort level or baseline comfort level  Description: Interventions:  - Encourage patient to monitor pain and request assistance  - Assess pain using appropriate pain scale  - Administer analgesics based on type and severity of pain and evaluate response  - Implement non-pharmacological measures as appropriate and evaluate response  - Consider cultural and social influences on pain and pain management  - Notify physician/advanced practitioner if interventions unsuccessful or patient reports new pain  Outcome: Progressing     Problem: INFECTION - ADULT  Goal: Absence or prevention of progression during hospitalization  Description: INTERVENTIONS:  - Assess and monitor for signs and symptoms of infection  - Monitor lab/diagnostic results  - Monitor all insertion sites, i.e. indwelling lines, tubes, and drains  - Monitor endotracheal if appropriate and nasal secretions for changes in amount and color  - [place] appropriate cooling/warming therapies per order  - Administer medications as ordered  - Instruct and encourage patient and family to use good hand hygiene technique  - Identify and instruct in appropriate isolation precautions for identified infection/condition  Outcome: Progressing     Problem: SAFETY ADULT  Goal: Patient will remain free of falls  Description: INTERVENTIONS:  - Educate patient/family on patient safety including physical limitations  - Instruct patient to call for assistance with activity   - Consult OT/PT to assist with strengthening/mobility   - Keep Call bell within reach  - Keep bed low and locked with side rails adjusted as appropriate  - Keep care items and personal belongings within reach  - Initiate and maintain comfort rounds  - Make Fall Risk Sign visible to staff  - Offer Toileting every 2 Hours, in advance of need  - Initiate/Maintain bed alarm  - Obtain necessary fall risk management equipment:   - Apply yellow socks and bracelet for high fall risk patients  - Consider moving patient to room near nurses station  Outcome: Progressing  Goal: Maintain or return to baseline ADL function  Description: INTERVENTIONS:  -  Assess patient's ability to carry out ADLs; assess patient's baseline for ADL function and identify physical deficits which impact ability to perform ADLs (bathing, care of mouth/teeth, toileting, grooming, dressing, etc.)  - Assess/evaluate cause of self-care deficits   - Assess range of motion  - Assess patient's mobility; develop plan if impaired  - Assess patient's need for assistive devices and provide as appropriate  - Encourage maximum independence but intervene and supervise when necessary  - Involve family in performance of ADLs  - Assess for home care needs following discharge   - Consider OT consult to assist with ADL evaluation and planning for discharge  - Provide patient education as appropriate  Outcome: Progressing  Goal: Maintains/Returns to pre admission functional level  Description: INTERVENTIONS:  - Perform AM-PAC 6 Click Basic Mobility/ Daily Activity assessment daily.  - Set and communicate daily mobility goal to care team and patient/family/caregiver. - Collaborate with rehabilitation services on mobility goals if consulted  - Perform Range of Motion 3 times a day. - Reposition patient every 3 hours.   - Dangle patient 3 times a day  - Stand patient 3 times a day  - Ambulate patient 3 times a day  - Out of bed to chair 3 times a day   - Out of bed for meals 3 times a day  - Out of bed for toileting  - Record patient progress and toleration of activity level   Outcome: Progressing     Problem: DISCHARGE PLANNING  Goal: Discharge to home or other facility with appropriate resources  Description: INTERVENTIONS:  - Identify barriers to discharge w/patient and caregiver  - Arrange for needed discharge resources and transportation as appropriate  - Identify discharge learning needs (meds, wound care, etc.)  - Arrange for interpretive services to assist at discharge as needed  - Refer to Case Management Department for coordinating discharge planning if the patient needs post-hospital services based on physician/advanced practitioner order or complex needs related to functional status, cognitive ability, or social support system  Outcome: Progressing     Problem: Knowledge Deficit  Goal: Patient/family/caregiver demonstrates understanding of disease process, treatment plan, medications, and discharge instructions  Description: Complete learning assessment and assess knowledge base.   Interventions:  - Provide teaching at level of understanding  - Provide teaching via preferred learning methods  Outcome: Progressing

## 2023-11-21 NOTE — ASSESSMENT & PLAN NOTE
Colonoscopy that showed ileitis in the past (2019) suspicion for Crohn disease however patient did not follow-up. Has a colonoscopy and MRI enterography scheduled after was seen in the GI office recently  GI was consulted. Appreciate recommendations   Patient reported diarrhea in the past few days, that was watery and formed stool. This am patient had loose BM, no concern for C diff at this time.

## 2023-11-21 NOTE — ASSESSMENT & PLAN NOTE
Recent Labs     11/20/23  1532 11/21/23  0455   HGB 12.3 10.7*      Per GI, patient had some blood strakes in the stool ( noticed on the picture that the patient took prior to admission)  Monitor

## 2023-11-21 NOTE — UTILIZATION REVIEW
NOTIFICATION OF INPATIENT ADMISSION   AUTHORIZATION REQUEST   SERVICING FACILITY:   [address] [place] [address]  [address] [facility],Third Floor [address]  Tax ID: [id number]  NPI: [id number] ATTENDING PROVIDER:  Attending Name and NPI#: Giuliana Florian Md [[id number]]  Address: [address] [facility],Third [address] [address]  Phone: [phone]   ADMISSION INFORMATION:  Place of Service: [address] [place] [address]  Place of Service Code: 21  Inpatient Admission Date/Time: 11/20/23  5:49 PM  Discharge Date/Time: No discharge date for patient encounter. Admitting Diagnosis Code/Description:  Abdominal pain [R10.9]  Pulmonary nodule [R91.1]  Lower abdominal pain [R10.30]  Crohn's disease, colon, with abscess ([address] [facility]) [A11.688]     UTILIZATION REVIEW CONTACT:  Jackie Ledezma Utilization   Network Utilization Review Department  Phone: [phone]  Fax [phone]  Email: [email or web address]  Contact for approvals/pending authorizations, clinical reviews, and discharge. PHYSICIAN ADVISORY SERVICES:  Medical Necessity Denial & [employer] Review  Phone: [phone]  Fax: [phone]  Email: [email or web address]     DISCHARGE SUPPORT TEAM:  For Patients Discharge Needs & Updates  Phone: [phone] opt. 2 Fax: [phone]  Email: [email or web address]

## 2023-11-21 NOTE — CONSULTS
e-Consult (IPC)  - Interventional Radiology  Keith Coelho 28 y.o. male MRN: [id number]  Unit/Bed#: -01 Encounter: [id number]          Interventional Radiology has been consulted to evaluate Keith Coelho    We were consulted by Surgery concerning this patient with intra abdominal abscess. Inpatient Consult to IR  Consult performed by: Wilmer Warner MD  Consult ordered by: Tyson Denton PA-C        11/21/23    Assessment/Recommendation:   28 yr old male with history of Crohns with history of abscess on prior CT of 10/26/23 now with new CT of 11/20/23 showing increasing multiloculated collection in the mid pelvis surrounded by bowel loops anteriorly and pelvis posteriorly and not amenable for percutaneous drain placement. Imaging reviewed with Kevin Sanchez . Plan for surgical exploration. 5-10 minutes, >50% of the total time devoted to medical consultative verbal/EMR discussion between providers. Written report will be generated in the EMR. Thank you for allowing Interventional Radiology to participate in the care of Keith Coelho. Please don't hesitate to call or TigerText us with any questions.      Wilmer Warner MD

## 2023-11-21 NOTE — UTILIZATION REVIEW
NOTIFICATION OF INPATIENT ADMISSION   AUTHORIZATION REQUEST   SERVICING FACILITY:   [address] [place] [address]  [address] [facility],Third Floor [address]  Tax ID: [id number]  NPI: [id number] ATTENDING PROVIDER:  Attending Name and NPI#: Modesta Shepard Md [[id number]]  Address: [facility] [address] [facility],Third Floor [address]  Phone: [phone]   ADMISSION INFORMATION:  Place of Service: [address] [place] [address]  Place of Service Code: 21  Inpatient Admission Date/Time: 11/20/23  5:49 PM  Discharge Date/Time: No discharge date for patient encounter. Admitting Diagnosis Code/Description:  Abdominal pain [R10.9]  Pulmonary nodule [R91.1]  Lower abdominal pain [R10.30]  Crohn's disease, colon, with abscess ([address] [facility]) [V65.986]     UTILIZATION REVIEW CONTACT:  Karen Kelly Utilization   Network Utilization Review Department  Phone: [phone]  Fax [phone]  Email: [email or web address]  Contact for approvals/pending authorizations, clinical reviews, and discharge. PHYSICIAN ADVISORY SERVICES:  Medical Necessity Denial & [employer] Review  Phone: [phone]  Fax: [phone]  Email: [email or web address]     DISCHARGE SUPPORT TEAM:  For Patients Discharge Needs & Updates  Phone: [phone] opt. 2 Fax: [phone]  Email: [email or web address]

## 2023-11-21 NOTE — UTILIZATION REVIEW
Initial Clinical Review    Admission: Date/Time/Statement:   Admission Orders (From admission, onward)       Ordered        11/20/23 1749  INPATIENT ADMISSION  Once                          Orders Placed This Encounter   Procedures    INPATIENT ADMISSION     Standing Status:   Standing     Number of Occurrences:   1     Order Specific Question:   Level of Care     Answer:   Med Surg [16]     Order Specific Question:   Estimated length of stay     Answer:   More than 2 Midnights     Order Specific Question:   Certification     Answer:   I certify that inpatient services are medically necessary for this patient for a duration of greater than two midnights. See H&P and MD Progress Notes for additional information about the patient's course of treatment. ED Arrival Information       Expected   -    Arrival   11/20/2023 15:10    Acuity   Urgent              Means of arrival   Walk-In    Escorted by   Self    Service   Hospitalist    Admission type   Emergency              Arrival complaint   abd pain             Chief Complaint   Patient presents with    Abdominal Pain       Initial Presentation: 28 y.o. male w/ PMH of tobacco use and Crohn's disease with recently found abscess presented to the ED from home with worsening abdominal pain x2 days. Patient was admitted on 10/26-10/27 and was diagnosed with Crohn's with a small intestinal abscess which was nondrainable, discharged on antibiotics for course of 12 days. Reports very mild abdominal pain on discharge, however acutely worse in the last 2 days, sharp/stabbing in nature. Denies any fevers, diarrhea, bloody stools. In the ED, CT imaging showed worsening abscess. Started on IV Zosyn. On exam, abdominal tenderness present. Admit as Inpatient for evaluation and treatment of Crohn's disease, intestinal abscess. Plan: Start Zosyn. Consult Surgery for possible I&D. Consult GI. NPO at midnight.     11/20 Gen Surg Consult: CT with enlargement of multiloculated rim enhancing collection, presumed to be abscess but cannot exclude fistula. Plan: no emergent surgical intervention warranted at this time. recommend IR consult for evaluation for drainage. cont IV abx. prn pain, antiemetic regimen. dvt ppx. GI consult    Date: 11/20   Day 2:   IR Consult: CT of 11/20/23 showing increasing multiloculated collection in the mid pelvis surrounded by bowel loops anteriorly and pelvis posteriorly and not amenable for percutaneous drain placement. Plan for surgical exploration. GI Consult: patient was previously evaluated in 2019 for abdominal pain and change in bowel habits. CT scan with ileitis. Colonoscopy with ileitis. Suspected Crohn's disease was a diagnosis but that was never followed up. He has been scheduled for colonoscopy and possibly MRI enterography. Also noted possible abscess with fistulous tract to the ileum. Also reports unintentional wt loss of 11 lbs in 1-2 months. Will likely need colonoscopy to determine whether or not Crohn's is present and may require EGD as well if continued weight loss. Gen Surg: Pt reports worsening pain and continued weight loss. Continue plan for conservative management at this time, IV antibiotics, bowel rest, IVFs. If IR unable to drain collection, will need to consider diagnostic laparoscopy, drainage of abscess. Continue to trend WBC, fever curve, vital signs. Nutrition consult.   PE: abd flat, soft,  tenderness across lower abdomen without rebound or guarding, few bowel sounds     ED Triage Vitals [11/20/23 1518]   Temperature Pulse Respirations Blood Pressure SpO2   97.7 °F (36.5 °C) 86 18 141/72 99 %      Temp Source Heart Rate Source Patient Position - Orthostatic VS BP Location FiO2 (%)   Temporal Monitor Lying Right arm --      Pain Score       5          Wt Readings from Last 1 Encounters:   11/20/23 63 kg (139 lb)     Additional Vital Signs:   Date/Time Temp Pulse Resp BP MAP (mmHg) SpO2 O2 Device Patient Position - Orthostatic VS   11/21/23 07:55:13 97.5 °F (36.4 °C) 75 19 102/71 81 98 % -- --   11/21/23 0100 -- -- -- -- -- -- None (Room air) --   11/20/23 20:29:17 97.7 °F (36.5 °C) 77 -- 93/60 71 97 % -- --   11/20/23 1852 97.7 °F (36.5 °C) 86 18 116/76 -- -- -- --   11/20/23 1825 97.7 °F (36.5 °C) -- -- 116/76 -- -- -- Lying       Pertinent Labs/Diagnostic Test Results:   CT abdomen pelvis with contrast   Final Result by Suzy Burr MD (11/20 1714)   Interval enlargement of the mesenteric multiloculated rim-enhancing collection between the inflamed sigmoid colon and distal ileum. It is presumed to be abscess, but fistulous communication is difficult to completely exclude. No gas within the    collection. Concern for stricture of the affected segment of terminal and distal ileum, unchanged. No new abnormalities. Indeterminate solid pulmonary nodule measuring 6 mm. In a low-risk patient with a solid nodule of 6-8 mm, recommend CT at 6-12 months, then consider CT at 18-24 months.             Workstation performed: [id number]               Results from last 7 days   Lab Units 11/21/23 0455 11/20/23  1532   WBC Thousand/uL 11.19* 9.79   HEMOGLOBIN g/dL 10.7* 12.3   HEMATOCRIT % 33.5* 38.8   PLATELETS Thousands/uL 400* 434*   NEUTROS ABS Thousands/µL 8.04*  --          Results from last 7 days   Lab Units 11/21/23  0455 11/20/23  1532   SODIUM mmol/L 136 133*   POTASSIUM mmol/L 4.1 3.9   CHLORIDE mmol/L 104 96   CO2 mmol/L 25 29   ANION GAP mmol/L 7 8   BUN mg/dL 16 14   CREATININE mg/dL 0.92 0.89   EGFR ml/min/1.73sq m 108 111   CALCIUM mg/dL 8.7 9.4     Results from last 7 days   Lab Units 11/21/23  0455 11/20/23  1532   AST U/L 9* 12*   ALT U/L 8 10   ALK PHOS U/L 54 57   TOTAL PROTEIN g/dL 6.5 7.8   ALBUMIN g/dL 3.3* 3.8   TOTAL BILIRUBIN mg/dL 0.28 0.46         Results from last 7 days   Lab Units 11/21/23  0455 11/20/23  1532   GLUCOSE RANDOM mg/dL 137 128       Results from last 7 days   Lab Units 11/20/23  1532 LACTIC ACID mmol/L 0.7           Results from last 7 days   Lab Units 11/20/23  1532   LIPASE u/L 17                 Results from last 7 days   Lab Units 11/20/23  1555   CLARITY UA  Clear   COLOR UA  Light Yellow   SPEC GRAV UA  <1.005*   PH UA  6.0   GLUCOSE UA mg/dl Negative   KETONES UA mg/dl Negative   BLOOD UA  Negative   PROTEIN UA mg/dl Negative   NITRITE UA  Negative   BILIRUBIN UA  Negative   UROBILINOGEN UA (BE) mg/dl <2.0   LEUKOCYTES UA  Negative             Results from last 7 days   Lab Units 11/20/23  2314 11/20/23  1805   BLOOD CULTURE  Received in Microbiology Lab. Culture in Progress. Received in Microbiology Lab. Culture in Progress.          ED Treatment:   Medication Administration from 11/20/2023 1510 to 11/20/2023 1819         Date/Time Order Dose Route Action     11/20/2023 1607 EST sodium chloride 0.9 % bolus 500 mL 500 mL Intravenous New Bag     11/20/2023 1625 EST iohexol (OMNIPAQUE) 350 MG/ML injection (MULTI-DOSE) 100 mL 100 mL Intravenous Given     11/20/2023 1806 EST piperacillin-tazobactam (ZOSYN) IVPB 3.375 g 3.375 g Intravenous New Bag          Past Medical History:   Diagnosis Date    Crohn's colitis ([address])      Present on Admission:   Cigarette nicotine dependence without complication   Crohn's disease of both small and large intestine with abscess ([address])   Intestinal abscess      Admitting Diagnosis: Abdominal pain [R10.9]  Pulmonary nodule [R91.1]  Lower abdominal pain [R10.30]  Crohn's disease, colon, with abscess ([address]) [K50.114]  Age/Sex: 28 y.o. male  Admission Orders:  NPO  Scheduled Medications:  nicotine, 14 mg, Transdermal, Daily  piperacillin-tazobactam, 3.375 g, Intravenous, Q6H      Continuous IV Infusions: none     PRN Meds:  acetaminophen, 975 mg, Oral, Q8H PRN  ketorolac, 15 mg, Intravenous, Q6H PRN 11/20 x 1, 11/21 x 1        IP CONSULT TO GASTROENTEROLOGY  IP CONSULT TO ACUTE CARE SURGERY  INPATIENT CONSULT TO IR    Network Utilization Review Department  ATTENTION: Please call with any questions or concerns to [phone] and carefully listen to the prompts so that you are directed to the right person. All voicemails are confidential.   For Discharge needs, contact Care Management DC Support Team at [phone] opt. 2  Send all requests for admission clinical reviews, approved or denied determinations and any other requests to dedicated fax number below belonging to the campus where the patient is receiving treatment.  List of dedicated fax numbers for the Facilities:  [place] DENIALS (Administrative/Medical Necessity) [phone]   DISCHARGE SUPPORT TEAM (NETWORK) [address] [facility] (Maternity/NICU/Pediatrics) [phone]   [address] 1521 [facility] 1000 [facility] [phone]   [address] [place] [address] [address] [address] [facility] [address] [phone]

## 2023-11-22 PROBLEM — A49.8 CLOSTRIDIOIDES DIFFICILE INFECTION: Status: ACTIVE | Noted: 2023-11-22

## 2023-11-22 PROBLEM — A04.72 C. DIFFICILE DIARRHEA: Status: ACTIVE | Noted: 2023-11-22

## 2023-11-22 LAB
ANION GAP SERPL CALCULATED.3IONS-SCNC: 8 MMOL/L
BUN SERPL-MCNC: 6 MG/DL (ref 5–25)
C DIFF TOX A+B STL QL IA: POSITIVE
C DIFF TOX GENS STL QL NAA+PROBE: POSITIVE
CALCIUM SERPL-MCNC: 9.1 MG/DL (ref 8.4–10.2)
CAMPYLOBACTER DNA SPEC NAA+PROBE: NORMAL
CHLORIDE SERPL-SCNC: 106 MMOL/L (ref 96–108)
CO2 SERPL-SCNC: 24 MMOL/L (ref 21–32)
CREAT SERPL-MCNC: 0.84 MG/DL (ref 0.6–1.3)
ERYTHROCYTE [DISTWIDTH] IN BLOOD BY AUTOMATED COUNT: 12.8 % (ref 11.6–15.1)
GFR SERPL CREATININE-BSD FRML MDRD: 114 ML/MIN/1.73SQ M
GLUCOSE SERPL-MCNC: 110 MG/DL (ref 65–140)
HCT VFR BLD AUTO: 35.2 % (ref 36.5–49.3)
HGB BLD-MCNC: 11.4 G/DL (ref 12–17)
MCH RBC QN AUTO: 27.6 PG (ref 26.8–34.3)
MCHC RBC AUTO-ENTMCNC: 32.4 G/DL (ref 31.4–37.4)
MCV RBC AUTO: 85 FL (ref 82–98)
PLATELET # BLD AUTO: 418 THOUSANDS/UL (ref 149–390)
PMV BLD AUTO: 8.8 FL (ref 8.9–12.7)
POTASSIUM SERPL-SCNC: 4.2 MMOL/L (ref 3.5–5.3)
RBC # BLD AUTO: 4.13 MILLION/UL (ref 3.88–5.62)
SALMONELLA DNA SPEC QL NAA+PROBE: NORMAL
SHIGA TOXIN STX GENE SPEC NAA+PROBE: NORMAL
SHIGELLA DNA SPEC QL NAA+PROBE: NORMAL
SODIUM SERPL-SCNC: 138 MMOL/L (ref 135–147)
WBC # BLD AUTO: 11.03 THOUSAND/UL (ref 4.31–10.16)

## 2023-11-22 PROCEDURE — 99232 SBSQ HOSP IP/OBS MODERATE 35: CPT | Performed by: INTERNAL MEDICINE

## 2023-11-22 PROCEDURE — 85027 COMPLETE CBC AUTOMATED: CPT | Performed by: INTERNAL MEDICINE

## 2023-11-22 PROCEDURE — 80048 BASIC METABOLIC PNL TOTAL CA: CPT | Performed by: INTERNAL MEDICINE

## 2023-11-22 PROCEDURE — 99232 SBSQ HOSP IP/OBS MODERATE 35: CPT | Performed by: SURGERY

## 2023-11-22 RX ORDER — VANCOMYCIN HYDROCHLORIDE 125 MG/1
125 CAPSULE ORAL EVERY 6 HOURS SCHEDULED
Status: DISCONTINUED | OUTPATIENT
Start: 2023-11-22 | End: 2023-11-24 | Stop reason: HOSPADM

## 2023-11-22 RX ADMIN — PIPERACILLIN AND TAZOBACTAM 3.38 G: 3; .375 INJECTION, POWDER, LYOPHILIZED, FOR SOLUTION INTRAVENOUS at 18:03

## 2023-11-22 RX ADMIN — PIPERACILLIN AND TAZOBACTAM 3.38 G: 3; .375 INJECTION, POWDER, LYOPHILIZED, FOR SOLUTION INTRAVENOUS at 23:24

## 2023-11-22 RX ADMIN — VANCOMYCIN HYDROCHLORIDE 125 MG: 125 CAPSULE ORAL at 23:24

## 2023-11-22 RX ADMIN — PIPERACILLIN AND TAZOBACTAM 3.38 G: 3; .375 INJECTION, POWDER, LYOPHILIZED, FOR SOLUTION INTRAVENOUS at 00:10

## 2023-11-22 RX ADMIN — VANCOMYCIN HYDROCHLORIDE 125 MG: 125 CAPSULE ORAL at 18:03

## 2023-11-22 RX ADMIN — NICOTINE 14 MG: 14 PATCH, EXTENDED RELEASE TRANSDERMAL at 09:39

## 2023-11-22 RX ADMIN — PIPERACILLIN AND TAZOBACTAM 3.38 G: 3; .375 INJECTION, POWDER, LYOPHILIZED, FOR SOLUTION INTRAVENOUS at 05:21

## 2023-11-22 RX ADMIN — PIPERACILLIN AND TAZOBACTAM 3.38 G: 3; .375 INJECTION, POWDER, LYOPHILIZED, FOR SOLUTION INTRAVENOUS at 11:58

## 2023-11-22 NOTE — PROGRESS NOTES
Progress Note - General Surgery   Amanda Bragg 28 y.o. male MRN: [id number]  Unit/Bed#: -Reyna Encounter: [id number]    Assessment/Plan:  Intra-abdominal abscess  -Likely related to Crohn's disease  -Previous admission for similar symptoms with ileitis and adjacent abscess  -Admission CT with previous abscess now enlarged, possibly related to fistula  -Mild leukocytosis, vital signs stable, abdominal exam benign  -Abscess not amenable to IR drainage  -Discussed findings in detail with patient. Discussed operative intervention versus continued longer course of antibiotics with follow-up imaging. Discussed possible risks related to surgical intervention.  -Will plan for conservative management at this time. Recommend 2-week course of Augmentin with follow-up CT scan. If CT scan with worsening abscess will need to consider surgical intervention for drainage and exploration.  -Should follow-up in our office after repeat CT scan for further planning/intervention  -Continue GI follow-up  -Okay for discharge from surgical standpoint    Crohn's disease  -Findings likely consistent with Crohn's disease  -Continue GI work-up, patient sees [facility]  -Will need definitive maintenance therapy wants abscess fully treated     Tobacco abuse  -With incidental findings of 6 mm lung nodule, will need outpatient follow-up  -Nicotine patch ordered  -Cessation recommended, especially in setting of likely Crohn's disease     Malnutrition  -Patient with significant weight loss over the past few months with worsening abdominal symptoms  -Nutrition consult      Subjective/Objective   Chief Complaint: Abdominal pain    Subjective: Abdominal pain is significantly improved. Still discomfort with urination. Otherwise tolerating diet. Continues with intermittent diarrhea. No fevers or chills. Remains afebrile. Objective:     Blood pressure 104/68, pulse 65, temperature 97.5 °F (36.4 °C), resp.  rate 16, height 5' 8" (1.727 m), weight 63 kg (139 lb), SpO2 98 %. ,Body mass index is 21.13 kg/m². No intake or output data in the 24 hours ending 11/22/23 1048    Invasive Devices       Peripheral Intravenous Line  Duration             Peripheral IV 11/20/23 Right;Upper;Ventral (anterior) Arm 1 day                    Physical Exam:   General appearance: alert and oriented, in no acute distress  Head: Normocephalic, without obvious abnormality, atraumatic, sclerae anicteric, mucous membranes moist  Neck: no JVD and supple, symmetrical, trachea midline  Lungs: clear to auscultation, no wheezes or rales  Heart:   Regular rate and rhythm, S1-S2 normal, no murmur  Abdomen:   Flat, soft, mild discomfort over lower abdomen, no rebound or guarding, no peritoneal signs, few bowel sounds  Extremities:   No edema, redness or tenderness in the calves or thighs  Skin: Warm, dry  Nursing notes and vital signs reviewed      Lab, Imaging and other studies:I have personally reviewed pertinent lab results.   , CBC:   Lab Results   Component Value Date    WBC 11.03 (H) 11/22/2023    HGB 11.4 (L) 11/22/2023    HCT 35.2 (L) 11/22/2023    MCV 85 11/22/2023     (H) 11/22/2023    RBC 4.13 11/22/2023    MCH 27.6 11/22/2023    MCHC 32.4 11/22/2023    RDW 12.8 11/22/2023    MPV 8.8 (L) 11/22/2023   , CMP:   Lab Results   Component Value Date    SODIUM 138 11/22/2023    K 4.2 11/22/2023     11/22/2023    CO2 24 11/22/2023    BUN 6 11/22/2023    CREATININE 0.84 11/22/2023    CALCIUM 9.1 11/22/2023    EGFR 114 11/22/2023     VTE Pharmacologic Prophylaxis: Heparin  VTE Mechanical Prophylaxis: sequential compression device    Willisond Miguelangel Gleason

## 2023-11-22 NOTE — PROGRESS NOTES
[address] [facility]  Progress Note  Name: Keshawn Dominguez  MRN: [id number]  Unit/Bed#: -01 I Date of Admission: 11/20/2023   Date of Service: 11/22/2023 I Hospital Day: 2    Assessment/Plan   * Intestinal abscess  Assessment & Plan  Presents for worsening abdominal pain x 2 days, reports mild abdominal pain since discharge on 10/27/23. Repeat CT today "Interval enlargement of the mesenteric multiloculated rim-enhancing collection between the inflamed sigmoid colon and distal ileum. It is presumed to be abscess, but fistulous communication is difficult to completely exclude. No gas within the collection. Concern for stricture of the affected segment of terminal and distal ileum, unchanged."  IR consulted. Per their note, patient is not amenable to have percutaneous drainage  Spoke with surgery. Surgery recommended discharged home on Augmentin for 14 days and have a CT abdomen pelvis with and without contrast in 2 weeks. Patient tested positive for C. difficile. Prefers to stay until tomorrow. Continue zosyn  Pain management       Crohn's disease of both small and large intestine with abscess [facility])  Assessment & Plan  Colonoscopy that showed ileitis in the past (2019) suspicion for Crohn disease however patient did not follow-up. Has a colonoscopy and MRI enterography scheduled after was seen in the GI office recently  GI was consulted. Appreciate recommendations   Patient will follow-up with GI on discharge. Clostridioides difficile infection  Assessment & Plan  Tested positive for C. Difficile  Start vancomycin 125 every 6 hours.  Will need to continue for 7 days after will complete oral antibiotics     Anemia  Assessment & Plan  Recent Labs     11/20/23  1532 11/21/23  0455 11/22/23  0529   HGB 12.3 10.7* 11.4*        Per GI, patient had some blood strakes in the stool ( noticed on the picture that the patient took prior to admission)  Monitor     Lung nodule  Assessment & Plan  CT imaging "Indeterminate solid pulmonary nodule measuring 6 mm. "  Reports tobacco use x 13 years  Recommend repeat CT in 3-6 months to monitor lung nodule    Cigarette nicotine dependence without complication  Assessment & Plan  W/ lung nodule 6mm that will require follow up  Provided with nicotine patch  Pt is motivated to quit           VTE Pharmacologic Prophylaxis: VTE Score: 2 Low Risk (Score 0-2) - Encourage Ambulation. Mobility:   Basic Mobility Inpatient Raw Score: 24  JH-HLM Goal: 8: Walk 250 feet or more  JH-HLM Achieved: 8: Walk 250 feet ot more  HLM Goal achieved. Continue to encourage appropriate mobility. Patient Centered Rounds: I performed bedside rounds with nursing staff today. Discussions with Specialists or Other Care Team Provider: cm, surgery, GI    Education and Discussions with Family / Patient: Patient declined call to . Total Time Spent on Date of Encounter in care of patient: 60 mins. This time was spent on one or more of the following: performing physical exam; counseling and coordination of care; obtaining or reviewing history; documenting in the medical record; reviewing/ordering tests, medications or procedures; communicating with other healthcare professionals and discussing with patient's family/caregivers. Current Length of Stay: 2 day(s)  Current Patient Status: Inpatient   Certification Statement: The patient will continue to require additional inpatient hospital stay due to c diff, abd pain   Discharge Plan: Anticipate discharge tomorrow to home. Code Status: Level 1 - Full Code    Subjective:   Diarrhea, mild abd pain     Objective:     Vitals:   Temp (24hrs), Av.4 °F (36.3 °C), Min:97.2 °F (36.2 °C), Max:97.5 °F (36.4 °C)    Temp:  [97.2 °F (36.2 °C)-97.5 °F (36.4 °C)] 97.5 °F (36.4 °C)  HR:  [65-67] 65  Resp:  [16] 16  BP: ()/(55-68) 104/68  SpO2:  [96 %-98 %] 98 %  Body mass index is 21.13 kg/m².      Input and Output Summary (last 24 hours):   No intake or output data in the 24 hours ending 11/22/23 1505    Physical Exam:   Physical Exam  Vitals and nursing note reviewed. Constitutional:       General: He is not in acute distress. Appearance: He is not diaphoretic. HENT:      Head: Normocephalic. Eyes:      General:         Right eye: No discharge. Left eye: No discharge. Cardiovascular:      Rate and Rhythm: Normal rate and regular rhythm. Heart sounds: No murmur heard. Pulmonary:      Effort: Pulmonary effort is normal. No respiratory distress. Breath sounds: Normal breath sounds. No wheezing, rhonchi or rales. Abdominal:      General: There is no distension. Palpations: Abdomen is soft. Tenderness: There is abdominal tenderness. There is no guarding or rebound. Musculoskeletal:      Cervical back: Normal range of motion. Right lower leg: No edema. Left lower leg: No edema. Skin:     General: Skin is warm. Coloration: Skin is pale. Neurological:      Mental Status: He is alert and oriented to person, place, and time. Psychiatric:         Mood and Affect: Mood normal.         Behavior: Behavior normal.         Thought Content:  Thought content normal.         Judgment: Judgment normal.          Additional Data:     Labs:  Results from last 7 days   Lab Units 11/22/23  0529 11/21/23  0455   WBC Thousand/uL 11.03* 11.19*   HEMOGLOBIN g/dL 11.4* 10.7*   HEMATOCRIT % 35.2* 33.5*   PLATELETS Thousands/uL 418* 400*   NEUTROS PCT %  --  71   LYMPHS PCT %  --  12*   MONOS PCT %  --  11   EOS PCT %  --  4     Results from last 7 days   Lab Units 11/22/23  0529 11/21/23  0455   SODIUM mmol/L 138 136   POTASSIUM mmol/L 4.2 4.1   CHLORIDE mmol/L 106 104   CO2 mmol/L 24 25   BUN mg/dL 6 16   CREATININE mg/dL 0.84 0.92   ANION GAP mmol/L 8 7   CALCIUM mg/dL 9.1 8.7   ALBUMIN g/dL  --  3.3*   TOTAL BILIRUBIN mg/dL  --  0.28   ALK PHOS U/L  --  54   ALT U/L  --  8 AST U/L  --  9*   GLUCOSE RANDOM mg/dL 110 137                 Results from last 7 days   Lab Units 11/20/23  1532   LACTIC ACID mmol/L 0.7       Lines/Drains:  Invasive Devices       Peripheral Intravenous Line  Duration             Peripheral IV 11/22/23 Left Antecubital <1 day                          Imaging: No pertinent imaging reviewed. Recent Cultures (last 7 days):   Results from last 7 days   Lab Units 11/21/23  1242 11/20/23  2314 11/20/23  1805   BLOOD CULTURE   --  No Growth at 24 hrs. No Growth at 24 hrs. C DIFF TOXIN B BY PCR  Positive*  --   --        Last 24 Hours Medication List:   Current Facility-Administered Medications   Medication Dose Route Frequency Provider Last Rate    acetaminophen  975 mg Oral Q8H PRN Carolann Yan PA-C      ketorolac  15 mg Intravenous Q6H PRN Carolann Yan PA-C      nicotine  14 mg Transdermal Daily Stefan Barreto PA-C      piperacillin-tazobactam  3.375 g Intravenous Q6H Stefan Barreto PA-C 3.375 g (11/22/23 1158)    vancomycin oral (capsules or solution)  125 mg Oral Q6H Jazz Reed MD          Today, Patient Was Seen By: Silverio Gomes MD    **Please Note: This note may have been constructed using a voice recognition system. **

## 2023-11-22 NOTE — PLAN OF CARE
Problem: INFECTION - ADULT  Goal: Absence or prevention of progression during hospitalization  Description: INTERVENTIONS:  - Assess and monitor for signs and symptoms of infection  - Monitor lab/diagnostic results  - Monitor all insertion sites, i.e. indwelling lines, tubes, and drains  - Monitor endotracheal if appropriate and nasal secretions for changes in amount and color  - [place] appropriate cooling/warming therapies per order  - Administer medications as ordered  - Instruct and encourage patient and family to use good hand hygiene technique  - Identify and instruct in appropriate isolation precautions for identified infection/condition  Outcome: Progressing     Problem: PAIN - ADULT  Goal: Verbalizes/displays adequate comfort level or baseline comfort level  Description: Interventions:  - Encourage patient to monitor pain and request assistance  - Assess pain using appropriate pain scale  - Administer analgesics based on type and severity of pain and evaluate response  - Implement non-pharmacological measures as appropriate and evaluate response  - Consider cultural and social influences on pain and pain management  - Notify physician/advanced practitioner if interventions unsuccessful or patient reports new pain  Outcome: Progressing     Problem: SAFETY ADULT  Goal: Patient will remain free of falls  Description: INTERVENTIONS:  - Educate patient/family on patient safety including physical limitations  - Instruct patient to call for assistance with activity   - Consult OT/PT to assist with strengthening/mobility   - Keep Call bell within reach  - Keep bed low and locked with side rails adjusted as appropriate  - Keep care items and personal belongings within reach  - Initiate and maintain comfort rounds  - Make Fall Risk Sign visible to staff  - Offer Toileting every 2 Hours, in advance of need  - Initiate/Maintain alarm  - Obtain necessary fall risk management equipment:   - Apply yellow socks and bracelet for high fall risk patients  - Consider moving patient to room near nurses station  Outcome: Progressing

## 2023-11-22 NOTE — ASSESSMENT & PLAN NOTE
Colonoscopy that showed ileitis in the past (2019) suspicion for Crohn disease however patient did not follow-up. Has a colonoscopy and MRI enterography scheduled after was seen in the GI office recently  GI was consulted. Appreciate recommendations   Patient will follow-up with GI on discharge.

## 2023-11-22 NOTE — ASSESSMENT & PLAN NOTE
Recent Labs     11/20/23  1532 11/21/23  0455 11/22/23  0529   HGB 12.3 10.7* 11.4*        Per GI, patient had some blood strakes in the stool ( noticed on the picture that the patient took prior to admission)  Monitor

## 2023-11-22 NOTE — ASSESSMENT & PLAN NOTE
Presents for worsening abdominal pain x 2 days, reports mild abdominal pain since discharge on 10/27/23. Repeat CT today "Interval enlargement of the mesenteric multiloculated rim-enhancing collection between the inflamed sigmoid colon and distal ileum. It is presumed to be abscess, but fistulous communication is difficult to completely exclude. No gas within the collection. Concern for stricture of the affected segment of terminal and distal ileum, unchanged."  IR consulted. Per their note, patient is not amenable to have percutaneous drainage  Spoke with surgery. Surgery recommended discharged home on Augmentin for 14 days and have a CT abdomen pelvis with and without contrast in 2 weeks. Patient tested positive for C. difficile. Prefers to stay until tomorrow.   Continue zosyn  Pain management

## 2023-11-22 NOTE — ASSESSMENT & PLAN NOTE
Tested positive for C. Difficile  Start vancomycin 125 every 6 hours.  Will need to continue for 7 days after will complete oral antibiotics

## 2023-11-22 NOTE — PLAN OF CARE
Problem: PAIN - ADULT  Goal: Verbalizes/displays adequate comfort level or baseline comfort level  Description: Interventions:  - Encourage patient to monitor pain and request assistance  - Assess pain using appropriate pain scale  - Administer analgesics based on type and severity of pain and evaluate response  - Implement non-pharmacological measures as appropriate and evaluate response  - Consider cultural and social influences on pain and pain management  - Notify physician/advanced practitioner if interventions unsuccessful or patient reports new pain  Outcome: Progressing     Problem: INFECTION - ADULT  Goal: Absence or prevention of progression during hospitalization  Description: INTERVENTIONS:  - Assess and monitor for signs and symptoms of infection  - Monitor lab/diagnostic results  - Monitor all insertion sites, i.e. indwelling lines, tubes, and drains  - Monitor endotracheal if appropriate and nasal secretions for changes in amount and color  - [place] appropriate cooling/warming therapies per order  - Administer medications as ordered  - Instruct and encourage patient and family to use good hand hygiene technique  - Identify and instruct in appropriate isolation precautions for identified infection/condition  Outcome: Progressing     Problem: SAFETY ADULT  Goal: Patient will remain free of falls  Description: INTERVENTIONS:  - Educate patient/family on patient safety including physical limitations  - Instruct patient to call for assistance with activity   - Consult OT/PT to assist with strengthening/mobility   - Keep Call bell within reach  - Keep bed low and locked with side rails adjusted as appropriate  - Keep care items and personal belongings within reach  - Initiate and maintain comfort rounds  - Make Fall Risk Sign visible to staff  - Obtain necessary fall risk management equipment: non-slip socks  - Apply yellow socks and bracelet for high fall risk patients  - Consider moving patient to room near nurses station  Outcome: Progressing  Goal: Maintain or return to baseline ADL function  Description: INTERVENTIONS:  -  Assess patient's ability to carry out ADLs; assess patient's baseline for ADL function and identify physical deficits which impact ability to perform ADLs (bathing, care of mouth/teeth, toileting, grooming, dressing, etc.)  - Assess/evaluate cause of self-care deficits   - Assess range of motion  - Assess patient's mobility; develop plan if impaired  - Assess patient's need for assistive devices and provide as appropriate  - Encourage maximum independence but intervene and supervise when necessary  - Involve family in performance of ADLs  - Assess for home care needs following discharge   - Consider OT consult to assist with ADL evaluation and planning for discharge  - Provide patient education as appropriate  Outcome: Progressing  Goal: Maintains/Returns to pre admission functional level  Description: INTERVENTIONS:  - Perform AM-PAC 6 Click Basic Mobility/ Daily Activity assessment daily.  - Set and communicate daily mobility goal to care team and patient/family/caregiver. - Collaborate with rehabilitation services on mobility goals if consulted  - Perform Range of Motion 3 times a day. - Encourage patient to reposition every 2 hours.   - Ambulate patient 3 times a day  - Out of bed to chair 3 times a day for meals  - Out of bed for toileting  - Record patient progress and toleration of activity level   Outcome: Progressing     Problem: DISCHARGE PLANNING  Goal: Discharge to home or other facility with appropriate resources  Description: INTERVENTIONS:  - Identify barriers to discharge w/patient and caregiver  - Arrange for needed discharge resources and transportation as appropriate  - Identify discharge learning needs (meds, wound care, etc.)  - Arrange for interpretive services to assist at discharge as needed  - Refer to Case Management Department for coordinating discharge planning if the patient needs post-hospital services based on physician/advanced practitioner order or complex needs related to functional status, cognitive ability, or social support system  Outcome: Progressing     Problem: Knowledge Deficit  Goal: Patient/family/caregiver demonstrates understanding of disease process, treatment plan, medications, and discharge instructions  Description: Complete learning assessment and assess knowledge base. Interventions:  - Provide teaching at level of understanding  - Provide teaching via preferred learning methods  Outcome: Progressing     Problem: Nutrition/Hydration-ADULT  Goal: Nutrient/Hydration intake appropriate for improving, restoring or maintaining nutritional needs  Description: Monitor and assess patient's nutrition/hydration status for malnutrition. Collaborate with interdisciplinary team and initiate plan and interventions as ordered. Monitor patient's weight and dietary intake as ordered or per policy. Utilize nutrition screening tool and intervene as necessary. Determine patient's food preferences and provide high-protein, high-caloric foods as appropriate.      INTERVENTIONS:  - Monitor oral intake, urinary output, labs, and treatment plans  - Assess nutrition and hydration status and recommend course of action  - Evaluate amount of meals eaten  - Assist patient with eating if necessary   - Allow adequate time for meals  - Recommend/ encourage appropriate diets, oral nutritional supplements, and vitamin/mineral supplements  - Order, calculate, and assess calorie counts as needed  - Recommend, monitor, and adjust tube feedings and TPN/PPN based on assessed needs  - Assess need for intravenous fluids  - Provide specific nutrition/hydration education as appropriate  - Include patient/family/caregiver in decisions related to nutrition  Outcome: Progressing     Problem: GASTROINTESTINAL - ADULT  Goal: Maintains or returns to baseline bowel function  Description: INTERVENTIONS:  - Assess bowel function  - Encourage oral fluids to ensure adequate hydration  - Administer IV fluids if ordered to ensure adequate hydration  - Administer ordered medications as needed  - Encourage mobilization and activity  - Consider nutritional services referral to assist patient with adequate nutrition and appropriate food choices  Outcome: Progressing  Goal: Maintains adequate nutritional intake  Description: INTERVENTIONS:  - Monitor percentage of each meal consumed  - Identify factors contributing to decreased intake, treat as appropriate  - Assist with meals as needed  - Monitor I&O, weight, and lab values if indicated  - Obtain nutrition services referral as needed  Outcome: Progressing

## 2023-11-22 NOTE — PROGRESS NOTES
Progress note - Gastroenterology   [facility] Vaughn Hurtado 28 y.o. male MRN: [id number]  Unit/Bed#: -[facility] Encounter: [id number]    ASSESSMENT and PLAN    28y.o. year old male with past medical history of tobacco use and possible Crohn's disease with recent finding of abscess between the inflamed sigmoid colon and the distal ileum. 1.  Intestinal abscess  Patient had presented to the emergency room with worsening abdominal pain over the past 2 days. On exam patient does exhibit right lower quadrant abdominal discomfort. Patient had recently been discharged after 2-day hospital stay on 10/27. CT 11/20; "Interval enlargement of the mesenteric multiloculated rim-enhancing collection between the inflamed sigmoid colon and distal ileum. It is presumed to be abscess, but fistulous communication is difficult to completely exclude. No gas within the collection. Concern for stricture of the affected segment of terminal and distal ileum, unchanged. "      -Surgery consulted; discussed operative intervention versus continued longer course of antibiotics with follow-up imaging. Patient agreed to conservative management at this time. Recommended 2-week course of Augmentin with follow-up CT. If CT scan with worsening abscess will need to consider surgical intervention for draining and explanation. -IR consulted for possible abscess drain placement. Not amenable for percutaneous drain placement."      -IV Zosyn  -Advance diet as tolerated     Discussed patient with hospitalist, patient stool sample came back today positive for C. difficile PCR and EIA. Will keep patient overnight and initiate treatment with oral vancomycin. 2.  Crohn's disease  Per patient's office visit note 11/1, patient was previously evaluated in 2019 for abdominal pain and change in bowel habits. CT scan with ileitis. Colonoscopy with ileitis. Suspected Crohn's disease was a diagnosis but that was never followed up.   Patient has been scheduled for colonoscopy and possibly MRI enterography. Also noted possible abscess with fistulous tract to the ileum. MRI currently scheduled for 12/20. 3.  Nicotine dependence without complication  Nicotine patch ordered. 4.  Unintentional weight loss. Patient states he has had approximately 11 pound weight loss in the past 1 to 2 months. He does state he has had a decreased appetite since his symptoms have began however states this is the lightest weight he has been in a long time. Patient will be scheduled as outpatient colonoscopy once he stabilizes with current abdominal concerns and recent C. difficile diagnosis. Chief Complaint   Patient presents with    Abdominal Pain       SUBJECTIVE/HPI   Patient states he is tolerating his meals well. States he had loose bowel movement again today. Denies nausea or vomiting. Dr. Sami Gunderson reviewed treatment plan with patient and he is agreeable to conservative antibiotic treatment and follow-up with both GI and surgery.     /68   Pulse 65   Temp 97.5 °F (36.4 °C)   Resp 16   Ht 5' 8" (1.727 m)   Wt 63 kg (139 lb)   SpO2 98%   BMI 21.13 kg/m²     PHYSICALEXAM  General appearance: alert, appears stated age and cooperative  Eyes: PERLLA, EOMI, no icterus   Head: Normocephalic, without obvious abnormality, atraumatic  Lungs: clear to auscultation bilaterally  Heart: regular rate and rhythm, S1, S2 normal, no murmur, click, rub or gallop  Abdomen: soft, right lower quadrant tenderness with palpation; bowel sounds normal; no masses,  no organomegaly  Extremities: extremities normal, atraumatic, no cyanosis or edema  Neurologic: Grossly normal    Lab Results   Component Value Date    CALCIUM 9.1 11/22/2023    K 4.2 11/22/2023    CO2 24 11/22/2023     11/22/2023    BUN 6 11/22/2023    CREATININE 0.84 11/22/2023     Lab Results   Component Value Date    WBC 11.03 (H) 11/22/2023    HGB 11.4 (L) 11/22/2023    HCT 35.2 (L) 11/22/2023 MCV 85 11/22/2023     (H) 11/22/2023     Lab Results   Component Value Date    ALT 8 11/21/2023    AST 9 (L) 11/21/2023    ALKPHOS 54 11/21/2023     No results found for: "AMYLASE"  Lab Results   Component Value Date    LIPASE 17 11/20/2023     No results found for: "IRON", "TIBC", "FERRITIN"  No results found for: "INR"

## 2023-11-23 LAB
ANION GAP SERPL CALCULATED.3IONS-SCNC: 6 MMOL/L
BUN SERPL-MCNC: 8 MG/DL (ref 5–25)
CALCIUM SERPL-MCNC: 8.9 MG/DL (ref 8.4–10.2)
CHLORIDE SERPL-SCNC: 105 MMOL/L (ref 96–108)
CO2 SERPL-SCNC: 25 MMOL/L (ref 21–32)
CREAT SERPL-MCNC: 0.74 MG/DL (ref 0.6–1.3)
ERYTHROCYTE [DISTWIDTH] IN BLOOD BY AUTOMATED COUNT: 12.6 % (ref 11.6–15.1)
GFR SERPL CREATININE-BSD FRML MDRD: 120 ML/MIN/1.73SQ M
GLUCOSE SERPL-MCNC: 103 MG/DL (ref 65–140)
HCT VFR BLD AUTO: 35.7 % (ref 36.5–49.3)
HGB BLD-MCNC: 11.4 G/DL (ref 12–17)
MCH RBC QN AUTO: 26.9 PG (ref 26.8–34.3)
MCHC RBC AUTO-ENTMCNC: 31.9 G/DL (ref 31.4–37.4)
MCV RBC AUTO: 84 FL (ref 82–98)
PLATELET # BLD AUTO: 414 THOUSANDS/UL (ref 149–390)
PMV BLD AUTO: 8.6 FL (ref 8.9–12.7)
POTASSIUM SERPL-SCNC: 4.3 MMOL/L (ref 3.5–5.3)
RBC # BLD AUTO: 4.24 MILLION/UL (ref 3.88–5.62)
SODIUM SERPL-SCNC: 136 MMOL/L (ref 135–147)
WBC # BLD AUTO: 12.99 THOUSAND/UL (ref 4.31–10.16)

## 2023-11-23 PROCEDURE — 85027 COMPLETE CBC AUTOMATED: CPT | Performed by: INTERNAL MEDICINE

## 2023-11-23 PROCEDURE — 80048 BASIC METABOLIC PNL TOTAL CA: CPT | Performed by: INTERNAL MEDICINE

## 2023-11-23 PROCEDURE — 99232 SBSQ HOSP IP/OBS MODERATE 35: CPT | Performed by: INTERNAL MEDICINE

## 2023-11-23 RX ORDER — AMOXICILLIN AND CLAVULANATE POTASSIUM 875; 125 MG/1; MG/1
1 TABLET, FILM COATED ORAL EVERY 12 HOURS SCHEDULED
Status: DISCONTINUED | OUTPATIENT
Start: 2023-11-23 | End: 2023-11-24 | Stop reason: HOSPADM

## 2023-11-23 RX ORDER — VANCOMYCIN HYDROCHLORIDE 125 MG/1
125 CAPSULE ORAL EVERY 6 HOURS SCHEDULED
Qty: 84 CAPSULE | Refills: 0 | Status: SHIPPED | OUTPATIENT
Start: 2023-11-23 | End: 2023-11-24 | Stop reason: SDUPTHER

## 2023-11-23 RX ORDER — AMOXICILLIN AND CLAVULANATE POTASSIUM 875; 125 MG/1; MG/1
1 TABLET, FILM COATED ORAL EVERY 12 HOURS SCHEDULED
Qty: 28 TABLET | Refills: 0 | Status: SHIPPED | OUTPATIENT
Start: 2023-11-23 | End: 2023-11-24 | Stop reason: SDUPTHER

## 2023-11-23 RX ORDER — VANCOMYCIN HYDROCHLORIDE 125 MG/1
125 CAPSULE ORAL EVERY 6 HOURS SCHEDULED
Qty: 84 CAPSULE | Refills: 0 | Status: CANCELLED | OUTPATIENT
Start: 2023-11-23 | End: 2023-12-14

## 2023-11-23 RX ADMIN — VANCOMYCIN HYDROCHLORIDE 125 MG: 125 CAPSULE ORAL at 17:27

## 2023-11-23 RX ADMIN — AMOXICILLIN AND CLAVULANATE POTASSIUM 1 TABLET: 875; 125 TABLET, COATED ORAL at 13:49

## 2023-11-23 RX ADMIN — NICOTINE 14 MG: 14 PATCH, EXTENDED RELEASE TRANSDERMAL at 09:42

## 2023-11-23 RX ADMIN — VANCOMYCIN HYDROCHLORIDE 125 MG: 125 CAPSULE ORAL at 05:01

## 2023-11-23 RX ADMIN — VANCOMYCIN HYDROCHLORIDE 125 MG: 125 CAPSULE ORAL at 23:54

## 2023-11-23 RX ADMIN — PIPERACILLIN AND TAZOBACTAM 3.38 G: 3; .375 INJECTION, POWDER, LYOPHILIZED, FOR SOLUTION INTRAVENOUS at 13:00

## 2023-11-23 RX ADMIN — VANCOMYCIN HYDROCHLORIDE 125 MG: 125 CAPSULE ORAL at 13:00

## 2023-11-23 RX ADMIN — PIPERACILLIN AND TAZOBACTAM 3.38 G: 3; .375 INJECTION, POWDER, LYOPHILIZED, FOR SOLUTION INTRAVENOUS at 05:01

## 2023-11-23 NOTE — PLAN OF CARE
Problem: INFECTION - ADULT  Goal: Absence or prevention of progression during hospitalization  Description: INTERVENTIONS:  - Assess and monitor for signs and symptoms of infection  - Monitor lab/diagnostic results  - Monitor all insertion sites, i.e. indwelling lines, tubes, and drains  - Monitor endotracheal if appropriate and nasal secretions for changes in amount and color  - [place] appropriate cooling/warming therapies per order  - Administer medications as ordered  - Instruct and encourage patient and family to use good hand hygiene technique  - Identify and instruct in appropriate isolation precautions for identified infection/condition  Outcome: Progressing     Problem: DISCHARGE PLANNING  Goal: Discharge to home or other facility with appropriate resources  Description: INTERVENTIONS:  - Identify barriers to discharge w/patient and caregiver  - Arrange for needed discharge resources and transportation as appropriate  - Identify discharge learning needs (meds, wound care, etc.)  - Arrange for interpretive services to assist at discharge as needed  - Refer to Case Management Department for coordinating discharge planning if the patient needs post-hospital services based on physician/advanced practitioner order or complex needs related to functional status, cognitive ability, or social support system  Outcome: Progressing     Problem: Knowledge Deficit  Goal: Patient/family/caregiver demonstrates understanding of disease process, treatment plan, medications, and discharge instructions  Description: Complete learning assessment and assess knowledge base.   Interventions:  - Provide teaching at level of understanding  - Provide teaching via preferred learning methods  Outcome: Progressing

## 2023-11-23 NOTE — ASSESSMENT & PLAN NOTE
Presents for worsening abdominal pain x 2 days, reports mild abdominal pain since discharge on 10/27/23. Repeat CT today "Interval enlargement of the mesenteric multiloculated rim-enhancing collection between the inflamed sigmoid colon and distal ileum. It is presumed to be abscess, but fistulous communication is difficult to completely exclude. No gas within the collection. Concern for stricture of the affected segment of terminal and distal ileum, unchanged."  IR consulted. Per their note, patient is not amenable to have percutaneous drainage  Spoke with surgery. Surgery recommended discharged home on Augmentin for 14 days and have a CT abdomen pelvis with and without contrast in 2 weeks. Patient tested positive for C. difficile. Continue Zosyn.   Start Augmentin  Pain management

## 2023-11-23 NOTE — ASSESSMENT & PLAN NOTE
Recent Labs     11/21/23  0455 11/22/23  0529 11/23/23  0455   HGB 10.7* 11.4* 11.4*        Per GI, patient had some blood strakes in the stool ( noticed on the picture that the patient took prior to admission)  Monitor

## 2023-11-23 NOTE — PROGRESS NOTES
[facility]  Progress Note  Name: Guanakito Tapia  MRN: [id number]  Unit/Bed#: -01 I Date of Admission: 11/20/2023   Date of Service: 11/23/2023 I Hospital Day: 3    Assessment/Plan   * Intestinal abscess  Assessment & Plan  Presents for worsening abdominal pain x 2 days, reports mild abdominal pain since discharge on 10/27/23. Repeat CT today "Interval enlargement of the mesenteric multiloculated rim-enhancing collection between the inflamed sigmoid colon and distal ileum. It is presumed to be abscess, but fistulous communication is difficult to completely exclude. No gas within the collection. Concern for stricture of the affected segment of terminal and distal ileum, unchanged."  IR consulted. Per their note, patient is not amenable to have percutaneous drainage  Spoke with surgery. Surgery recommended discharged home on Augmentin for 14 days and have a CT abdomen pelvis with and without contrast in 2 weeks. Patient tested positive for C. difficile. Continue Zosyn. Start Augmentin  Pain management       Crohn's disease of both small and large intestine with abscess [facility])  Assessment & Plan  Colonoscopy that showed ileitis in the past (2019) suspicion for Crohn disease however patient did not follow-up. Has a colonoscopy and MRI enterography scheduled after was seen in the GI office recently  GI was consulted. Appreciate recommendations   Patient will follow-up with GI on discharge. Clostridioides difficile infection  Assessment & Plan  Patient was on antibiotics last month. Tested positive for C. Difficile on 11/22 (PCR and EIA toxin)  Started vancomycin 125 every 6 hours. Will need to continue for 7 days after will complete oral antibiotics   Patient stable for discharge however oral vancomycin will cost him over $2000 and patient cannot afford this. Given the holiday today, CM not able to help with insurance.       Anemia  Assessment & Plan  Recent Labs     23  0455 23  0529 23  0455   HGB 10.7* 11.4* 11.4*        Per GI, patient had some blood strakes in the stool ( noticed on the picture that the patient took prior to admission)  Monitor     Lung nodule  Assessment & Plan  CT imaging "Indeterminate solid pulmonary nodule measuring 6 mm. "  Reports tobacco use x 13 years  Recommend repeat CT in 3-6 months to monitor lung nodule    Cigarette nicotine dependence without complication  Assessment & Plan  W/ lung nodule 6mm that will require follow up  Provided with nicotine patch  Pt is motivated to quit           VTE Pharmacologic Prophylaxis: VTE Score: 2 Low Risk (Score 0-2) - Encourage Ambulation. Mobility:   Basic Mobility Inpatient Raw Score: 24  JH-HLM Goal: 8: Walk 250 feet or more  JH-HLM Achieved: 8: Walk 250 feet ot more  HLM Goal achieved. Continue to encourage appropriate mobility. Patient Centered Rounds: I performed bedside rounds with nursing staff today. Discussions with Specialists or Other Care Team Provider: CM, GI    Education and Discussions with Family / Patient: Patient declined call to . Total Time Spent on Date of Encounter in care of patient: 50 mins. This time was spent on one or more of the following: performing physical exam; counseling and coordination of care; obtaining or reviewing history; documenting in the medical record; reviewing/ordering tests, medications or procedures; communicating with other healthcare professionals and discussing with patient's family/caregivers. Current Length of Stay: 3 day(s)  Current Patient Status: Inpatient   Certification Statement: The patient will continue to require additional inpatient hospital stay due to C DIFF   Discharge Plan: Anticipate discharge tomorrow to home. Code Status: Level 1 - Full Code    Subjective:   Abdominal pain improved still having diarrhea slightly better.     Objective:     Vitals:   Temp (24hrs), Av.6 °F (36.4 °C), Min:97.3 °F (36.3 °C), Max:97.9 °F (36.6 °C)    Temp:  [97.3 °F (36.3 °C)-97.9 °F (36.6 °C)] 97.5 °F (36.4 °C)  HR:  [] 68  Resp:  [16] 16  BP: ()/(58-80) 104/65  SpO2:  [98 %-99 %] 98 %  Body mass index is 21.13 kg/m². Input and Output Summary (last 24 hours): Intake/Output Summary (Last 24 hours) at 11/23/2023 1322  Last data filed at 11/23/2023 0950  Gross per 24 hour   Intake 180 ml   Output --   Net 180 ml       Physical Exam:   Physical Exam  Vitals and nursing note reviewed. Constitutional:       General: He is not in acute distress. Appearance: He is not diaphoretic. HENT:      Head: Normocephalic. Eyes:      General: No scleral icterus. Right eye: No discharge. Left eye: No discharge. Cardiovascular:      Rate and Rhythm: Normal rate and regular rhythm. Pulmonary:      Effort: Pulmonary effort is normal. No respiratory distress. Breath sounds: Normal breath sounds. No wheezing, rhonchi or rales. Abdominal:      General: There is no distension. Palpations: Abdomen is soft. Tenderness: There is no abdominal tenderness. There is no guarding or rebound. Musculoskeletal:      Cervical back: Normal range of motion. Right lower leg: No edema. Left lower leg: No edema. Skin:     General: Skin is warm. Neurological:      Mental Status: He is alert and oriented to person, place, and time. Psychiatric:         Mood and Affect: Mood normal.         Behavior: Behavior normal.         Thought Content:  Thought content normal.         Judgment: Judgment normal.          Additional Data:     Labs:  Results from last 7 days   Lab Units 11/23/23  0455 11/22/23  0529 11/21/23  0455   WBC Thousand/uL 12.99*   < > 11.19*   HEMOGLOBIN g/dL 11.4*   < > 10.7*   HEMATOCRIT % 35.7*   < > 33.5*   PLATELETS Thousands/uL 414*   < > 400*   NEUTROS PCT %  --   --  71   LYMPHS PCT %  --   --  12*   MONOS PCT %  --   --  11   EOS PCT %  --   --  4    < > = values in this interval not displayed. Results from last 7 days   Lab Units 11/23/23  0455 11/22/23  0529 11/21/23  0455   SODIUM mmol/L 136   < > 136   POTASSIUM mmol/L 4.3   < > 4.1   CHLORIDE mmol/L 105   < > 104   CO2 mmol/L 25   < > 25   BUN mg/dL 8   < > 16   CREATININE mg/dL 0.74   < > 0.92   ANION GAP mmol/L 6   < > 7   CALCIUM mg/dL 8.9   < > 8.7   ALBUMIN g/dL  --   --  3.3*   TOTAL BILIRUBIN mg/dL  --   --  0.28   ALK PHOS U/L  --   --  54   ALT U/L  --   --  8   AST U/L  --   --  9*   GLUCOSE RANDOM mg/dL 103   < > 137    < > = values in this interval not displayed. Results from last 7 days   Lab Units 11/20/23  1532   LACTIC ACID mmol/L 0.7       Lines/Drains:  Invasive Devices       Peripheral Intravenous Line  Duration             Peripheral IV 11/22/23 Left Antecubital 1 day                          Imaging: No pertinent imaging reviewed. Recent Cultures (last 7 days):   Results from last 7 days   Lab Units 11/21/23  1242 11/20/23  2314 11/20/23  1805   BLOOD CULTURE   --  No Growth at 48 hrs. No Growth at 48 hrs. C DIFF TOXIN B BY PCR  Positive*  --   --        Last 24 Hours Medication List:   Current Facility-Administered Medications   Medication Dose Route Frequency Provider Last Rate    acetaminophen  975 mg Oral Q8H PRN Daysi Ramos PA-C      amoxicillin-clavulanate  1 tablet Oral Q12H Vik Hanson MD      nicotine  14 mg Transdermal Daily Daysi Ramos PA-C      vancomycin oral (capsules or solution)  125 mg Oral Q6H Vik Hanson MD          Today, Patient Was Seen By: Jn Pedro MD    **Please Note: This note may have been constructed using a voice recognition system. **

## 2023-11-23 NOTE — ASSESSMENT & PLAN NOTE
Patient was on antibiotics last month. Tested positive for C. Difficile on 11/22 (PCR and EIA toxin)  Started vancomycin 125 every 6 hours. Will need to continue for 7 days after will complete oral antibiotics   Patient stable for discharge however oral vancomycin will cost him over $2000 and patient cannot afford this. Given the holiday today, CM not able to help with insurance.   Will need to stay until tomorrow

## 2023-11-24 VITALS
DIASTOLIC BLOOD PRESSURE: 67 MMHG | SYSTOLIC BLOOD PRESSURE: 110 MMHG | OXYGEN SATURATION: 95 % | WEIGHT: 139 LBS | RESPIRATION RATE: 18 BRPM | HEART RATE: 77 BPM | HEIGHT: 68 IN | BODY MASS INDEX: 21.07 KG/M2 | TEMPERATURE: 97.7 F

## 2023-11-24 LAB
ANION GAP SERPL CALCULATED.3IONS-SCNC: 8 MMOL/L
BUN SERPL-MCNC: 13 MG/DL (ref 5–25)
CALCIUM SERPL-MCNC: 9.4 MG/DL (ref 8.4–10.2)
CHLORIDE SERPL-SCNC: 102 MMOL/L (ref 96–108)
CO2 SERPL-SCNC: 27 MMOL/L (ref 21–32)
CREAT SERPL-MCNC: 0.72 MG/DL (ref 0.6–1.3)
ERYTHROCYTE [DISTWIDTH] IN BLOOD BY AUTOMATED COUNT: 12.6 % (ref 11.6–15.1)
GFR SERPL CREATININE-BSD FRML MDRD: 121 ML/MIN/1.73SQ M
GLUCOSE SERPL-MCNC: 105 MG/DL (ref 65–140)
HCT VFR BLD AUTO: 37.5 % (ref 36.5–49.3)
HGB BLD-MCNC: 11.9 G/DL (ref 12–17)
MCH RBC QN AUTO: 26.7 PG (ref 26.8–34.3)
MCHC RBC AUTO-ENTMCNC: 31.7 G/DL (ref 31.4–37.4)
MCV RBC AUTO: 84 FL (ref 82–98)
PLATELET # BLD AUTO: 452 THOUSANDS/UL (ref 149–390)
PMV BLD AUTO: 8.5 FL (ref 8.9–12.7)
POTASSIUM SERPL-SCNC: 4.2 MMOL/L (ref 3.5–5.3)
RBC # BLD AUTO: 4.45 MILLION/UL (ref 3.88–5.62)
SODIUM SERPL-SCNC: 137 MMOL/L (ref 135–147)
WBC # BLD AUTO: 8.23 THOUSAND/UL (ref 4.31–10.16)

## 2023-11-24 PROCEDURE — 80048 BASIC METABOLIC PNL TOTAL CA: CPT | Performed by: INTERNAL MEDICINE

## 2023-11-24 PROCEDURE — 99239 HOSP IP/OBS DSCHRG MGMT >30: CPT | Performed by: INTERNAL MEDICINE

## 2023-11-24 PROCEDURE — 85027 COMPLETE CBC AUTOMATED: CPT | Performed by: INTERNAL MEDICINE

## 2023-11-24 RX ORDER — VANCOMYCIN HYDROCHLORIDE 125 MG/1
125 CAPSULE ORAL EVERY 6 HOURS SCHEDULED
Qty: 60 CAPSULE | Refills: 0 | Status: SHIPPED | OUTPATIENT
Start: 2023-11-24 | End: 2023-12-09

## 2023-11-24 RX ORDER — AMOXICILLIN AND CLAVULANATE POTASSIUM 875; 125 MG/1; MG/1
1 TABLET, FILM COATED ORAL EVERY 12 HOURS SCHEDULED
Qty: 24 TABLET | Refills: 0 | Status: SHIPPED | OUTPATIENT
Start: 2023-11-24 | End: 2023-12-06

## 2023-11-24 RX ADMIN — AMOXICILLIN AND CLAVULANATE POTASSIUM 1 TABLET: 875; 125 TABLET, COATED ORAL at 08:41

## 2023-11-24 RX ADMIN — NICOTINE 14 MG: 14 PATCH, EXTENDED RELEASE TRANSDERMAL at 08:44

## 2023-11-24 RX ADMIN — VANCOMYCIN HYDROCHLORIDE 125 MG: 125 CAPSULE ORAL at 05:31

## 2023-11-24 NOTE — DISCHARGE SUMMARY
[address]  Discharge- Alejandro Prolamont 1990, 28 y.o. male MRN: [id number]  Unit/Bed#: [facility] [place] Encounter: [id number]  Primary Care Provider: No primary care provider on file. Date and time admitted to hospital: 11/20/2023  3:13 PM    * Intestinal abscess  Assessment & Plan  Presents for worsening abdominal pain x 2 days, reports mild abdominal pain since discharge on 10/27/23. Repeat CT today "Interval enlargement of the mesenteric multiloculated rim-enhancing collection between the inflamed sigmoid colon and distal ileum. It is presumed to be abscess, but fistulous communication is difficult to completely exclude. No gas within the collection. Concern for stricture of the affected segment of terminal and distal ileum, unchanged."  IR consulted. Per their note, patient is not amenable to have percutaneous drainage  Spoke with surgery. Surgery recommended discharged home on Augmentin for 14 days and have a CT abdomen pelvis with and without contrast in 2 weeks. Patient tested positive for C. difficile. Continue Augmentin to complete 14 days   CT abdomen pelvis without without contrast ordered. Will follow-up with surgery in 2 weeks      Crohn's disease of both small and large intestine with abscess [facility])  Assessment & Plan  Colonoscopy that showed ileitis in the past (2019) suspicion for Crohn disease however patient did not follow-up. Has a colonoscopy and MRI enterography scheduled after was seen in the GI office recently  GI was consulted. Appreciate recommendations   Patient will follow-up with GI on discharge. Clostridioides difficile infection  Assessment & Plan  Patient was on antibiotics last month. Tested positive for C. Difficile on 11/22 (PCR and EIA toxin)  Started vancomycin 125 every 6 hours. Continue vancomycin for treatment and continue 3 days after the antibiotics.          Anemia  Assessment & Plan  Recent Labs     11/22/23  3838 11/23/23  0455 11/24/23  0441   HGB 11.4* 11.4* 11.9*       Stable    Lung nodule  Assessment & Plan  CT imaging "Indeterminate solid pulmonary nodule measuring 6 mm. "  Reports tobacco use x 13 years  Recommend repeat CT in 3-6 months to monitor lung nodule    Cigarette nicotine dependence without complication  Assessment & Plan  W/ lung nodule 6mm that will require follow up  Provided with nicotine patch  Pt is motivated to quit        Medical Problems       Resolved Problems  Date Reviewed: 11/20/2023   None       Discharging Physician / Practitioner: Modesta Shepard MD  PCP: No primary care provider on file. Admission Date:   Admission Orders (From admission, onward)       Ordered        11/20/23 [place]  Once                          Discharge Date: 11/24/23    Consultations During Hospital Stay:  GI, surgery    Procedures Performed:   None    Significant Findings / Test Results:   CT abdomen pelvis with contrast  Result Date: 11/20/2023  Impression: Interval enlargement of the mesenteric multiloculated rim-enhancing collection between the inflamed sigmoid colon and distal ileum. It is presumed to be abscess, but fistulous communication is difficult to completely exclude. No gas within the collection. Concern for stricture of the affected segment of terminal and distal ileum, unchanged. No new abnormalities. Indeterminate solid pulmonary nodule measuring 6 mm. In a low-risk patient with a solid nodule of 6-8 mm, recommend CT at 6-12 months, then consider CT at 18-24 months. Incidental Findings:   See above    I reviewed the above mentioned incidental findings with the patient and/or family and they expressed understanding.     Test Results Pending at Discharge (will require follow up):   none     Outpatient Tests Requested:  Ct ap     Complications:  none     Reason for Admission: abdominal pain    Hospital Course:   Keith Coelho is a 28 y.o. male patient who originally presented to the hospital on 11/20/2023 due to worsening abdominal pain. Patient has a recent history of Crohn suspicion and a small intestinal abscess that was nondrainable and patient was discharged on antibiotics for 12 days on 10/27. Reported worsening diarrhea, mix watery and formed. On presentation patient had CT abdomen pelvis that showed interval enlargement of the mesenteric multiloculated collection between the inflamed sigmoid colon and distal ileum. Was started on antibiotics. Surgery consulted, it was recommended antibiotic management and follow-up in 2 weeks after CT abdomen pelvis scan. Because of diarrhea, patient had stool studies that came back positive for C. Difficile. And was started on vancomycin. Patient stable for discharge. Will continue Augmentin to complete 14 days of treatment per surgery recommendation. He will continue with vancomycin 3 days after discontinuing antibiotics. Follow-up with surgery and GI as outpatient. Follow-up with PCP in 1 week. Please see above list of diagnoses and related plan for additional information. Condition at Discharge: good    Discharge Day Visit / Exam:   Subjective:  pain and diarrhea improved   Vitals: Blood Pressure: 110/67 (11/24/23 0835)  Pulse: 77 (11/24/23 0835)  Temperature: 97.7 °F (36.5 °C) (11/24/23 0835)  Temp Source: Temporal (11/23/23 1316)  Respirations: 18 (11/23/23 2052)  Height: 5' 8" (172.7 cm) (11/20/23 1852)  Weight - Scale: 63 kg (139 lb) (11/20/23 1852)  SpO2: 95 % (11/24/23 0835)  Exam:   Physical Exam  Vitals and nursing note reviewed. Constitutional:       General: He is not in acute distress. Appearance: He is not diaphoretic. HENT:      Head: Normocephalic. Eyes:      General: No scleral icterus. Right eye: No discharge. Left eye: No discharge. Cardiovascular:      Rate and Rhythm: Normal rate and regular rhythm. Heart sounds: No murmur heard.   Pulmonary:      Effort: Pulmonary effort is normal. No respiratory distress. Breath sounds: Normal breath sounds. No wheezing, rhonchi or rales. Abdominal:      General: There is no distension. Palpations: Abdomen is soft. Tenderness: There is no abdominal tenderness. There is no guarding or rebound. Musculoskeletal:      Cervical back: Normal range of motion. Right lower leg: No edema. Left lower leg: No edema. Skin:     General: Skin is warm. Neurological:      Mental Status: He is alert and oriented to person, place, and time. Psychiatric:         Mood and Affect: Mood normal.         Behavior: Behavior normal.         Thought Content: Thought content normal.         Judgment: Judgment normal.          Discussion with Family: Patient declined call to . Discharge instructions/Information to patient and family:   See after visit summary for information provided to patient and family. Provisions for Follow-Up Care:  See after visit summary for information related to follow-up care and any pertinent home health orders. Mobility at time of Discharge:   Basic Mobility Inpatient Raw Score: 24  JH-HLM Goal: 8: Walk 250 feet or more  JH-HLM Achieved: 8: Walk 250 feet ot more  HLM Goal achieved. Continue to encourage appropriate mobility. Disposition:   Home    Planned Readmission: no     Discharge Statement:  I spent 60 minutes discharging the patient. This time was spent on the day of discharge. I had direct contact with the patient on the day of discharge. Greater than 50% of the total time was spent examining patient, answering all patient questions, arranging and discussing plan of care with patient as well as directly providing post-discharge instructions. Additional time then spent on discharge activities. Discharge Medications:  See after visit summary for reconciled discharge medications provided to patient and/or family.       **Please Note: This note may have been constructed using a voice recognition system**

## 2023-11-24 NOTE — PLAN OF CARE
Problem: INFECTION - ADULT  Goal: Absence or prevention of progression during hospitalization  Description: INTERVENTIONS:  - Assess and monitor for signs and symptoms of infection  - Monitor lab/diagnostic results  - Monitor all insertion sites, i.e. indwelling lines, tubes, and drains  - Monitor endotracheal if appropriate and nasal secretions for changes in amount and color  - [place] appropriate cooling/warming therapies per order  - Administer medications as ordered  - Instruct and encourage patient and family to use good hand hygiene technique  - Identify and instruct in appropriate isolation precautions for identified infection/condition  Outcome: Progressing     Problem: PAIN - ADULT  Goal: Verbalizes/displays adequate comfort level or baseline comfort level  Description: Interventions:  - Encourage patient to monitor pain and request assistance  - Assess pain using appropriate pain scale  - Administer analgesics based on type and severity of pain and evaluate response  - Implement non-pharmacological measures as appropriate and evaluate response  - Consider cultural and social influences on pain and pain management  - Notify physician/advanced practitioner if interventions unsuccessful or patient reports new pain  Outcome: Progressing     Problem: SAFETY ADULT  Goal: Patient will remain free of falls  Description: INTERVENTIONS:  - Educate patient/family on patient safety including physical limitations  - Instruct patient to call for assistance with activity   - Consult OT/PT to assist with strengthening/mobility   - Keep Call bell within reach  - Keep bed low and locked with side rails adjusted as appropriate  - Keep care items and personal belongings within reach  - Initiate and maintain comfort rounds  - Make Fall Risk Sign visible to staff  - Offer Toileting every 2 Hours, in advance of need  - Initiate/Maintain alarm  - Obtain necessary fall risk management equipment:   - Apply yellow socks and bracelet for high fall risk patients  - Consider moving patient to room near nurses station  Outcome: Progressing     Problem: SAFETY ADULT  Goal: Maintain or return to baseline ADL function  Description: INTERVENTIONS:  -  Assess patient's ability to carry out ADLs; assess patient's baseline for ADL function and identify physical deficits which impact ability to perform ADLs (bathing, care of mouth/teeth, toileting, grooming, dressing, etc.)  - Assess/evaluate cause of self-care deficits   - Assess range of motion  - Assess patient's mobility; develop plan if impaired  - Assess patient's need for assistive devices and provide as appropriate  - Encourage maximum independence but intervene and supervise when necessary  - Involve family in performance of ADLs  - Assess for home care needs following discharge   - Consider OT consult to assist with ADL evaluation and planning for discharge  - Provide patient education as appropriate  Outcome: Progressing

## 2023-11-24 NOTE — ASSESSMENT & PLAN NOTE
Presents for worsening abdominal pain x 2 days, reports mild abdominal pain since discharge on 10/27/23. Repeat CT today "Interval enlargement of the mesenteric multiloculated rim-enhancing collection between the inflamed sigmoid colon and distal ileum. It is presumed to be abscess, but fistulous communication is difficult to completely exclude. No gas within the collection. Concern for stricture of the affected segment of terminal and distal ileum, unchanged."  IR consulted. Per their note, patient is not amenable to have percutaneous drainage  Spoke with surgery. Surgery recommended discharged home on Augmentin for 14 days and have a CT abdomen pelvis with and without contrast in 2 weeks. Patient tested positive for C. difficile. Continue Augmentin to complete 14 days   CT abdomen pelvis without without contrast ordered.   Will follow-up with surgery in 2 weeks

## 2023-11-24 NOTE — ASSESSMENT & PLAN NOTE
Patient was on antibiotics last month. Tested positive for C. Difficile on 11/22 (PCR and EIA toxin)  Started vancomycin 125 every 6 hours. Continue vancomycin for treatment and continue 3 days after the antibiotics.

## 2023-11-24 NOTE — CASE MANAGEMENT
Case Management Progress Note    Patient name Татьяна Martínez  Location /-22 MRN [id number]  : 1990 Date 2023       LOS (days): 4  Geometric Mean LOS (GMLOS) (days):   Days to GMLOS:        OBJECTIVE:        Current admission status: Inpatient  Preferred Pharmacy:   [address], [address] [facility] [address] [facility] [address] [place] [address]  Phone: [phone] Fax: [address], [address] [facility],  [address] [facility],  [address] [facility]  Phone: [phone] Fax: [address] [facility], [address], [address] [facility] [address] [facility] [address] [facility] [address] [facility] [address] [facility] [address] [facility]  Phone: [phone] Fax: [phone]    [facility] [address], [address] [facility] [address] [place] [id number] [facility]  [address] [address]  Phone: [phone] Fax: [phone]    Primary Care Provider: No primary care provider on file. Primary Insurance: BLUE CROSS  Secondary Insurance:     PROGRESS NOTE:    CM was informed that copay for pt's Vancomycin in over $2,000. CM called [facility] pharmacy and spoke to [facility] who states only a 10 day supply is covered. Anything more will require a prior auth. Insurance phone# [phone]. ID# [id number]. CM called pt's insurance and spoke to [place]; questions answered. CM was informed by Aysha that Vancomycin was approved for 21 days and approval sent to pt's pharmacy. CM called pt's pharmacy and spoke to [facility] who states copy for 21 days is $7 and medication is in stock. CM informed Dr. Pedro Luis Andre and patient of same.

## 2023-11-24 NOTE — PLAN OF CARE
Problem: PAIN - ADULT  Goal: Verbalizes/displays adequate comfort level or baseline comfort level  Description: Interventions:  - Encourage patient to monitor pain and request assistance  - Assess pain using appropriate pain scale  - Administer analgesics based on type and severity of pain and evaluate response  - Implement non-pharmacological measures as appropriate and evaluate response  - Consider cultural and social influences on pain and pain management  - Notify physician/advanced practitioner if interventions unsuccessful or patient reports new pain  Outcome: Adequate for Discharge     Problem: INFECTION - ADULT  Goal: Absence or prevention of progression during hospitalization  Description: INTERVENTIONS:  - Assess and monitor for signs and symptoms of infection  - Monitor lab/diagnostic results  - Monitor all insertion sites, i.e. indwelling lines, tubes, and drains  - Monitor endotracheal if appropriate and nasal secretions for changes in amount and color  - [place] appropriate cooling/warming therapies per order  - Administer medications as ordered  - Instruct and encourage patient and family to use good hand hygiene technique  - Identify and instruct in appropriate isolation precautions for identified infection/condition  Outcome: Adequate for Discharge  Goal: Absence of fever/infection during neutropenic period  Description: INTERVENTIONS:  - Monitor WBC    Outcome: Adequate for Discharge     Problem: SAFETY ADULT  Goal: Patient will remain free of falls  Description: INTERVENTIONS:  - Educate patient/family on patient safety including physical limitations  - Instruct patient to call for assistance with activity   - Consult OT/PT to assist with strengthening/mobility   - Keep Call bell within reach  - Keep bed low and locked with side rails adjusted as appropriate  - Keep care items and personal belongings within reach  - Initiate and maintain comfort rounds  - Make Fall Risk Sign visible to staff  - Offer Toileting every 2 Hours, in advance of need  - Initiate/Maintain bed alarm  - Obtain necessary fall risk management equipment:   - Apply yellow socks and bracelet for high fall risk patients  - Consider moving patient to room near nurses station  Outcome: Adequate for Discharge  Goal: Maintain or return to baseline ADL function  Description: INTERVENTIONS:  -  Assess patient's ability to carry out ADLs; assess patient's baseline for ADL function and identify physical deficits which impact ability to perform ADLs (bathing, care of mouth/teeth, toileting, grooming, dressing, etc.)  - Assess/evaluate cause of self-care deficits   - Assess range of motion  - Assess patient's mobility; develop plan if impaired  - Assess patient's need for assistive devices and provide as appropriate  - Encourage maximum independence but intervene and supervise when necessary  - Involve family in performance of ADLs  - Assess for home care needs following discharge   - Consider OT consult to assist with ADL evaluation and planning for discharge  - Provide patient education as appropriate  Outcome: Adequate for Discharge  Goal: Maintains/Returns to pre admission functional level  Description: INTERVENTIONS:  - Perform AM-PAC 6 Click Basic Mobility/ Daily Activity assessment daily.  - Set and communicate daily mobility goal to care team and patient/family/caregiver. - Collaborate with rehabilitation services on mobility goals if consulted  - Perform Range of Motion 3 times a day. - Reposition patient every 3 hours.   - Dangle patient 3 times a day  - Stand patient 3 times a day  - Ambulate patient 3 times a day  - Out of bed to chair 3 times a day   - Out of bed for meals 3 times a day  - Out of bed for toileting  - Record patient progress and toleration of activity level   Outcome: Adequate for Discharge     Problem: DISCHARGE PLANNING  Goal: Discharge to home or other facility with appropriate resources  Description: INTERVENTIONS:  - Identify barriers to discharge w/patient and caregiver  - Arrange for needed discharge resources and transportation as appropriate  - Identify discharge learning needs (meds, wound care, etc.)  - Arrange for interpretive services to assist at discharge as needed  - Refer to Case Management Department for coordinating discharge planning if the patient needs post-hospital services based on physician/advanced practitioner order or complex needs related to functional status, cognitive ability, or social support system  Outcome: Adequate for Discharge

## 2023-11-25 LAB — BACTERIA BLD CULT: NORMAL

## 2023-11-26 LAB — BACTERIA BLD CULT: NORMAL

## 2023-11-27 NOTE — UTILIZATION REVIEW
Initial Clinical Review    Admission: Date/Time/Statement:   Admission Orders (From admission, onward)       Ordered        11/20/23 1749  INPATIENT ADMISSION  Once                          Orders Placed This Encounter   Procedures    INPATIENT ADMISSION     Standing Status:   Standing     Number of Occurrences:   1     Order Specific Question:   Level of Care     Answer:   Med Surg [16]     Order Specific Question:   Estimated length of stay     Answer:   More than 2 Midnights     Order Specific Question:   Certification     Answer:   I certify that inpatient services are medically necessary for this patient for a duration of greater than two midnights. See H&P and MD Progress Notes for additional information about the patient's course of treatment. ED Arrival Information       Expected   -    Arrival   11/20/2023 15:10    Acuity   Urgent              Means of arrival   Walk-In    Escorted by   Self    Service   Hospitalist    Admission type   Emergency              Arrival complaint   abd pain             Chief Complaint   Patient presents with    Abdominal Pain       Initial Presentation: 28 y.o. male w/ PMH of tobacco use and Crohn's disease with recently found abscess presented to the ED from home with worsening abdominal pain x2 days. Patient was admitted on 10/26-10/27 and was diagnosed with Crohn's with a small intestinal abscess which was nondrainable, discharged on antibiotics for course of 12 days. Reports very mild abdominal pain on discharge, however acutely worse in the last 2 days, sharp/stabbing in nature. Denies any fevers, diarrhea, bloody stools. In the ED, CT imaging showed worsening abscess. Started on IV Zosyn. On exam, abdominal tenderness present. Admit as Inpatient for evaluation and treatment of Crohn's disease, intestinal abscess. Plan: Start Zosyn. Consult Surgery for possible I&D. Consult GI. NPO at midnight.     11/20 Gen Surg Consult: CT with enlargement of multiloculated rim enhancing collection, presumed to be abscess but cannot exclude fistula. Plan: no emergent surgical intervention warranted at this time. recommend IR consult for evaluation for drainage. cont IV abx. prn pain, antiemetic regimen. dvt ppx. GI consult    Date: 11/20   Day 2:   IR Consult: CT of 11/20/23 showing increasing multiloculated collection in the mid pelvis surrounded by bowel loops anteriorly and pelvis posteriorly and not amenable for percutaneous drain placement. Plan for surgical exploration. GI Consult: patient was previously evaluated in 2019 for abdominal pain and change in bowel habits. CT scan with ileitis. Colonoscopy with ileitis. Suspected Crohn's disease was a diagnosis but that was never followed up. He has been scheduled for colonoscopy and possibly MRI enterography. Also noted possible abscess with fistulous tract to the ileum. Also reports unintentional wt loss of 11 lbs in 1-2 months. Will likely need colonoscopy to determine whether or not Crohn's is present and may require EGD as well if continued weight loss. Gen Surg: Pt reports worsening pain and continued weight loss. Continue plan for conservative management at this time, IV antibiotics, bowel rest, IVFs. If IR unable to drain collection, will need to consider diagnostic laparoscopy, drainage of abscess. Continue to trend WBC, fever curve, vital signs. Nutrition consult.   PE: abd flat, soft,  tenderness across lower abdomen without rebound or guarding, few bowel sounds     ED Triage Vitals [11/20/23 1518]   Temperature Pulse Respirations Blood Pressure SpO2   97.7 °F (36.5 °C) 86 18 141/72 99 %      Temp Source Heart Rate Source Patient Position - Orthostatic VS BP Location FiO2 (%)   Temporal Monitor Lying Right arm --      Pain Score       5          Wt Readings from Last 1 Encounters:   11/20/23 63 kg (139 lb)     Additional Vital Signs:   Date/Time Temp Pulse Resp BP MAP (mmHg) SpO2 O2 Device Patient Position - Orthostatic VS   11/21/23 07:55:13 97.5 °F (36.4 °C) 75 19 102/71 81 98 % -- --   11/21/23 0100 -- -- -- -- -- -- None (Room air) --   11/20/23 20:29:17 97.7 °F (36.5 °C) 77 -- 93/60 71 97 % -- --   11/20/23 1852 97.7 °F (36.5 °C) 86 18 116/76 -- -- -- --   11/20/23 1825 97.7 °F (36.5 °C) -- -- 116/76 -- -- -- Lying       Pertinent Labs/Diagnostic Test Results:   CT abdomen pelvis with contrast   Final Result by Fariha Rivera MD (11/20 1714)   Interval enlargement of the mesenteric multiloculated rim-enhancing collection between the inflamed sigmoid colon and distal ileum. It is presumed to be abscess, but fistulous communication is difficult to completely exclude. No gas within the    collection. Concern for stricture of the affected segment of terminal and distal ileum, unchanged. No new abnormalities. Indeterminate solid pulmonary nodule measuring 6 mm. In a low-risk patient with a solid nodule of 6-8 mm, recommend CT at 6-12 months, then consider CT at 18-24 months.             Workstation performed: [id number]         CT abdomen pelvis w wo contrast    (Results Pending)         Results from last 7 days   Lab Units 11/24/23 0441 11/23/23 0455 11/22/23  0529 11/21/23  0455 11/20/23  1532   WBC Thousand/uL 8.23 12.99* 11.03* 11.19* 9.79   HEMOGLOBIN g/dL 11.9* 11.4* 11.4* 10.7* 12.3   HEMATOCRIT % 37.5 35.7* 35.2* 33.5* 38.8   PLATELETS Thousands/uL 452* 414* 418* 400* 434*   NEUTROS ABS Thousands/µL  --   --   --  8.04*  --            Results from last 7 days   Lab Units 11/24/23  0441 11/23/23  0455 11/22/23  0529 11/21/23  0455 11/20/23  1532   SODIUM mmol/L 137 136 138 136 133*   POTASSIUM mmol/L 4.2 4.3 4.2 4.1 3.9   CHLORIDE mmol/L 102 105 106 104 96   CO2 mmol/L 27 25 24 25 29   ANION GAP mmol/L 8 6 8 7 8   BUN mg/dL 13 8 6 16 14   CREATININE mg/dL 0.72 0.74 0.84 0.92 0.89   EGFR ml/min/1.73sq m 121 120 114 108 111   CALCIUM mg/dL 9.4 8.9 9.1 8.7 9.4       Results from last 7 days   Lab Units 11/21/23  0455 11/20/23  1532   AST U/L 9* 12*   ALT U/L 8 10   ALK PHOS U/L 54 57   TOTAL PROTEIN g/dL 6.5 7.8   ALBUMIN g/dL 3.3* 3.8   TOTAL BILIRUBIN mg/dL 0.28 0.46           Results from last 7 days   Lab Units 11/24/23  0441 11/23/23  0455 11/22/23  0529 11/21/23  0455 11/20/23  1532   GLUCOSE RANDOM mg/dL 105 103 110 137 128         Results from last 7 days   Lab Units 11/20/23  1532   LACTIC ACID mmol/L 0.7             Results from last 7 days   Lab Units 11/20/23  1532   LIPASE u/L 17                   Results from last 7 days   Lab Units 11/20/23  1555   CLARITY UA  Clear   COLOR UA  Light Yellow   SPEC GRAV UA  <1.005*   PH UA  6.0   GLUCOSE UA mg/dl Negative   KETONES UA mg/dl Negative   BLOOD UA  Negative   PROTEIN UA mg/dl Negative   NITRITE UA  Negative   BILIRUBIN UA  Negative   UROBILINOGEN UA (BE) mg/dl <2.0   LEUKOCYTES UA  Negative               Results from last 7 days   Lab Units 11/20/23  2314 11/20/23  1805   BLOOD CULTURE  No Growth After 5 Days. No Growth After 5 Days.            ED Treatment:   Medication Administration from 11/20/2023 1510 to 11/20/2023 1819         Date/Time Order Dose Route Action     11/20/2023 1607 EST sodium chloride 0.9 % bolus 500 mL 500 mL Intravenous New Bag     11/20/2023 1625 EST iohexol (OMNIPAQUE) 350 MG/ML injection (MULTI-DOSE) 100 mL 100 mL Intravenous Given     11/20/2023 1806 EST piperacillin-tazobactam (ZOSYN) IVPB 3.375 g 3.375 g Intravenous New Bag          Past Medical History:   Diagnosis Date    Crohn's colitis ([address])      Present on Admission:   Cigarette nicotine dependence without complication   Crohn's disease of both small and large intestine with abscess ([address])   Intestinal abscess      Admitting Diagnosis: Abdominal pain [R10.9]  Pulmonary nodule [R91.1]  Lower abdominal pain [R10.30]  Crohn's disease, colon, with abscess ([address]) [K50.114]  Age/Sex: 28 y.o. male  Admission Orders:  NPO  Scheduled Medications:  nicotine, 14 mg, Transdermal, Daily  piperacillin-tazobactam, 3.375 g, Intravenous, Q6H      Continuous IV Infusions: none  No current facility-administered medications for this encounter. PRN Meds:  acetaminophen, 975 mg, Oral, Q8H PRN  ketorolac, 15 mg, Intravenous, Q6H PRN 11/20 x 1, 11/21 x 1        IP CONSULT TO ACUTE CARE SURGERY  INPATIENT CONSULT TO IR    Network Utilization Review Department  ATTENTION: Please call with any questions or concerns to [phone] and carefully listen to the prompts so that you are directed to the right person. All voicemails are confidential.   For Discharge needs, contact Care Management DC Support Team at [phone] opt. 2  Send all requests for admission clinical reviews, approved or denied determinations and any other requests to dedicated fax number below belonging to the campus where the patient is receiving treatment.  List of dedicated fax numbers for the Facilities:  [place] DENIALS (Administrative/Medical Necessity) [phone]   DISCHARGE SUPPORT TEAM (NETWORK) [id number] [address] (Maternity/NICU/Pediatrics) [phone]   [address] [place] [address] Drive [address] [facility] [phone]   [address] [facility] [address] [facility] [address] [address] [facility] [address] [facility] [address] [place] [address] [phone]

## 2023-12-07 ENCOUNTER — HOSPITAL ENCOUNTER (OUTPATIENT)
Dept: CT IMAGING | Facility: HOSPITAL | Age: 33
Discharge: HOME | End: 2023-12-07
Attending: INTERNAL MEDICINE
Payer: COMMERCIAL

## 2023-12-07 DIAGNOSIS — K63.0 INTESTINAL ABSCESS: ICD-10-CM

## 2023-12-07 PROCEDURE — 74177 CT ABD & PELVIS W/CONTRAST: CPT

## 2023-12-07 PROCEDURE — G1004 CDSM NDSC: HCPCS

## 2023-12-07 RX ADMIN — IOHEXOL 80 ML: 350 INJECTION, SOLUTION INTRAVENOUS at 18:27

## 2023-12-11 ENCOUNTER — NURSE TRIAGE (OUTPATIENT)
Age: 33
End: 2023-12-11

## 2023-12-11 NOTE — TELEPHONE ENCOUNTER
Pt was recently hospitalized with CT abdomen ordered at discharge. Also ordered to follow up with General Surgery. Completed CT 12/7/2023. Pt is questioning if results are in and reviewed by GI provider. Pt would like to know when he can return to work and has GI OV 1/11/2024. I advised CT not yet resulted. Pt verbalized understanding. Reason for Disposition   Information only question and nurse able to answer    Answer Assessment - Initial Assessment Questions  1. REASON FOR CALL or QUESTION:     Pt is questioning if CT results are in and reviewed by GI provider. Protocols used:  Information Only Call - No Triage-ADULT-[place]

## 2023-12-14 NOTE — TELEPHONE ENCOUNTER
Discussed with patient. Pelvic abscess improved. Still with thinking ileitis. Patient completed course of vancomycin for C. difficile. He denies any abdominal pain. Stool still soft. Wants to get back to work    Plan: Okay to return to work as a . MRI to evaluate ileum as ordered.   Follow-up office visit as scheduled in January

## 2023-12-20 ENCOUNTER — HOSPITAL ENCOUNTER (OUTPATIENT)
Dept: MRI IMAGING | Facility: HOSPITAL | Age: 33
Discharge: HOME | End: 2023-12-20
Attending: INTERNAL MEDICINE
Payer: COMMERCIAL

## 2023-12-20 DIAGNOSIS — K50.814 CROHN'S DISEASE OF BOTH SMALL AND LARGE INTESTINE WITH ABSCESS (HCC): ICD-10-CM

## 2023-12-20 PROCEDURE — G1004 CDSM NDSC: HCPCS

## 2023-12-20 PROCEDURE — 74183 MRI ABD W/O CNTR FLWD CNTR: CPT

## 2023-12-20 PROCEDURE — 72197 MRI PELVIS W/O & W/DYE: CPT

## 2023-12-20 PROCEDURE — A9585 GADOBUTROL INJECTION: HCPCS | Performed by: INTERNAL MEDICINE

## 2023-12-20 RX ORDER — GADOBUTROL 604.72 MG/ML
6 INJECTION INTRAVENOUS
Status: COMPLETED | OUTPATIENT
Start: 2023-12-20 | End: 2023-12-20

## 2023-12-20 RX ADMIN — GADOBUTROL 6 ML: 604.72 INJECTION INTRAVENOUS at 11:01

## 2023-12-20 RX ADMIN — GLUCAGON 1 MG: 1 INJECTION, POWDER, LYOPHILIZED, FOR SOLUTION INTRAMUSCULAR; INTRAVENOUS at 10:11

## 2023-12-28 ENCOUNTER — NURSE TRIAGE (OUTPATIENT)
Age: 33
End: 2023-12-28

## 2023-12-28 ENCOUNTER — ANESTHESIA (OUTPATIENT)
Dept: ANESTHESIOLOGY | Facility: AMBULATORY SURGERY CENTER | Age: 33
End: 2023-12-28

## 2023-12-28 ENCOUNTER — ANESTHESIA EVENT (OUTPATIENT)
Dept: ANESTHESIOLOGY | Facility: AMBULATORY SURGERY CENTER | Age: 33
End: 2023-12-28

## 2023-12-28 NOTE — TELEPHONE ENCOUNTER
"  Last OV:11/1/23  Last Colonoscopy: scheduled for 1/11/24  Last EGD:n/a    Imaging: MRI enterography  Inflammation: Active inflammatory small bowel Crohn disease with luminal narrowing involving the approximate distal 15 cm of the ileum. The degree and extent of inflammation is similar to that of the December 7, 2023 CT abdomen/pelvis  *  Stricture: Stricture with imaging findings of active inflammation.  *  Penetrating complication: Inflammatory mass, which is similar to the December 7, 2022 CT abdomen/pelvis.  *  Perianal disease: None.  *  Other complications: None.       Next OV: pt. Would like to see Dr. Corona, next available is end of January, unable to schedule sooner but pt. Would be willing to see an assistant if you needed him to be seen sooner as they have more availability      Pt. C/o pain under rib cage radiating down under umbilical area, constant fullness, 7/10, LBM 12/28/23, pt. Feels he fully emptied out with some mild relief of pain but continues to have pain 5-10 min later , c/o nausea , denies vomiting , pt. Is passing flatus , reports pain similar to abscess he had in his groin in the past, denies blood in stool, reviewed alarming symptoms and when to go to ER, pt. Verbalized understanding       Reason for Disposition   Constant abdominal pain lasting > 2 hours    Answer Assessment - Initial Assessment Questions  1. LOCATION: \"Where does it hurt?\"       Under rib cage   2. RADIATION: \"Does the pain shoot anywhere else?\" (e.g., chest, back)      Down under umbilical region   3. ONSET: \"When did the pain begin?\" (Minutes, hours or days ago)       Yesterday afternoon   4. SUDDEN: \"Gradual or sudden onset?\"      Gradual   5. PATTERN \"Does the pain come and go, or is it constant?\"     - If constant: \"Is it getting better, staying the same, or worsening?\"       (Note: Constant means the pain never goes away completely; most serious pain is constant and it progresses)      - If intermittent: \"How " "long does it last?\" \"Do you have pain now?\"      (Note: Intermittent means the pain goes away completely between bouts)      Bloating, fullness  6. SEVERITY: \"How bad is the pain?\"  (e.g., Scale 1-10; mild, moderate, or severe)     - MILD (1-3): doesn't interfere with normal activities, abdomen soft and not tender to touch      - MODERATE (4-7): interferes with normal activities or awakens from sleep, tender to touch      - SEVERE (8-10): excruciating pain, doubled over, unable to do any normal activities        7/10  7. RECURRENT SYMPTOM: \"Have you ever had this type of stomach pain before?\" If Yes, ask: \"When was the last time?\" and \"What happened that time?\"       Unsure   8. CAUSE: \"What do you think is causing the stomach pain?\"      Has inflammation that was found on MRI   9. RELIEVING/AGGRAVATING FACTORS: \"What makes it better or worse?\" (e.g., movement, antacids, bowel movement)      Denies   10. OTHER SYMPTOMS: \"Has there been any vomiting, diarrhea, constipation, or urine problems?\"        Nausea and diaphoresis when pain is severe    Protocols used: Abdominal Pain - Male-ADULT-[place]    "

## 2023-12-28 NOTE — TELEPHONE ENCOUNTER
LVM advising patient that Dr. Corona said he will discuss with him at colonoscopy on 1/11/2024.  Asked that he call back to confirm receipt of this message.

## 2024-01-04 ENCOUNTER — TELEPHONE (OUTPATIENT)
Dept: GASTROENTEROLOGY | Facility: CLINIC | Age: 34
End: 2024-01-04

## 2024-01-11 ENCOUNTER — ANESTHESIA (OUTPATIENT)
Dept: GASTROENTEROLOGY | Facility: AMBULATORY SURGERY CENTER | Age: 34
End: 2024-01-11

## 2024-01-11 ENCOUNTER — HOSPITAL ENCOUNTER (OUTPATIENT)
Dept: GASTROENTEROLOGY | Facility: AMBULATORY SURGERY CENTER | Age: 34
Discharge: HOME | End: 2024-01-11
Payer: COMMERCIAL

## 2024-01-11 ENCOUNTER — ANESTHESIA EVENT (OUTPATIENT)
Dept: GASTROENTEROLOGY | Facility: AMBULATORY SURGERY CENTER | Age: 34
End: 2024-01-11

## 2024-01-11 VITALS
TEMPERATURE: 98 F | DIASTOLIC BLOOD PRESSURE: 62 MMHG | OXYGEN SATURATION: 99 % | HEART RATE: 60 BPM | HEIGHT: 68 IN | WEIGHT: 130 LBS | SYSTOLIC BLOOD PRESSURE: 116 MMHG | BODY MASS INDEX: 19.7 KG/M2 | RESPIRATION RATE: 17 BRPM

## 2024-01-11 DIAGNOSIS — K50.814 CROHN'S DISEASE OF BOTH SMALL AND LARGE INTESTINE WITH ABSCESS (HCC): ICD-10-CM

## 2024-01-11 PROCEDURE — 45380 COLONOSCOPY AND BIOPSY: CPT | Performed by: INTERNAL MEDICINE

## 2024-01-11 PROCEDURE — 88305 TISSUE EXAM BY PATHOLOGIST: CPT | Performed by: PATHOLOGY

## 2024-01-11 RX ORDER — SODIUM CHLORIDE, SODIUM LACTATE, POTASSIUM CHLORIDE, CALCIUM CHLORIDE 600; 310; 30; 20 MG/100ML; MG/100ML; MG/100ML; MG/100ML
50 INJECTION, SOLUTION INTRAVENOUS CONTINUOUS
Status: DISCONTINUED | OUTPATIENT
Start: 2024-01-11 | End: 2024-01-15 | Stop reason: HOSPADM

## 2024-01-11 RX ORDER — SODIUM CHLORIDE, SODIUM LACTATE, POTASSIUM CHLORIDE, CALCIUM CHLORIDE 600; 310; 30; 20 MG/100ML; MG/100ML; MG/100ML; MG/100ML
INJECTION, SOLUTION INTRAVENOUS CONTINUOUS PRN
Status: DISCONTINUED | OUTPATIENT
Start: 2024-01-11 | End: 2024-01-11

## 2024-01-11 RX ORDER — PROPOFOL 10 MG/ML
INJECTION, EMULSION INTRAVENOUS AS NEEDED
Status: DISCONTINUED | OUTPATIENT
Start: 2024-01-11 | End: 2024-01-11

## 2024-01-11 RX ADMIN — PROPOFOL 25 MG: 10 INJECTION, EMULSION INTRAVENOUS at 12:04

## 2024-01-11 RX ADMIN — PROPOFOL 100 MG: 10 INJECTION, EMULSION INTRAVENOUS at 11:48

## 2024-01-11 RX ADMIN — PROPOFOL 25 MG: 10 INJECTION, EMULSION INTRAVENOUS at 11:50

## 2024-01-11 RX ADMIN — SODIUM CHLORIDE, SODIUM LACTATE, POTASSIUM CHLORIDE, CALCIUM CHLORIDE: 600; 310; 30; 20 INJECTION, SOLUTION INTRAVENOUS at 11:36

## 2024-01-11 RX ADMIN — PROPOFOL 25 MG: 10 INJECTION, EMULSION INTRAVENOUS at 12:13

## 2024-01-11 RX ADMIN — PROPOFOL 25 MG: 10 INJECTION, EMULSION INTRAVENOUS at 11:52

## 2024-01-11 RX ADMIN — PROPOFOL 25 MG: 10 INJECTION, EMULSION INTRAVENOUS at 12:02

## 2024-01-11 RX ADMIN — PROPOFOL 25 MG: 10 INJECTION, EMULSION INTRAVENOUS at 11:54

## 2024-01-11 RX ADMIN — PROPOFOL 100 MG: 10 INJECTION, EMULSION INTRAVENOUS at 11:49

## 2024-01-11 RX ADMIN — PROPOFOL 25 MG: 10 INJECTION, EMULSION INTRAVENOUS at 12:09

## 2024-01-11 RX ADMIN — PROPOFOL 25 MG: 10 INJECTION, EMULSION INTRAVENOUS at 11:51

## 2024-01-11 RX ADMIN — PROPOFOL 25 MG: 10 INJECTION, EMULSION INTRAVENOUS at 12:11

## 2024-01-11 RX ADMIN — PROPOFOL 25 MG: 10 INJECTION, EMULSION INTRAVENOUS at 12:07

## 2024-01-11 RX ADMIN — PROPOFOL 25 MG: 10 INJECTION, EMULSION INTRAVENOUS at 12:00

## 2024-01-11 RX ADMIN — PROPOFOL 25 MG: 10 INJECTION, EMULSION INTRAVENOUS at 11:56

## 2024-01-11 RX ADMIN — PROPOFOL 25 MG: 10 INJECTION, EMULSION INTRAVENOUS at 11:58

## 2024-01-11 RX ADMIN — SODIUM CHLORIDE, SODIUM LACTATE, POTASSIUM CHLORIDE, CALCIUM CHLORIDE 50 ML/HR: 600; 310; 30; 20 INJECTION, SOLUTION INTRAVENOUS at 11:19

## 2024-01-11 NOTE — ANESTHESIA POSTPROCEDURE EVALUATION
Post-Op Assessment Note    CV Status:  Stable    Pain management: adequate       Mental Status:  Sleepy and lethargic   Hydration Status:  Stable   PONV Controlled:  None   Airway Patency:  Patent     Post Op Vitals Reviewed: Yes      Staff: Anesthesiologist, CRNA               BP      Temp      Pulse     Resp      SpO2

## 2024-01-11 NOTE — H&P
"History and Physical -  [facility]  Victor Hugo Hurtado 33 y.o. male MRN: [id number]    HPI: Victor Hugo Hurtado is a 33 y.o. year old male who presents for colonoscopy secondary to Crohn's disease    REVIEW OF SYSTEMS: Per the HPI, and otherwise unremarkable.    Historical Information   Past Medical History:   Diagnosis Date    Crohn's colitis (HCC)      Past Surgical History:   Procedure Laterality Date    COLONOSCOPY       Social History   Social History     Substance and Sexual Activity   Alcohol Use Never     Social History     Substance and Sexual Activity   Drug Use Yes    Types: Marijuana     Social History     Tobacco Use   Smoking Status Every Day    Current packs/day: 0.25    Types: Cigarettes   Smokeless Tobacco Never     Family History   Problem Relation Age of Onset    Colon cancer Neg Hx     Colon polyps Neg Hx        Meds/Allergies     No current outpatient medications on file.    Current Facility-Administered Medications:     lactated ringers infusion, 50 mL/hr, Intravenous, Continuous, 50 mL/hr at 01/11/24 1119    Facility-Administered Medications Ordered in Other Encounters:     lactated ringers infusion, , Intravenous, Continuous PRN, New Bag at 01/11/24 1136    Allergies   Allergen Reactions    Ammonia Nasal Congestion       Objective     /67   Pulse 76   Temp 98 °F (36.7 °C) (Temporal)   Resp 20   Ht 5' 8\" (1.727 m)   Wt 59 kg (130 lb)   SpO2 99%   BMI 19.77 kg/m²     PHYSICAL EXAM    Gen: NAD AAOx3  Head: Normocephalic, Atraumatic  CV: S1S2 RRR no m/r/g  CHEST: Clear b/l no c/r/w  ABD: soft, +BS NT/ND  EXT: no edema    ASSESSMENT/PLAN:  This is a 33 y.o. year old male here for colonoscopy secondary to Crohn's disease, and he is stable and optimized for his procedure.        "

## 2024-01-11 NOTE — ANESTHESIA PREPROCEDURE EVALUATION
Procedure:  COLONOSCOPY    Relevant Problems   HEMATOLOGY   (+) Anemia      Digestive   (+) Crohn's disease of both small and large intestine with abscess (HCC)      Other   (+) Cigarette nicotine dependence without complication        Physical Exam    Airway    Mallampati score: II  TM Distance: >3 FB  Neck ROM: full     Dental   No notable dental hx     Cardiovascular      Pulmonary      Other Findings        Anesthesia Plan  ASA Score- 2     Anesthesia Type- IV sedation with anesthesia with ASA Monitors.         Additional Monitors:     Airway Plan:            Plan Factors-    Chart reviewed.    Patient summary reviewed.    Patient is a current smoker.              Induction- intravenous.    Postoperative Plan-     Informed Consent- Anesthetic plan and risks discussed with patient.  I personally reviewed this patient with the CRNA. Discussed and agreed on the Anesthesia Plan with the CRNA..

## 2024-01-11 NOTE — DISCHARGE INSTRUCTIONS
Colonoscopy   WHAT YOU NEED TO KNOW:   A colonoscopy is a procedure to examine the inside of your colon (intestine) with a scope. Polyps or tissue growths may have been removed during your colonoscopy. It is normal to feel bloated and to have some abdominal discomfort. You should be passing gas. If you have hemorrhoids or you had polyps removed, you may have a small amount of bleeding.  Colonoscopy   WHAT YOU NEED TO KNOW:   A colonoscopy is a procedure to examine the inside of your colon (intestine) with a scope. Polyps or tissue growths may have been removed during your colonoscopy. It is normal to feel bloated and to have some abdominal discomfort. You should be passing gas. If you have hemorrhoids or you had polyps removed, you may have a small amount of bleeding.        DISCHARGE INSTRUCTIONS:   Seek care immediately if:   You have sudden, severe abdominal pain.     You have problems swallowing.     You have a large amount of black, sticky bowel movements or blood in your bowel movements.     You have sudden trouble breathing.     You feel weak, lightheaded, or faint or your heart beats faster than normal for you.     Contact your healthcare provider if:   You have a fever and chills.      You have nausea or are vomiting.      Your abdomen is bloated or feels full and hard.     You have abdominal pain.   You have black, sticky bowel movements or blood in your bowel movements.  You have not had a bowel movement for 3 days after your procedure.  You have rash or hives.  You have questions or concerns about your procedure.    Activity:   Do not lift, strain, or run for 24 hours after your procedure.     Rest after your procedure. You have been given medicine to relax you. Do not drive or make important decisions until the day after your procedure. Return to your normal activity as directed.     Relieve gas and discomfort from bloating by lying on your right side with a heating pad on your abdomen. You may need to  take short walks to help the gas move out. Eat small meals until bloating is relieved.  Follow up with your healthcare provider as directed: Write down your questions so you remember to ask them during your visits.     If you take a “blood thinner”, please review the specific instructions from your endoscopist about when you should resume it. These can be found in the “Recommendation” and “Your Medication list” sections of this After Visit Summary.         DISCHARGE INSTRUCTIONS:   Seek care immediately if:   You have sudden, severe abdominal pain.     You have problems swallowing.     You have a large amount of black, sticky bowel movements or blood in your bowel movements.     You have sudden trouble breathing.     You feel weak, lightheaded, or faint or your heart beats faster than normal for you.     Contact your healthcare provider if:   You have a fever and chills.      You have nausea or are vomiting.      Your abdomen is bloated or feels full and hard.     You have abdominal pain.   You have black, sticky bowel movements or blood in your bowel movements.  You have not had a bowel movement for 3 days after your procedure.  You have rash or hives.  You have questions or concerns about your procedure.    Activity:   Do not lift, strain, or run for 24 hours after your procedure.     Rest after your procedure. You have been given medicine to relax you. Do not drive or make important decisions until the day after your procedure. Return to your normal activity as directed.     Relieve gas and discomfort from bloating by lying on your right side with a heating pad on your abdomen. You may need to take short walks to help the gas move out. Eat small meals until bloating is relieved.  Follow up with your healthcare provider as directed: Write down your questions so you remember to ask them during your visits.     If you take a “blood thinner”, please review the specific instructions from your endoscopist about when  you should resume it. These can be found in the “Recommendation” and “Your Medication list” sections of this After Visit Summary.

## 2024-01-12 ENCOUNTER — TELEPHONE (OUTPATIENT)
Dept: GASTROENTEROLOGY | Facility: CLINIC | Age: 34
End: 2024-01-12

## 2024-01-12 NOTE — TELEPHONE ENCOUNTER
----- Message from Leandro Corona MD sent at 1/11/2024 12:31 PM EST -----  Patient with new diagnosis of stricturing Crohn's disease of TI.  Can we arrange for him to get started of Entyvio.  Thanks

## 2024-01-15 PROCEDURE — 88305 TISSUE EXAM BY PATHOLOGIST: CPT | Performed by: PATHOLOGY

## 2024-01-18 ENCOUNTER — APPOINTMENT (OUTPATIENT)
Dept: LAB | Facility: HOSPITAL | Age: 34
End: 2024-01-18
Payer: COMMERCIAL

## 2024-01-18 DIAGNOSIS — K50.814 CROHN'S DISEASE OF BOTH SMALL AND LARGE INTESTINE WITH ABSCESS (HCC): ICD-10-CM

## 2024-01-18 PROCEDURE — 36415 COLL VENOUS BLD VENIPUNCTURE: CPT

## 2024-01-18 PROCEDURE — 86706 HEP B SURFACE ANTIBODY: CPT

## 2024-01-18 PROCEDURE — 86480 TB TEST CELL IMMUN MEASURE: CPT

## 2024-01-18 PROCEDURE — 87340 HEPATITIS B SURFACE AG IA: CPT

## 2024-01-19 LAB
GAMMA INTERFERON BACKGROUND BLD IA-ACNC: <0 IU/ML
HBV SURFACE AB SER-ACNC: 28.1 MIU/ML
HBV SURFACE AG SER QL: NORMAL
M TB IFN-G BLD-IMP: NEGATIVE
M TB IFN-G CD4+ BCKGRND COR BLD-ACNC: 0 IU/ML
M TB IFN-G CD4+ BCKGRND COR BLD-ACNC: 0 IU/ML
MITOGEN IGNF BCKGRD COR BLD-ACNC: 7.93 IU/ML

## 2024-01-23 ENCOUNTER — TELEPHONE (OUTPATIENT)
Dept: GASTROENTEROLOGY | Facility: CLINIC | Age: 34
End: 2024-01-23

## 2024-01-23 NOTE — TELEPHONE ENCOUNTER
----- Message from Leandro Corona MD sent at 1/22/2024  3:35 PM EST -----  Left message on patient's answer machine.  Biopsies consistent with active inflammation which goes along with the newly diagnosed Crohn's disease.  Working on getting him started on Entyvio

## 2024-01-31 DIAGNOSIS — K50.814 CROHN'S DISEASE OF BOTH SMALL AND LARGE INTESTINE WITH ABSCESS (HCC): Primary | ICD-10-CM

## 2024-01-31 RX ORDER — PREDNISONE 5 MG/1
TABLET ORAL DAILY
Qty: 42 TABLET | Refills: 0 | Status: SHIPPED | OUTPATIENT
Start: 2024-01-31 | End: 2024-02-10

## 2024-01-31 NOTE — TELEPHONE ENCOUNTER
Patients GI provider:  Dr. Corona    Number to return call: ([phone]     Reason for call: Pt calling requesting an update on Entyvio. Patient states he is not feeling well and would like to start ASAP.    He is requesting a call back.    Scheduled procedure/appointment date if applicable: 04/12/2024

## 2024-01-31 NOTE — TELEPHONE ENCOUNTER
I spoke with the patient. Patient reports abdominal pain that comes and goes. Pain starts at the right lower abdomen and moves to left side then to the back. Subsides after patient passes gas. Currently pain 6 out of 10. 1 formed bowel movement daily. Reports tissue like discharge in toilet bowl after a bowel movement with minimal blood around the discharge.           Talked to [employer] and provided details of [facility] infusion center and confirmed dosage for Entyvio for 300 mg. Infuse at 0, 2, and 6 weeks and every 8 weeks thereafter. Insurance is looking for trial and failed of a course of 7 day prednisone/methotrexate/azathioprine. Carolyn REAL Also party of this phone call and Carolyn to submit authorization.

## 2024-01-31 NOTE — TELEPHONE ENCOUNTER
[facility] calling for information.  Call warm transferred to Elizabeth.

## 2024-02-01 NOTE — TELEPHONE ENCOUNTER
I spoke with the patient and relayed the provider's message regarding prednisone taper. Patient already had picked up the prednisone taper prescription, pt took first dose this am.

## 2024-02-09 DIAGNOSIS — K50.814 CROHN'S DISEASE OF BOTH SMALL AND LARGE INTESTINE WITH ABSCESS (HCC): ICD-10-CM

## 2024-02-12 RX ORDER — PREDNISONE 5 MG/1
TABLET ORAL DAILY
Qty: 42 TABLET | Refills: 0 | Status: SHIPPED | OUTPATIENT
Start: 2024-02-12 | End: 2024-02-21

## 2024-02-12 NOTE — TELEPHONE ENCOUNTER
10-day steroid taper sent 1/31 with correct # of prednisone tablets. Unclear if patient is to continue on this? Has upcoming follow up with you on 4/12/2024

## 2024-03-08 ENCOUNTER — TELEPHONE (OUTPATIENT)
Dept: GASTROENTEROLOGY | Facility: CLINIC | Age: 34
End: 2024-03-08

## 2024-03-13 NOTE — TELEPHONE ENCOUNTER
3/13 following up on POC. Can you please have a provider sign and fax to me at [phone]. Does not have to be a doctor. They sent a second request for POC. TY

## 2024-04-12 ENCOUNTER — OFFICE VISIT (OUTPATIENT)
Dept: GASTROENTEROLOGY | Facility: CLINIC | Age: 34
End: 2024-04-12
Payer: COMMERCIAL

## 2024-04-12 VITALS
HEIGHT: 68 IN | SYSTOLIC BLOOD PRESSURE: 115 MMHG | WEIGHT: 133.6 LBS | DIASTOLIC BLOOD PRESSURE: 72 MMHG | BODY MASS INDEX: 20.25 KG/M2

## 2024-04-12 DIAGNOSIS — K50.014 CROHN'S DISEASE OF SMALL INTESTINE WITH ABSCESS (HCC): Primary | ICD-10-CM

## 2024-04-12 PROCEDURE — 99213 OFFICE O/P EST LOW 20 MIN: CPT | Performed by: INTERNAL MEDICINE

## 2024-04-12 NOTE — PROGRESS NOTES
[facility] - Outpatient Follow-up Note  Victor Hugo Hurtado 33 y.o. male MRN: [id number]  Encounter: [id number]    ASSESSMENT AND PLAN:      1. Crohn's disease of small intestine with abscess (HCC)  Crohn's ileitis with abscess and stricture diagnosed November 2023.  Treated with antibiotics and Entyvio.  Seems to be getting better but not perfect.  Trying to avoid surgery if possible    -Continue Entyvio infusions  - CT abdomen pelvis w wo contrast; Future  - CBC  - Comprehensive metabolic panel  - Sedimentation rate, automated  - C-reactive protein  -If still having issues and has completed several courses of antibiotics and alone with Entyvio we will have him evaluated by surgery  -He will reach out to me if he gets worse from a pain, bleeding, or fever standpoint      Follow up appointment: Blood work and CAT scan in mid May.  Follow-up office visit 3 months    _______________________      Chief Complaint   Patient presents with    Follow up inufsions of Entyvio     Has had a few bloody stools this past week       HPI:   Patient is a 33 y.o. male with a significant PMH of Crohn's disease presenting for follow up.  Patient with a history of abdominal pain and change in bowel habits who ultimately was diagnosed with Crohn's ileitis with abscess in November 2023.  He was treated conservatively with antibiotics hoping to avoid surgery.  A colonoscopy showed a stricture ileum with biopsies consistent with Crohn's disease.  [facility] enterography revealed inflammatory small bowel Crohn's with luminal narrowing and stricturing and inflammatory mass.  He was treated with a course of antibiotics and ultimately was started on Entyvio.  He has completed his loading doses of Entyvio 4 weeks ago.  He reports that he was feeling relatively better.  He reports only minimal right lower quadrant abdominal pain.  He reports moving his bowels regularly.  He does admit that last week he did have  "some episodes of increasing right lower quadrant pain and had some bloody bowel movement.  He denies any upper GI symptoms.  Denies any fevers or chills.  His weight has been steadily gaining although he did report losing a few pounds over the last week.  He denies any extraintestinal manifestations of his Crohn's disease.  He denies any skin perianal abscesses or skin lesions    Historical Information   Past Medical History:   Diagnosis Date    Crohn's colitis (HCC)      Past Surgical History:   Procedure Laterality Date    COLONOSCOPY       Social History     Substance and Sexual Activity   Alcohol Use Never     Social History     Substance and Sexual Activity   Drug Use Yes    Types: Marijuana     Social History     Tobacco Use   Smoking Status Every Day    Current packs/day: 0.25    Types: Cigarettes   Smokeless Tobacco Never     Family History   Problem Relation Age of Onset    Colon cancer Neg Hx     Colon polyps Neg Hx          Current Outpatient Medications:     vedolizumab 300 mg in sodium chloride 0.9 % 250 mL  Allergies   Allergen Reactions    Ammonia Nasal Congestion     Reviewed medications and allergies and updated as indicated    PHYSICAL EXAM:    Blood pressure 115/72, height 5' 8\" (1.727 m), weight 60.6 kg (133 lb 9.6 oz). Body mass index is 20.31 kg/m².  General Appearance: NAD, cooperative, alert  Eyes: Anicteric  GI:  Soft, non-tender, non-distended; normal bowel sounds; no masses, no organomegaly   Rectal: Deferred  Musculoskeletal: No edema.  Skin:  No jaundice    Lab Results:   Lab Results   Component Value Date    WBC 8.23 11/24/2023    WBC 12.99 (H) 11/23/2023    WBC 11.03 (H) 11/22/2023    HGB 11.9 (L) 11/24/2023    HGB 11.4 (L) 11/23/2023    HGB 11.4 (L) 11/22/2023    MCV 84 11/24/2023     (H) 11/24/2023     (H) 11/23/2023     (H) 11/22/2023     Lab Results   Component Value Date    K 4.2 11/24/2023     11/24/2023    CO2 27 11/24/2023    BUN 13 11/24/2023    " CREATININE 0.72 11/24/2023    CALCIUM 9.4 11/24/2023    CORRECTEDCA 9.3 11/21/2023    AST 9 (L) 11/21/2023    AST 12 (L) 11/20/2023    AST 20 10/26/2023    ALT 8 11/21/2023    ALT 10 11/20/2023    ALT 24 10/26/2023    ALKPHOS 54 11/21/2023    ALKPHOS 57 11/20/2023    ALKPHOS 104 10/26/2023    EGFR 121 11/24/2023     Lab Results   Component Value Date    LIPASE 17 11/20/2023       Radiology Results:   No results found.

## 2024-04-12 NOTE — PATIENT INSTRUCTIONS
Continue Entyvio infusions.  Plan on repeating blood work and CAT scan in mid May.  Call me if you get worse from a pain, fever, or bleeding standpoint.  Follow-up office visit 3 months

## 2024-04-16 ENCOUNTER — TELEPHONE (OUTPATIENT)
Age: 34
End: 2024-04-16

## 2024-04-16 ENCOUNTER — HOSPITAL ENCOUNTER (INPATIENT)
Facility: HOSPITAL | Age: 34
LOS: 1 days | Discharge: LEFT BEFORE DISCHARGE | DRG: 386 | End: 2024-04-16
Attending: EMERGENCY MEDICINE | Admitting: HOSPITALIST
Payer: COMMERCIAL

## 2024-04-16 ENCOUNTER — APPOINTMENT (EMERGENCY)
Dept: CT IMAGING | Facility: HOSPITAL | Age: 34
DRG: 386 | End: 2024-04-16
Payer: COMMERCIAL

## 2024-04-16 VITALS
SYSTOLIC BLOOD PRESSURE: 122 MMHG | TEMPERATURE: 98 F | DIASTOLIC BLOOD PRESSURE: 78 MMHG | OXYGEN SATURATION: 99 % | WEIGHT: 133 LBS | HEART RATE: 93 BPM | RESPIRATION RATE: 18 BRPM | BODY MASS INDEX: 20.16 KG/M2 | HEIGHT: 68 IN

## 2024-04-16 DIAGNOSIS — R11.2 NAUSEA AND VOMITING: ICD-10-CM

## 2024-04-16 DIAGNOSIS — K50.014 CROHN'S DISEASE OF SMALL INTESTINE WITH ABSCESS (HCC): Primary | ICD-10-CM

## 2024-04-16 DIAGNOSIS — R10.9 ABDOMINAL PAIN: ICD-10-CM

## 2024-04-16 PROBLEM — D72.829 LEUKOCYTOSIS: Status: ACTIVE | Noted: 2024-04-16

## 2024-04-16 LAB
ALBUMIN SERPL BCP-MCNC: 4.2 G/DL (ref 3.5–5)
ALP SERPL-CCNC: 87 U/L (ref 34–104)
ALT SERPL W P-5'-P-CCNC: 18 U/L (ref 7–52)
ANION GAP SERPL CALCULATED.3IONS-SCNC: 6 MMOL/L (ref 4–13)
AST SERPL W P-5'-P-CCNC: 17 U/L (ref 13–39)
BASOPHILS # BLD AUTO: 0.09 THOUSANDS/ÂΜL (ref 0–0.1)
BASOPHILS NFR BLD AUTO: 1 % (ref 0–1)
BILIRUB SERPL-MCNC: 0.75 MG/DL (ref 0.2–1)
BILIRUB UR QL STRIP: NEGATIVE
BUN SERPL-MCNC: 11 MG/DL (ref 5–25)
CALCIUM SERPL-MCNC: 9.5 MG/DL (ref 8.4–10.2)
CHLORIDE SERPL-SCNC: 101 MMOL/L (ref 96–108)
CLARITY UR: CLEAR
CO2 SERPL-SCNC: 30 MMOL/L (ref 21–32)
COLOR UR: YELLOW
CREAT SERPL-MCNC: 0.81 MG/DL (ref 0.6–1.3)
CRP SERPL QL: 29.3 MG/L
EOSINOPHIL # BLD AUTO: 0.25 THOUSAND/ÂΜL (ref 0–0.61)
EOSINOPHIL NFR BLD AUTO: 2 % (ref 0–6)
ERYTHROCYTE [DISTWIDTH] IN BLOOD BY AUTOMATED COUNT: 14.9 % (ref 11.6–15.1)
ERYTHROCYTE [SEDIMENTATION RATE] IN BLOOD: 63 MM/HOUR (ref 0–14)
GFR SERPL CREATININE-BSD FRML MDRD: 115 ML/MIN/1.73SQ M
GLUCOSE SERPL-MCNC: 124 MG/DL (ref 65–140)
GLUCOSE UR STRIP-MCNC: NEGATIVE MG/DL
HCT VFR BLD AUTO: 43 % (ref 36.5–49.3)
HGB BLD-MCNC: 13.7 G/DL (ref 12–17)
HGB UR QL STRIP.AUTO: NEGATIVE
IMM GRANULOCYTES # BLD AUTO: 0.1 THOUSAND/UL (ref 0–0.2)
IMM GRANULOCYTES NFR BLD AUTO: 1 % (ref 0–2)
KETONES UR STRIP-MCNC: NEGATIVE MG/DL
LACTATE SERPL-SCNC: 0.8 MMOL/L (ref 0.5–2)
LEUKOCYTE ESTERASE UR QL STRIP: NEGATIVE
LIPASE SERPL-CCNC: 15 U/L (ref 11–82)
LYMPHOCYTES # BLD AUTO: 1.88 THOUSANDS/ÂΜL (ref 0.6–4.47)
LYMPHOCYTES NFR BLD AUTO: 12 % (ref 14–44)
MCH RBC QN AUTO: 26.3 PG (ref 26.8–34.3)
MCHC RBC AUTO-ENTMCNC: 31.9 G/DL (ref 31.4–37.4)
MCV RBC AUTO: 83 FL (ref 82–98)
MONOCYTES # BLD AUTO: 0.9 THOUSAND/ÂΜL (ref 0.17–1.22)
MONOCYTES NFR BLD AUTO: 6 % (ref 4–12)
NEUTROPHILS # BLD AUTO: 12.36 THOUSANDS/ÂΜL (ref 1.85–7.62)
NEUTS SEG NFR BLD AUTO: 78 % (ref 43–75)
NITRITE UR QL STRIP: NEGATIVE
NRBC BLD AUTO-RTO: 0 /100 WBCS
PH UR STRIP.AUTO: 7 [PH]
PLATELET # BLD AUTO: 415 THOUSANDS/UL (ref 149–390)
PMV BLD AUTO: 8.8 FL (ref 8.9–12.7)
POTASSIUM SERPL-SCNC: 4.3 MMOL/L (ref 3.5–5.3)
PROCALCITONIN SERPL-MCNC: <0.05 NG/ML
PROT SERPL-MCNC: 8.2 G/DL (ref 6.4–8.4)
PROT UR STRIP-MCNC: NEGATIVE MG/DL
RBC # BLD AUTO: 5.21 MILLION/UL (ref 3.88–5.62)
SODIUM SERPL-SCNC: 137 MMOL/L (ref 135–147)
SP GR UR STRIP.AUTO: <1.005 (ref 1–1.03)
UROBILINOGEN UR STRIP-ACNC: <2 MG/DL
WBC # BLD AUTO: 15.58 THOUSAND/UL (ref 4.31–10.16)

## 2024-04-16 PROCEDURE — 85025 COMPLETE CBC W/AUTO DIFF WBC: CPT | Performed by: PHYSICIAN ASSISTANT

## 2024-04-16 PROCEDURE — 99222 1ST HOSP IP/OBS MODERATE 55: CPT | Performed by: HOSPITALIST

## 2024-04-16 PROCEDURE — 99255 IP/OBS CONSLTJ NEW/EST HI 80: CPT | Performed by: PHYSICIAN ASSISTANT

## 2024-04-16 PROCEDURE — 86140 C-REACTIVE PROTEIN: CPT | Performed by: PHYSICIAN ASSISTANT

## 2024-04-16 PROCEDURE — 87040 BLOOD CULTURE FOR BACTERIA: CPT | Performed by: PHYSICIAN ASSISTANT

## 2024-04-16 PROCEDURE — 85652 RBC SED RATE AUTOMATED: CPT | Performed by: PHYSICIAN ASSISTANT

## 2024-04-16 PROCEDURE — 96360 HYDRATION IV INFUSION INIT: CPT

## 2024-04-16 PROCEDURE — 84145 PROCALCITONIN (PCT): CPT | Performed by: PHYSICIAN ASSISTANT

## 2024-04-16 PROCEDURE — 99284 EMERGENCY DEPT VISIT MOD MDM: CPT

## 2024-04-16 PROCEDURE — 81003 URINALYSIS AUTO W/O SCOPE: CPT | Performed by: PHYSICIAN ASSISTANT

## 2024-04-16 PROCEDURE — 36415 COLL VENOUS BLD VENIPUNCTURE: CPT | Performed by: PHYSICIAN ASSISTANT

## 2024-04-16 PROCEDURE — 80053 COMPREHEN METABOLIC PANEL: CPT | Performed by: PHYSICIAN ASSISTANT

## 2024-04-16 PROCEDURE — 83690 ASSAY OF LIPASE: CPT | Performed by: PHYSICIAN ASSISTANT

## 2024-04-16 PROCEDURE — 99235 HOSP IP/OBS SAME DATE MOD 70: CPT | Performed by: INTERNAL MEDICINE

## 2024-04-16 PROCEDURE — 83605 ASSAY OF LACTIC ACID: CPT | Performed by: PHYSICIAN ASSISTANT

## 2024-04-16 PROCEDURE — 74177 CT ABD & PELVIS W/CONTRAST: CPT

## 2024-04-16 PROCEDURE — 99285 EMERGENCY DEPT VISIT HI MDM: CPT | Performed by: PHYSICIAN ASSISTANT

## 2024-04-16 RX ORDER — ACETAMINOPHEN 325 MG/1
650 TABLET ORAL EVERY 6 HOURS PRN
Status: DISCONTINUED | OUTPATIENT
Start: 2024-04-16 | End: 2024-04-16 | Stop reason: HOSPADM

## 2024-04-16 RX ORDER — SODIUM CHLORIDE 9 MG/ML
100 INJECTION, SOLUTION INTRAVENOUS CONTINUOUS
Status: DISCONTINUED | OUTPATIENT
Start: 2024-04-16 | End: 2024-04-16 | Stop reason: HOSPADM

## 2024-04-16 RX ORDER — NICOTINE 21 MG/24HR
1 PATCH, TRANSDERMAL 24 HOURS TRANSDERMAL DAILY
Status: DISCONTINUED | OUTPATIENT
Start: 2024-04-16 | End: 2024-04-16 | Stop reason: HOSPADM

## 2024-04-16 RX ORDER — CIPROFLOXACIN 500 MG/1
500 TABLET, FILM COATED ORAL EVERY 12 HOURS SCHEDULED
Qty: 14 TABLET | Refills: 0 | Status: SHIPPED | OUTPATIENT
Start: 2024-04-16 | End: 2024-04-23

## 2024-04-16 RX ORDER — ENOXAPARIN SODIUM 100 MG/ML
40 INJECTION SUBCUTANEOUS DAILY
Status: DISCONTINUED | OUTPATIENT
Start: 2024-04-17 | End: 2024-04-16 | Stop reason: HOSPADM

## 2024-04-16 RX ORDER — METRONIDAZOLE 500 MG/1
500 TABLET ORAL EVERY 8 HOURS SCHEDULED
Qty: 21 TABLET | Refills: 0 | Status: SHIPPED | OUTPATIENT
Start: 2024-04-16 | End: 2024-04-23

## 2024-04-16 RX ORDER — SODIUM CHLORIDE 9 MG/ML
150 INJECTION, SOLUTION INTRAVENOUS CONTINUOUS
Status: DISCONTINUED | OUTPATIENT
Start: 2024-04-16 | End: 2024-04-16

## 2024-04-16 RX ADMIN — IOHEXOL 100 ML: 350 INJECTION, SOLUTION INTRAVENOUS at 11:11

## 2024-04-16 RX ADMIN — SODIUM CHLORIDE 1000 ML: 0.9 INJECTION, SOLUTION INTRAVENOUS at 09:35

## 2024-04-16 RX ADMIN — SODIUM CHLORIDE 150 ML/HR: 0.9 INJECTION, SOLUTION INTRAVENOUS at 13:36

## 2024-04-16 RX ADMIN — NICOTINE 1 PATCH: 14 PATCH, EXTENDED RELEASE TRANSDERMAL at 17:02

## 2024-04-16 RX ADMIN — PIPERACILLIN SODIUM AND TAZOBACTAM SODIUM 4.5 G: 4; .5 INJECTION, POWDER, LYOPHILIZED, FOR SOLUTION INTRAVENOUS at 13:35

## 2024-04-16 RX ADMIN — PIPERACILLIN SODIUM AND TAZOBACTAM SODIUM 4.5 G: 4; .5 INJECTION, POWDER, LYOPHILIZED, FOR SOLUTION INTRAVENOUS at 17:03

## 2024-04-16 NOTE — ASSESSMENT & PLAN NOTE
Left lower lobe lung nodule is unchanged since October 2023. This could be followed at the time of the patient's follow-up evaluation for exacerbation of Crohn's disease.     Known to pt, will need ongoing follow up.

## 2024-04-16 NOTE — TELEPHONE ENCOUNTER
Patients GI provider:  Dr. Corona    Number to return call: [phone]    Reason for call: Pt called in requesting to speak to a nurse or Dr. Corona. Pt stated that he was told by Dr. Corona if he had his symptoms again to give him a call. Pt stated that he is going to the ED as he is vomiting and has the sweats.     Scheduled procedure/appointment date if applicable: Appt  8/12/24

## 2024-04-16 NOTE — CONSULTS
Consultation - General Surgery   Victor Hugo Hurtado 33 y.o. male MRN: [id number]  Unit/Bed#: -01 Encounter: [id number]    Assessment/Plan     Crohn's disease of small intestine  - Abdominal pain intermittent since last admission, mostly after eating with associated bloating. Improves if he is able to pass flatus.   -Follow with Chloe in GI- currently being treated with Entyvio  -CT abd/pelvis:1. Interval progression of severe inflammatory changes involving ileal loops secondary to underlying Crohn's disease with more proximal extension, increasing wall thickening and edema as well as interval progression of interloop inflammatory mass.  2. Dilatation of small bowel loops with chronic stasis and stricture involving the distal ileum.  3. Sigmoid wall thickening which could be related to Crohn's colitis with matted inflamed small bowel and sigmoid loops in the pelvis in association with inflammatory mass described above. No definite air containing fistulous tract is visualized but the   patient is at high risk for developing a fistula.  4. Left lower lobe lung nodule is unchanged since October 2023. This could be followed at the time of the patient's follow-up evaluation for exacerbation of Crohn's disease.  -WBC:15, hgb 13.7  -LA, procal wnl  -afebrile  -Tender RLQ, no rebound or peritoneal signs      -Discussed with Dr Ramos, GI recommending surgery as he has failed conservative management. Patient will likely need partial small bowel resection and possibly right hemicolectomy with possibility of diverting ileostomy temporarily. Timing is uncertain, but will discuss with team.    -Discussed above recommendations from Dr Ramos with patient. He states he is considering signing out against medical advice and getting another opinion from a specialist in [facility] before getting surgery now. After discussing possible surgical plan, he stated he really just came to see if he could get recommendations on  something to help calm his stomach like Maalox or Pepto. Discussed with him they would not improve his underlying Crohn's and inflammation causing his symptoms and he has failed outpatient management with medication per GI. Patient is considering options but says he is not ready for surgery. Discussed risks of leaving AMA and delaying intervention, including worsening of condition, development of fistula, perforation, sepsis. He is going to consider and let the team know his decision tonight. Updated Dr Ramos.    -Per KOSTAS he is signing paperwork to leave AMA    History of Present Illness     HPI:  Victor Hugo Hurtado is a 33 y.o. male with Crohn's ileitis with hx of abscess diagnosed 2023. He has been hospitalized twice since and is following with Dr Corona in GI. Treatment has involved steroids, antibiotics, and now Entyvio.  He reports some improvement since admission in Nov 2023, but has had intermittent RLQ pain and bloating after eating until he is able to pass flatus and it improves. He states pain usually at its worst in the early morning hours. He also reports intermittent blood on toilet tissue, but no lizzie blood in stool. Vomited today due to pain and had sweats as well. Denies fever, current nausea/vomiting, or other recent changes in his health. Patient states he came to hospital for answers and is frustrated with feeling like he isn't getting a plan.    Inpatient consult to Acute Care Surgery  Consult performed by: Sue Bonner PA-C  Consult ordered by: Sylvia Jhaveri PA-C        Review of Systems   Constitutional:  Positive for diaphoresis. Negative for fever.   Respiratory:  Negative for shortness of breath.    Cardiovascular:  Negative for chest pain.   Gastrointestinal:  Positive for abdominal distention, abdominal pain, anal bleeding, nausea and vomiting.   Genitourinary:  Negative for difficulty urinating.       Historical Information   Past Medical History:   Diagnosis Date     "Crohn's colitis (HCC)      Past Surgical History:   Procedure Laterality Date    COLONOSCOPY       Social History   Social History     Substance and Sexual Activity   Alcohol Use Not Currently     Social History     Substance and Sexual Activity   Drug Use Yes    Types: Marijuana    Comment: states 2 ounces/month, medical card     E-Cigarette/Vaping    E-Cigarette Use Never User      E-Cigarette/Vaping Substances     Social History     Tobacco Use   Smoking Status Every Day    Current packs/day: 0.50    Types: Cigarettes   Smokeless Tobacco Never     Family History:   Family History   Problem Relation Age of Onset    Colon cancer Neg Hx     Colon polyps Neg Hx        Meds/Allergies   PTA meds:   Prior to Admission Medications   Prescriptions Last Dose Informant Patient Reported? Taking?   vedolizumab 300 mg in sodium chloride 0.9 % 250 mL  Self Yes No   Sig: Inject 300 mg into a catheter in a vein once      Facility-Administered Medications: None     Allergies   Allergen Reactions    Ammonia Nasal Congestion       Objective   First Vitals:   Blood Pressure: 115/60 (04/16/24 0906)  Pulse: 93 (04/16/24 0906)  Temperature: 98 °F (36.7 °C) (04/16/24 0906)  Temp Source: Oral (04/16/24 0906)  Respirations: 18 (04/16/24 0906)  Height: 5' 8\" (172.7 cm) (04/16/24 0906)  Weight - Scale: 60.3 kg (133 lb) (04/16/24 0906)  SpO2: 99 % (04/16/24 0906)    Current Vitals:   Blood Pressure: 122/78 (04/16/24 1733)  Pulse: 93 (04/16/24 0906)  Temperature: 98 °F (36.7 °C) (04/16/24 0906)  Temp Source: Oral (04/16/24 0906)  Respirations: 18 (04/16/24 0906)  Height: 5' 8\" (172.7 cm) (04/16/24 0906)  Weight - Scale: 60.3 kg (133 lb) (04/16/24 0906)  SpO2: 99 % (04/16/24 0906)      Intake/Output Summary (Last 24 hours) at 4/16/2024 1738  Last data filed at 4/16/2024 1035  Gross per 24 hour   Intake 1000 ml   Output --   Net 1000 ml       Invasive Devices       Peripheral Intravenous Line  Duration             Peripheral IV 04/16/24 " Distal;Right;Upper;Ventral (anterior) Arm <1 day                    Physical Exam  Vitals reviewed.   Constitutional:       General: He is not in acute distress.     Appearance: Normal appearance. He is not ill-appearing, toxic-appearing or diaphoretic.   HENT:      Head: Normocephalic.      Mouth/Throat:      Mouth: Mucous membranes are moist.   Eyes:      Conjunctiva/sclera: Conjunctivae normal.   Cardiovascular:      Rate and Rhythm: Normal rate and regular rhythm.      Heart sounds: Normal heart sounds.   Pulmonary:      Effort: Pulmonary effort is normal.      Breath sounds: Normal breath sounds.   Abdominal:      General: Abdomen is flat. There is no distension.      Palpations: Abdomen is soft.      Tenderness: There is abdominal tenderness (RLQ). There is no guarding or rebound.   Skin:     General: Skin is warm and dry.   Neurological:      General: No focal deficit present.      Mental Status: He is alert and oriented to person, place, and time.   Psychiatric:         Mood and Affect: Mood normal.         Thought Content: Thought content normal.         Lab Results: I have personally reviewed pertinent lab results.  , CBC:   Lab Results   Component Value Date    WBC 15.58 (H) 04/16/2024    HGB 13.7 04/16/2024    HCT 43.0 04/16/2024    MCV 83 04/16/2024     (H) 04/16/2024    RBC 5.21 04/16/2024    MCH 26.3 (L) 04/16/2024    MCHC 31.9 04/16/2024    RDW 14.9 04/16/2024    MPV 8.8 (L) 04/16/2024    NRBC 0 04/16/2024   , CMP:   Lab Results   Component Value Date    SODIUM 137 04/16/2024    K 4.3 04/16/2024     04/16/2024    CO2 30 04/16/2024    BUN 11 04/16/2024    CREATININE 0.81 04/16/2024    CALCIUM 9.5 04/16/2024    AST 17 04/16/2024    ALT 18 04/16/2024    ALKPHOS 87 04/16/2024    EGFR 115 04/16/2024   , Urinalysis:   Lab Results   Component Value Date    COLORU Yellow 04/16/2024    CLARITYU Clear 04/16/2024    SPECGRAV <1.005 (L) 04/16/2024    PHUR 7.0 04/16/2024    LEUKOCYTESUR Negative  04/16/2024    NITRITE Negative 04/16/2024    GLUCOSEU Negative 04/16/2024    KETONESU Negative 04/16/2024    BILIRUBINUR Negative 04/16/2024    BLOODU Negative 04/16/2024   , Lipase:   Lab Results   Component Value Date    LIPASE 15 04/16/2024     Imaging: I have personally reviewed pertinent reports.    EKG, Pathology, and Other Studies: I have personally reviewed pertinent reports.           no

## 2024-04-16 NOTE — PROGRESS NOTES
Report called to [place]. Pt transported to [facility] with PCA. Belongings with pt.

## 2024-04-16 NOTE — PLAN OF CARE
Problem: GASTROINTESTINAL - ADULT  Goal: Minimal or absence of nausea and/or vomiting  Description: INTERVENTIONS:  - Administer IV fluids if ordered to ensure adequate hydration  - Maintain NPO status until nausea and vomiting are resolved  - Nasogastric tube if ordered  - Administer ordered antiemetic medications as needed  - Provide nonpharmacologic comfort measures as appropriate  - Advance diet as tolerated, if ordered  - Consider nutrition services referral to assist patient with adequate nutrition and appropriate food choices  Outcome: Progressing  Goal: Maintains or returns to baseline bowel function  Description: INTERVENTIONS:  - Assess bowel function  - Encourage oral fluids to ensure adequate hydration  - Administer IV fluids if ordered to ensure adequate hydration  - Administer ordered medications as needed  - Encourage mobilization and activity  - Consider nutritional services referral to assist patient with adequate nutrition and appropriate food choices  Outcome: Progressing  Goal: Maintains adequate nutritional intake  Description: INTERVENTIONS:  - Monitor percentage of each meal consumed  - Identify factors contributing to decreased intake, treat as appropriate  - Assist with meals as needed  - Monitor I&O, weight, and lab values if indicated  - Obtain nutrition services referral as needed  Outcome: Progressing     Problem: METABOLIC, FLUID AND ELECTROLYTES - ADULT  Goal: Electrolytes maintained within normal limits  Description: INTERVENTIONS:  - Monitor labs and assess patient for signs and symptoms of electrolyte imbalances  - Administer electrolyte replacement as ordered  - Monitor response to electrolyte replacements, including repeat lab results as appropriate  - Instruct patient on fluid and nutrition as appropriate  Outcome: Progressing  Goal: Fluid balance maintained  Description: INTERVENTIONS:  - Monitor labs   - Monitor I/O and WT  - Instruct patient on fluid and nutrition as  appropriate  - Assess for signs & symptoms of volume excess or deficit  Outcome: Progressing  Goal: Glucose maintained within target range  Description: INTERVENTIONS:  - Monitor Blood Glucose as ordered  - Assess for signs and symptoms of hyperglycemia and hypoglycemia  - Administer ordered medications to maintain glucose within target range  - Assess nutritional intake and initiate nutrition service referral as needed  Outcome: Progressing     Problem: MUSCULOSKELETAL - ADULT  Goal: Maintain or return mobility to safest level of function  Description: INTERVENTIONS:  - Assess patient's ability to carry out ADLs; assess patient's baseline for ADL function and identify physical deficits which impact ability to perform ADLs (bathing, care of mouth/teeth, toileting, grooming, dressing, etc.)  - Assess/evaluate cause of self-care deficits   - Assess range of motion  - Assess patient's mobility  - Assess patient's need for assistive devices and provide as appropriate  - Encourage maximum independence but intervene and supervise when necessary  - Involve family in performance of ADLs  - Assess for home care needs following discharge   - Consider OT consult to assist with ADL evaluation and planning for discharge  - Provide patient education as appropriate  Outcome: Progressing  Goal: Maintain proper alignment of affected body part  Description: INTERVENTIONS:  - Support, maintain and protect limb and body alignment  - Provide patient/ family with appropriate education  Outcome: Progressing     Problem: MOBILITY - ADULT  Goal: Maintain or return to baseline ADL function  Description: INTERVENTIONS:  -  Assess patient's ability to carry out ADLs; assess patient's baseline for ADL function and identify physical deficits which impact ability to perform ADLs (bathing, care of mouth/teeth, toileting, grooming, dressing, etc.)  - Assess/evaluate cause of self-care deficits   - Assess range of motion  - Assess patient's  mobility; develop plan if impaired  - Assess patient's need for assistive devices and provide as appropriate  - Encourage maximum independence but intervene and supervise when necessary  - Involve family in performance of ADLs  - Assess for home care needs following discharge   - Consider OT consult to assist with ADL evaluation and planning for discharge  - Provide patient education as appropriate  Outcome: Progressing  Goal: Maintains/Returns to pre admission functional level  Description: INTERVENTIONS:  - Perform AM-PAC 6 Click Basic Mobility/ Daily Activity assessment daily.  - Set and communicate daily mobility goal to care team and patient/family/caregiver.   - Collaborate with rehabilitation services on mobility goals if consulted  - Perform Range of Motion x times a day.  - Reposition patient every x hours.  - Dangle patient x times a day  - Stand patient x times a day  - Ambulate patient x times a day  - Out of bed to chair x times a day   - Out of bed for meals x times a day  - Out of bed for toileting  - Record patient progress and toleration of activity level   Outcome: Progressing     Problem: PAIN - ADULT  Goal: Verbalizes/displays adequate comfort level or baseline comfort level  Description: Interventions:  - Encourage patient to monitor pain and request assistance  - Assess pain using appropriate pain scale  - Administer analgesics based on type and severity of pain and evaluate response  - Implement non-pharmacological measures as appropriate and evaluate response  - Consider cultural and social influences on pain and pain management  - Notify physician/advanced practitioner if interventions unsuccessful or patient reports new pain  Outcome: Progressing     Problem: INFECTION - ADULT  Goal: Absence or prevention of progression during hospitalization  Description: INTERVENTIONS:  - Assess and monitor for signs and symptoms of infection  - Monitor lab/diagnostic results  - Monitor all insertion  sites, i.e. indwelling lines, tubes, and drains  - Monitor endotracheal if appropriate and nasal secretions for changes in amount and color  - [place] appropriate cooling/warming therapies per order  - Administer medications as ordered  - Instruct and encourage patient and family to use good hand hygiene technique  - Identify and instruct in appropriate isolation precautions for identified infection/condition  Outcome: Progressing     Problem: SAFETY ADULT  Goal: Maintain or return to baseline ADL function  Description: INTERVENTIONS:  -  Assess patient's ability to carry out ADLs; assess patient's baseline for ADL function and identify physical deficits which impact ability to perform ADLs (bathing, care of mouth/teeth, toileting, grooming, dressing, etc.)  - Assess/evaluate cause of self-care deficits   - Assess range of motion  - Assess patient's mobility; develop plan if impaired  - Assess patient's need for assistive devices and provide as appropriate  - Encourage maximum independence but intervene and supervise when necessary  - Involve family in performance of ADLs  - Assess for home care needs following discharge   - Consider OT consult to assist with ADL evaluation and planning for discharge  - Provide patient education as appropriate  Outcome: Progressing  Goal: Maintains/Returns to pre admission functional level  Description: INTERVENTIONS:  - Perform AM-PAC 6 Click Basic Mobility/ Daily Activity assessment daily.  - Set and communicate daily mobility goal to care team and patient/family/caregiver.   - Collaborate with rehabilitation services on mobility goals if consulted  - Perform Range of Motion x times a day.  - Reposition patient every x hours.  - Dangle patient x times a day  - Stand patient x times a day  - Ambulate patient x times a day  - Out of bed to chair x times a day   - Out of bed for meals x times a day  - Out of bed for toileting  - Record patient progress and toleration of activity  level   Outcome: Progressing  Goal: Patient will remain free of falls  Description: INTERVENTIONS:  - Educate patient/family on patient safety including physical limitations  - Instruct patient to call for assistance with activity   - Consult OT/PT to assist with strengthening/mobility   - Keep Call bell within reach  - Keep bed low and locked with side rails adjusted as appropriate  - Keep care items and personal belongings within reach  - Initiate and maintain comfort rounds  - Make Fall Risk Sign visible to staff  - Offer Toileting every x Hours, in advance of need  - Initiate/Maintain xalarm  - Obtain necessary fall risk management equipment: xxx  - Apply yellow socks and bracelet for high fall risk patients  - Consider moving patient to room near nurses station  Outcome: Progressing     Problem: DISCHARGE PLANNING  Goal: Discharge to home or other facility with appropriate resources  Description: INTERVENTIONS:  - Identify barriers to discharge w/patient and caregiver  - Arrange for needed discharge resources and transportation as appropriate  - Identify discharge learning needs (meds, wound care, etc.)  - Arrange for interpretive services to assist at discharge as needed  - Refer to Case Management Department for coordinating discharge planning if the patient needs post-hospital services based on physician/advanced practitioner order or complex needs related to functional status, cognitive ability, or social support system  Outcome: Progressing

## 2024-04-16 NOTE — ED PROVIDER NOTES
History  Chief Complaint   Patient presents with    Abdominal Pain     Patient presents to the ER with RLQ abdominal pain.     Patient is a 32 y/o M with h/o Crohns disease that presents to the ED with worsening abdominal pain, nausea, vomiting.  Patient was diagnosed with abscess in November, started on Entyvio and abx and improved slightly, but over the past couple days symptoms have been worsening.  He denies fevers.  He has generalized weakness.  He states he has had blood in stool.       History provided by:  Patient  Abdominal Pain  Associated symptoms: chills, diarrhea, fatigue and nausea    Associated symptoms: no cough, no fever, no shortness of breath and no vomiting        Prior to Admission Medications   Prescriptions Last Dose Informant Patient Reported? Taking?   vedolizumab 300 mg in sodium chloride 0.9 % 250 mL  Self Yes No   Sig: Inject 300 mg into a catheter in a vein once      Facility-Administered Medications: None       Past Medical History:   Diagnosis Date    Crohn's colitis (HCC)        Past Surgical History:   Procedure Laterality Date    COLONOSCOPY         Family History   Problem Relation Age of Onset    Colon cancer Neg Hx     Colon polyps Neg Hx      I have reviewed and agree with the history as documented.    E-Cigarette/Vaping    E-Cigarette Use Never User      E-Cigarette/Vaping Substances     Social History     Tobacco Use    Smoking status: Every Day     Current packs/day: 0.25     Types: Cigarettes    Smokeless tobacco: Never   Vaping Use    Vaping status: Never Used   Substance Use Topics    Alcohol use: Never    Drug use: Yes     Types: Marijuana       Review of Systems   Constitutional:  Positive for chills and fatigue. Negative for fever.   HENT: Negative.     Respiratory:  Negative for cough and shortness of breath.    Gastrointestinal:  Positive for abdominal pain, blood in stool, diarrhea and nausea. Negative for vomiting.   Musculoskeletal:  Negative for back pain and neck  pain.   Skin:  Positive for pallor. Negative for color change and rash.   Neurological:  Positive for weakness (generalized).   Psychiatric/Behavioral:  Negative for confusion.    All other systems reviewed and are negative.      Physical Exam  Physical Exam  Vitals and nursing note reviewed.   Constitutional:       General: He is not in acute distress.     Appearance: He is well-developed, well-groomed and normal weight. He is ill-appearing.   HENT:      Head: Normocephalic and atraumatic.      Nose: Nose normal.      Mouth/Throat:      Mouth: Mucous membranes are moist.   Eyes:      Conjunctiva/sclera: Conjunctivae normal.   Cardiovascular:      Rate and Rhythm: Normal rate and regular rhythm.      Heart sounds: Normal heart sounds.   Pulmonary:      Effort: Pulmonary effort is normal.      Breath sounds: Normal breath sounds. No wheezing, rhonchi or rales.   Abdominal:      General: Abdomen is flat. Bowel sounds are increased.      Palpations: Abdomen is soft.      Tenderness: There is generalized abdominal tenderness. There is no guarding or rebound.   Musculoskeletal:         General: Normal range of motion.      Cervical back: Normal range of motion.   Skin:     General: Skin is warm and dry.      Coloration: Skin is not jaundiced or pale.      Findings: No rash.   Neurological:      General: No focal deficit present.      Mental Status: He is alert and oriented to person, place, and time.      Motor: No weakness.   Psychiatric:         Mood and Affect: Mood normal.         Behavior: Behavior is cooperative.         Vital Signs  ED Triage Vitals [04/16/24 0906]   Temperature Pulse Respirations Blood Pressure SpO2   98 °F (36.7 °C) 93 18 115/60 99 %      Temp Source Heart Rate Source Patient Position - Orthostatic VS BP Location FiO2 (%)   Oral Monitor Sitting Right arm --      Pain Score       8           Vitals:    04/16/24 0906   BP: 115/60   Pulse: 93   Patient Position - Orthostatic VS: Sitting          Visual Acuity      ED Medications  Medications   piperacillin-tazobactam (ZOSYN) IVPB 4.5 g (has no administration in time range)   sodium chloride 0.9 % infusion (has no administration in time range)   sodium chloride 0.9 % bolus 1,000 mL (0 mL Intravenous Stopped 4/16/24 1035)   iohexol (OMNIPAQUE) 350 MG/ML injection (MULTI-DOSE) 100 mL (100 mL Intravenous Given 4/16/24 1111)       Diagnostic Studies  Results Reviewed       Procedure Component Value Units Date/Time    C-reactive protein [[id number]]  (Abnormal) Collected: 04/16/24 0929    Lab Status: Final result Specimen: Blood from Arm, Right Updated: 04/16/24 1116     CRP 29.3 mg/L     Comprehensive metabolic panel [[id number]] Collected: 04/16/24 0929    Lab Status: Final result Specimen: Blood from Arm, Right Updated: 04/16/24 1100     Sodium 137 mmol/L      Potassium 4.3 mmol/L      Chloride 101 mmol/L      CO2 30 mmol/L      ANION GAP 6 mmol/L      BUN 11 mg/dL      Creatinine 0.81 mg/dL      Glucose 124 mg/dL      Calcium 9.5 mg/dL      AST 17 U/L      ALT 18 U/L      Alkaline Phosphatase 87 U/L      Total Protein 8.2 g/dL      Albumin 4.2 g/dL      Total Bilirubin 0.75 mg/dL      eGFR 115 ml/min/1.73sq m     Narrative:      [employer] guidelines for Chronic Kidney Disease (CKD):     Stage 1 with normal or high GFR (GFR > 90 mL/min/1.73 square meters)    Stage 2 Mild CKD (GFR = 60-89 mL/min/1.73 square meters)    Stage 3A Moderate CKD (GFR = 45-59 mL/min/1.73 square meters)    Stage 3B Moderate CKD (GFR = 30-44 mL/min/1.73 square meters)    Stage 4 Severe CKD (GFR = 15-29 mL/min/1.73 square meters)    Stage 5 End Stage CKD (GFR <15 mL/min/1.73 square meters)  Note: GFR calculation is accurate only with a steady state creatinine    Lipase [[id number]]  (Normal) Collected: 04/16/24 0929    Lab Status: Final result Specimen: Blood from Arm, Right Updated: 04/16/24 1100     Lipase 15 u/L     Sedimentation rate, automated  [[id number]]  (Abnormal) Collected: 04/16/24 0929    Lab Status: Final result Specimen: Blood from Arm, Left Updated: 04/16/24 1014     Sed Rate 63 mm/hour     Procalcitonin [[id number]]  (Normal) Collected: 04/16/24 0929    Lab Status: Final result Specimen: Blood from Arm, Left Updated: 04/16/24 1002     Procalcitonin <0.05 ng/ml     Lactic acid, plasma (w/reflex if result > 2.0) [[id number]]  (Normal) Collected: 04/16/24 0929    Lab Status: Final result Specimen: Blood from Arm, Left Updated: 04/16/24 0955     LACTIC ACID 0.8 mmol/L     Narrative:      Result may be elevated if tourniquet was used during collection.    UA w Reflex to Microscopic w Reflex to Culture [[id number]]  (Abnormal) Collected: 04/16/24 0936    Lab Status: Final result Specimen: Urine, Clean Catch Updated: 04/16/24 0949     Color, UA Yellow     Clarity, UA Clear     Specific Gravity, UA <1.005     pH, UA 7.0     Leukocytes, UA Negative     Nitrite, UA Negative     Protein, UA Negative mg/dl      Glucose, UA Negative mg/dl      Ketones, UA Negative mg/dl      Urobilinogen, UA <2.0 mg/dl      Bilirubin, UA Negative     Occult Blood, UA Negative    Blood culture #1 [[id number]] Collected: 04/16/24 0929    Lab Status: In process Specimen: Blood from Arm, Left Updated: 04/16/24 0945    Blood culture #2 [[id number]] Collected: 04/16/24 0929    Lab Status: In process Specimen: Blood from Arm, Left Updated: 04/16/24 0945    CBC and differential [[id number]]  (Abnormal) Collected: 04/16/24 0929    Lab Status: Final result Specimen: Blood from Arm, Left Updated: 04/16/24 0938     WBC 15.58 Thousand/uL      RBC 5.21 Million/uL      Hemoglobin 13.7 g/dL      Hematocrit 43.0 %      MCV 83 fL      MCH 26.3 pg      MCHC 31.9 g/dL      RDW 14.9 %      MPV 8.8 fL      Platelets 415 Thousands/uL      nRBC 0 /100 WBCs      Segmented % 78 %      Immature Grans % 1 %      Lymphocytes % 12 %      Monocytes % 6 %      Eosinophils Relative 2 %      Basophils Relative 1 %       Absolute Neutrophils 12.36 Thousands/µL      Absolute Immature Grans 0.10 Thousand/uL      Absolute Lymphocytes 1.88 Thousands/µL      Absolute Monocytes 0.90 Thousand/µL      Eosinophils Absolute 0.25 Thousand/µL      Basophils Absolute 0.09 Thousands/µL                    CT abdomen pelvis with contrast   Final Result by Sadie Ordonez MD (04/16 1143)         1. Interval progression of severe inflammatory changes involving ileal loops secondary to underlying Crohn's disease with more proximal extension, increasing wall thickening and edema as well as interval progression of interloop inflammatory mass.   2. Dilatation of small bowel loops with chronic stasis and stricture involving the distal ileum.   3. Sigmoid wall thickening which could be related to Crohn's colitis with matted inflamed small bowel and sigmoid loops in the pelvis in association with inflammatory mass described above. No definite air containing fistulous tract is visualized but the    patient is at high risk for developing a fistula.   4. Left lower lobe lung nodule is unchanged since October 2023. This could be followed at the time of the patient's follow-up evaluation for exacerbation of Crohn's disease.   The study was marked in EPIC for immediate notification.         Workstation performed: [id number]                    Procedures  Procedures         ED Course  ED Course as of 04/16/24 1301   Tue Apr 16, 2024   1201 GI paged via TT concerning CT results.                                SBIRT 20yo+      Flowsheet Row Most Recent Value   Initial Alcohol Screen: US AUDIT-C     1. How often do you have a drink containing alcohol? 0 Filed at: 04/16/2024 0909   Audit-C Score 0 Filed at: 04/16/2024 0909   DHARA: How many times in the past year have you...    Used an illegal drug or used a prescription medication for non-medical reasons? Never Filed at: 04/16/2024 0909                      Medical Decision Making  Patient with h/o crohns  disease, with worsening pain and N/V, will order labs, CT scan to r/o obstruction, fistula, mass.    Patient with mass on CT scan, colitis, no obstruction, will admit for IV abx and hydration and further evaluation by GI.     Amount and/or Complexity of Data Reviewed  External Data Reviewed: labs, radiology and notes.  Labs: ordered.  Radiology: ordered.  Discussion of management or test interpretation with external provider(s): D/w GI    Risk  Prescription drug management.  Decision regarding hospitalization.             Disposition  Final diagnoses:   Crohn's disease of small intestine with abscess (HCC)   Abdominal pain   Nausea and vomiting     Time reflects when diagnosis was documented in both MDM as applicable and the Disposition within this note       Time User Action Codes Description Comment    4/16/2024 11:59 AM Fang Horton [K50.014] Crohn's disease of small intestine with abscess (HCC)     4/16/2024 11:59 AM Fang Horton [R10.9] Abdominal pain     4/16/2024 11:59 AM Fang Horton [R11.2] Nausea and vomiting           ED Disposition       ED Disposition   Admit    Condition   Stable    Date/Time   Tue Apr 16, 2024 1226    Comment   Case was discussed with Dr. Gooden and the patient's admission status was agreed to be Admission Status: inpatient status to the service of Dr. Gooden .               Follow-up Information    None         Patient's Medications   Discharge Prescriptions    No medications on file       No discharge procedures on file.    PDMP Review         Value Time User    PDMP Reviewed  Yes 10/27/2023 12:49 PM Nikkie Moreno PA-C            ED Provider  Electronically Signed by             Fang Horton PA-C  04/16/24 8043

## 2024-04-16 NOTE — ASSESSMENT & PLAN NOTE
Patient presents with right lower quadrant abdominal pain, decreased ability to take it diet, nausea, sweats.  CT abdomen pelvis:  1. Interval progression of severe inflammatory changes involving ileal loops secondary to underlying Crohn's disease with more proximal extension, increasing wall thickening and edema as well as interval progression of interloop inflammatory mass.   2. Dilatation of small bowel loops with chronic stasis and stricture involving the distal ileum.   3. Sigmoid wall thickening which could be related to Crohn's colitis with matted inflamed small bowel and sigmoid loops in the pelvis in association with inflammatory mass described above. No definite air containing fistulous tract is visualized but the   patient is at high risk for developing a fistula.   4. Left lower lobe lung nodule is unchanged since October 2023. This could be followed at the time of the patient's follow-up evaluation for exacerbation of Crohn's disease.     IV fluids  Pain control  Antiemetics as required  Consult GI services. Discussed w/ Dr. Corona.  Cont zosyn.  Liquid diet for now.
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
## 2024-04-16 NOTE — H&P
[facility]  H&P  Name: Victor Hugo Hurtado 33 y.o. male I MRN: [id number]  Unit/Bed#: ED 10 I Date of Admission: 4/16/2024   Date of Service: 4/16/2024 I Hospital Day: 0      Assessment/Plan   Leukocytosis  Assessment & Plan  Noted, may be reactive  Received Zosyn in ER  Further abx if indicated by GI.     Lung nodule  Assessment & Plan  Left lower lobe lung nodule is unchanged since October 2023. This could be followed at the time of the patient's follow-up evaluation for exacerbation of Crohn's disease.     Known to pt, will need ongoing follow up.     Cigarette nicotine dependence without complication  Assessment & Plan  NRT    * Crohn's disease of small intestine with abscess (HCC)  Assessment & Plan  Patient presents with right lower quadrant abdominal pain, decreased ability to take it diet, nausea, sweats.  CT abdomen pelvis:  1. Interval progression of severe inflammatory changes involving ileal loops secondary to underlying Crohn's disease with more proximal extension, increasing wall thickening and edema as well as interval progression of interloop inflammatory mass.   2. Dilatation of small bowel loops with chronic stasis and stricture involving the distal ileum.   3. Sigmoid wall thickening which could be related to Crohn's colitis with matted inflamed small bowel and sigmoid loops in the pelvis in association with inflammatory mass described above. No definite air containing fistulous tract is visualized but the   patient is at high risk for developing a fistula.   4. Left lower lobe lung nodule is unchanged since October 2023. This could be followed at the time of the patient's follow-up evaluation for exacerbation of Crohn's disease.     IV fluids  Pain control  Antiemetics as required  Consult GI services. Discussed w/ Dr. Corona.  Cont zosyn.  Liquid diet for now.             Chief Complaint   Patient presents with    Abdominal Pain     Patient presents to the  ER with RLQ abdominal pain.        HPI:  Victor Hugo Hurtado is a 33 y.o. male who presents with GI symptoms.  Patient states he has been having progressive abdominal pain particular in the right lower quadrant.  This has been getting worse over the past few days.  He endorses chills at home.  He is having limited oral intake.  He has nausea and an episode of emesis prior to admission.  This was non bloody nonbilious.  Patient does have a known history of Crohn's disease on monoclonal ab but has not had marked improvement.  Patient is no chest pain.  No shortness of breath.  No dysuria.  No sick contacts.  No other complaints.    Historical Information   Past Medical History:   Diagnosis Date    Crohn's colitis (HCC)      Past Surgical History:   Procedure Laterality Date    COLONOSCOPY       Social History   Social History     Substance and Sexual Activity   Alcohol Use Not Currently     Social History     Substance and Sexual Activity   Drug Use Yes    Types: Marijuana    Comment: states 2 ounces/month, medical card     Social History     Tobacco Use   Smoking Status Every Day    Current packs/day: 0.50    Types: Cigarettes   Smokeless Tobacco Never     Family History   Problem Relation Age of Onset    Colon cancer Neg Hx     Colon polyps Neg Hx        Meds/Allergies   Allergies   Allergen Reactions    Ammonia Nasal Congestion       Meds:    Current Facility-Administered Medications:     piperacillin-tazobactam (ZOSYN) IVPB 4.5 g, 4.5 g, Intravenous, Once **FOLLOWED BY** piperacillin-tazobactam (ZOSYN) IVPB (EXTENDED INFUSION) 4.5 g, 4.5 g, Intravenous, Q8H, Robson Meza MD    sodium chloride 0.9 % infusion, 150 mL/hr, Intravenous, Continuous, Fang Horton PA-C, Last Rate: 150 mL/hr at 04/16/24 1336, 150 mL/hr at 04/16/24 1336    Current Outpatient Medications:     vedolizumab 300 mg in sodium chloride 0.9 % 250 mL, Inject 300 mg into a catheter in a vein once, Disp: , Rfl:     (Not in a  "hospital admission)        Review of Systems:    A complete and comprehensive 14 point organ system review was performed and all other systems are negative other than stated above in the HPI    Current Vitals:   Blood Pressure: 115/60 (04/16/24 0906)  Pulse: 93 (04/16/24 0906)  Temperature: 98 °F (36.7 °C) (04/16/24 0906)  Temp Source: Oral (04/16/24 0906)  Respirations: 18 (04/16/24 0906)  Height: 5' 8\" (172.7 cm) (04/16/24 0906)  Weight - Scale: 60.3 kg (133 lb) (04/16/24 0906)  SpO2: 99 % (04/16/24 0906)  SPO2 RA Rest      Flowsheet Row ED from 4/16/2024 in  [facility] Emergency Department   SpO2 99 %   SpO2 Activity At Rest   O2 Device None (Room air)   O2 Flow Rate --            Intake/Output Summary (Last 24 hours) at 4/16/2024 1511  Last data filed at 4/16/2024 1035  Gross per 24 hour   Intake 1000 ml   Output --   Net 1000 ml     Body mass index is 20.22 kg/m².     Physical Exam:       General: ill appearing  HEENT: atraumatic, PERRLA, moist mucosa, normal pharynx, normal tonsils and adenoids, normal tongue, no fluid in sinuses  Neck: Trachea midline, no carotid bruit, no masses  Respiratory: normal chest wall expansion, CTA B, no r/r/w, no rubs  Cardiovascular: RRR, no m/r/g, Normal S1 and S2  Abdomen: Soft, rlq -tender, non-distended, normal bowel sounds in all quadrants, no hepatosplenomegaly, no tympany  Rectal: deferred  Musculoskeletal: normal ROM in upper and lower extremities  Integumentary: warm, dry, and pink, with no rash, purpura, or petechia  Heme/Lymph: no lymphadenopathy, no bruises  Neurological: Cranial Nerves II-XII grossly intact; no focal deficits in sensation or strength, A  x O x 3  Psychiatric: cooperative with normal mood, affect, and cognition    Lab Results:   CBC:   Lab Results   Component Value Date    WBC 15.58 (H) 04/16/2024    HGB 13.7 04/16/2024    HCT 43.0 04/16/2024    MCV 83 04/16/2024     (H) 04/16/2024    RBC 5.21 04/16/2024    MCH 26.3 (L) " "04/16/2024    MCHC 31.9 04/16/2024    RDW 14.9 04/16/2024    MPV 8.8 (L) 04/16/2024    NRBC 0 04/16/2024     CMP:  Lab Results   Component Value Date     04/16/2024    CO2 30 04/16/2024    BUN 11 04/16/2024    CREATININE 0.81 04/16/2024    CALCIUM 9.5 04/16/2024    AST 17 04/16/2024    ALT 18 04/16/2024    ALKPHOS 87 04/16/2024    EGFR 115 04/16/2024     No results found for: \"TROPONINI\", \"CKMB\", \"CKTOTAL\"  Coagulation: No results found for: \"PT\", \"INR\", \"APTT\" Urinalysis:  Lab Results   Component Value Date    COLORU Yellow 04/16/2024    CLARITYU Clear 04/16/2024    SPECGRAV <1.005 (L) 04/16/2024    PHUR 7.0 04/16/2024    LEUKOCYTESUR Negative 04/16/2024    NITRITE Negative 04/16/2024    GLUCOSEU Negative 04/16/2024    KETONESU Negative 04/16/2024    BILIRUBINUR Negative 04/16/2024    BLOODU Negative 04/16/2024      Amylase: No results found for: \"AMYLASE\"  Lipase:   Lab Results   Component Value Date    LIPASE 15 04/16/2024        Imaging: CT abdomen pelvis with contrast    Result Date: 4/16/2024  Narrative: CT ABDOMEN AND PELVIS WITH IV CONTRAST INDICATION: abdominal pain, h/o abscess. Crohn's disease COMPARISON: CT abdomen and pelvis December 7, 2023, MRI enterography December 20, 2023, endoscopy January 2024 TECHNIQUE: CT examination of the abdomen and pelvis was performed. Multiplanar 2D reformatted images were created from the source data. This examination, like all CT scans performed in the [facility], was performed utilizing techniques to minimize radiation dose exposure, including the use of iterative reconstruction and automated exposure control. Radiation dose length product (DLP) for this visit: 420.25 mGy-cm IV Contrast: 100 mL of iohexol (OMNIPAQUE) Enteric Contrast: Not administered. FINDINGS: ABDOMEN LOWER CHEST: A juxtapleural nodule in the left lower lobe series 2 image 2 measuring 6 mm is stable compared to prior exams dating back to October 2023. LIVER/BILIARY TREE: " Unremarkable. GALLBLADDER: No calcified gallstones. No pericholecystic inflammatory change. SPLEEN: Unremarkable. PANCREAS: Unremarkable. ADRENAL GLANDS: Unremarkable. KIDNEYS/URETERS: Unremarkable. No hydronephrosis. STOMACH AND BOWEL: Stomach is moderately distended. Again demonstrated is circumferential wall thickening and mucosal hyperemia involving a long segment of distal ileum measuring over 26 cm in length. There is suggestion of some progression of proximal involvement of ileal loops. There is interval progression of wall thickening measuring up to 1.5 cm series 2 image 120. Severe caliber narrowing is seen. Proximally dilated small bowel loops are seen with chronic stasis. Surrounding prominent mesenteric lymph nodes are noted. Multiple dilated small bowel loops are demonstrated in the right lower quadrant with content fecalization. They have a diameter of up to 3.5 cm. There is interval progression of the inflammatory mass which extends between small bowel loops in the pelvis and sigmoid colon. It measures approximately 4.7 x 4 cm series 2 image 112 compared to 3.2 x 2.5 cm on the previous exam. No definite air-containing fistulous tract is seen but this finding places the patient at high risk for development of fistulous. Wall thickening and inflammatory changes are also seen involving the sigmoid: Which shows wall thickening and stranding of the surrounding fat. Small amount of pelvic free fluid is seen. APPENDIX: Appendix is visualized. Secondary hyperemia is seen due to the inflammatory process in the right abdomen but there are no findings to suggest the presence of acute appendicitis. ABDOMINOPELVIC CAVITY: Trace pelvic free fluid. No pneumoperitoneum. Prominent mesenteric lymph nodes at the level of the right lower quadrant,, one of the largest measuring up to 9 mm series 2 image 93. VESSELS: Unremarkable for patient's age. PELVIS REPRODUCTIVE ORGANS: Unremarkable for patient's age. URINARY  "BLADDER: Unremarkable. ABDOMINAL WALL/INGUINAL REGIONS: Unremarkable. BONES: No acute fracture or suspicious osseous lesion.     Impression: 1. Interval progression of severe inflammatory changes involving ileal loops secondary to underlying Crohn's disease with more proximal extension, increasing wall thickening and edema as well as interval progression of interloop inflammatory mass. 2. Dilatation of small bowel loops with chronic stasis and stricture involving the distal ileum. 3. Sigmoid wall thickening which could be related to Crohn's colitis with matted inflamed small bowel and sigmoid loops in the pelvis in association with inflammatory mass described above. No definite air containing fistulous tract is visualized but the patient is at high risk for developing a fistula. 4. Left lower lobe lung nodule is unchanged since October 2023. This could be followed at the time of the patient's follow-up evaluation for exacerbation of Crohn's disease. The study was marked in EPIC for immediate notification. Workstation performed: [id number]     EKG, Pathology, and Other Studies: I have personally reviewed the results.  VTE   Prophylaxis: In place    Code Status: Prior    Anticipated Length of Stay:  Patient will be admitted on an Inpatient basis with an anticipated length of stay of  greater 2 midnights.       \"This note has been constructed using a voice recognition system\"      Robson Meza MD  4/16/2024, 3:11 PM        "
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
## 2024-04-17 NOTE — ASSESSMENT & PLAN NOTE
Patient presents with right lower quadrant abdominal pain, decreased ability to take it diet, nausea, sweats.  CT abdomen pelvis:  1. Interval progression of severe inflammatory changes involving ileal loops secondary to underlying Crohn's disease with more proximal extension, increasing wall thickening and edema as well as interval progression of interloop inflammatory mass.   2. Dilatation of small bowel loops with chronic stasis and stricture involving the distal ileum.   3. Sigmoid wall thickening which could be related to Crohn's colitis with matted inflamed small bowel and sigmoid loops in the pelvis in association with inflammatory mass described above. No definite air containing fistulous tract is visualized but the   patient is at high risk for developing a fistula.   4. Left lower lobe lung nodule is unchanged since October 2023. This could be followed at the time of the patient's follow-up evaluation for exacerbation of Crohn's disease.     IV fluids  Pain control  Antiemetics as required  Consult GI services. Discussed w/ Dr. Corona.  Cont sarahsyn.  Liquid diet for now.    Night of 4/16 patient asking to leave hospital AMA. Leaving due to wanting a second opinion from another GI/surgical team in [place]. Patient does not feel resection is the proper treatment for him at this time. Encouraged patient to stay in hospital and risks of leaving. Surgical team also discussed risks with patient. Patient understood risks and signed form. Cipro and flagyl sent to patient's pharmacy.

## 2024-04-17 NOTE — UTILIZATION REVIEW
NOTIFICATION OF INPATIENT ADMISSION   AUTHORIZATION REQUEST   SERVICING FACILITY:   [place] [address]  Tax ID: [id number]  NPI: [id number] ATTENDING PROVIDER:  Attending Name and NPI#: Robson Meza Md [[id number]]  Address: [address] [place] [address]  Phone: [phone]   ADMISSION INFORMATION:  Place of Service: Inpatient Acute [facility]  Place of Service Code: 21  Inpatient Admission Date/Time: 4/16/24 12:32 PM  Discharge Date/Time: 4/16/2024  8:15 PM  Admitting Diagnosis Code/Description:  Abdominal pain [R10.9]  Nausea and vomiting [R11.2]  Crohn's disease of small intestine with abscess (HCC) [K50.014]     UTILIZATION REVIEW CONTACT:  Niyah Villasenor Utilization   Network Utilization Review Department  Phone: [phone]  Fax [phone]  Email: [email or web address]  Contact for approvals/pending authorizations, clinical reviews, and discharge.     PHYSICIAN ADVISORY SERVICES:  Medical Necessity Denial & [id number]-vt-[id number] Review  Phone: [phone]  Fax: [phone]  Email: [email or web address]     DISCHARGE SUPPORT TEAM:  For Patients Discharge Needs & Updates  Phone: [phone] opt. [phone] Fax: [phone]  Email: [email or web address]

## 2024-04-17 NOTE — DISCHARGE SUMMARY
[facility]  Discharge- Victor Hugo Hurtado 1990, 33 y.o. male MRN: [id number]  Unit/Bed#: MS 224Yani01 Encounter: [id number]  Primary Care Provider: Douglas C Shoenberger, MD   Date and time admitted to hospital: 4/16/2024  9:12 AM    * Crohn's disease of small intestine with abscess (HCC)  Assessment & Plan  Patient presents with right lower quadrant abdominal pain, decreased ability to take it diet, nausea, sweats.  CT abdomen pelvis:  1. Interval progression of severe inflammatory changes involving ileal loops secondary to underlying Crohn's disease with more proximal extension, increasing wall thickening and edema as well as interval progression of interloop inflammatory mass.   2. Dilatation of small bowel loops with chronic stasis and stricture involving the distal ileum.   3. Sigmoid wall thickening which could be related to Crohn's colitis with matted inflamed small bowel and sigmoid loops in the pelvis in association with inflammatory mass described above. No definite air containing fistulous tract is visualized but the   patient is at high risk for developing a fistula.   4. Left lower lobe lung nodule is unchanged since October 2023. This could be followed at the time of the patient's follow-up evaluation for exacerbation of Crohn's disease.     IV fluids  Pain control  Antiemetics as required  Consult GI services. Discussed w/ Dr. Corona.  Cont zosyn.  Liquid diet for now.    Night of 4/16 patient asking to leave hospital AMA. Leaving due to wanting a second opinion from another GI/surgical team in [place]. Patient does not feel resection is the proper treatment for him at this time. Encouraged patient to stay in hospital and risks of leaving. Surgical team also discussed risks with patient. Patient understood risks and signed form. Cipro and flagyl sent to patient's pharmacy.     Leukocytosis  Assessment & Plan  Noted, may be reactive  Received Zosyn in  ER  Further abx if indicated by GI.     Lung nodule  Assessment & Plan  Left lower lobe lung nodule is unchanged since October 2023. This could be followed at the time of the patient's follow-up evaluation for exacerbation of Crohn's disease.     Known to pt, will need ongoing follow up.     Cigarette nicotine dependence without complication  Assessment & Plan  NRT    Discharging Physician / Practitioner: Brissa Mcknight PA-C  PCP: Douglas C Shoenberger, MD  Admission Date:   Admission Orders (From admission, onward)       Ordered        04/16/24 1231  INPATIENT ADMISSION  Once                          Discharge Date: 04/16/24    Medical Problems       Resolved Problems  Date Reviewed: 4/16/2024   None         Consultations During Hospital Stay:  GI  General surgery     Procedures Performed:   None     Significant Findings / Test Results:   CT abdomen pelvis with contrast  Interval progression of severe inflammatory changes involving ileal loops secondary to underlying Crohn's disease with more proximal extension, increasing wall thickening and edema as well as interval progression of interloop inflammatory mass.  Dilatation of small bowel loops with chronic stasis and stricture involving the distal ileum  Sigmoid wall thickening which could be related to Crohn's colitis with matted inflamed small bowel and sigmoid loops in the pelvis in association with inflammatory mass described above. No definite air containing fistulous tract is visualized but the patient is at high risk for developing a fistula  Left lower lobe lung nodule is unchanged since October 2023. This could be followed at the time of the patient's follow-up evaluation for exacerbation of Crohn's disease.    Test Results Pending at Discharge (will require follow up):   None     Outpatient Tests Requested:  None.  Patient left AMA    Complications: Patient left AMA    Reason for Admission: Crohn's disease of small intestine    Hospital Course:     Victor Hugo  Claude Hurtado is a 33 y.o. male patient who originally presented to the hospital on 4/16/2024 due to right lower quadrant abdominal pain, poor oral intake, nausea, sweats and blood in his stool.  Pain had been progressively worsening over the past few days.  Known history of Crohn's has been treated with steroids, antibiotics and Entyvio. Follows with Dr. Corona from GI.  CT abdomen pelvis performed showing progression of severe inflammatory changes involving ileal loop secondary to underlying Crohn's disease.  Patient was seen by the GI team and surgical team.  Surgical team had recommended to patient that he may require surgery with possible resection.  Patient became upset about this plan and voiced that he felt more comfortable going into [facility] and obtaining a second opinion.  Surgical team and myself attempted to educate patient on the importance of staying in the hospital which would delay intervention and could lead to worsening abdominal pain, infection, development of fistula, perforation, sepsis and/or death.  Patient voiced understanding of these risks and signed AMA form.  Oral antibiotics sent to patient's pharmacy.     The patient, initially admitted to the hospital as inpatient, was discharged earlier than expected given the following: AMA.    Please see above list of diagnoses and related plan for additional information.     Condition at Discharge: fair     Discharge Day Visit / Exam:     * Please refer to separate progress note for these details *    Discussion with Family: Client    Discharge instructions/Information to patient and family:   See after visit summary for information provided to patient and family.      Provisions for Follow-Up Care:  See after visit summary for information related to follow-up care and any pertinent home health orders.      Disposition: Home      Planned Readmission: No     Discharge Statement:  I spent 50 minutes discharging the patient. This time was  spent on the day of discharge. I had direct contact with the patient on the day of discharge. Greater than 50% of the total time was spent examining patient, answering all patient questions, arranging and discussing plan of care with patient as well as directly providing post-discharge instructions.  Additional time then spent on discharge activities.    Discharge Medications:  See after visit summary for reconciled discharge medications provided to patient and family.      ** Please Note: This note has been constructed using a voice recognition system **

## 2024-04-17 NOTE — UTILIZATION REVIEW
Initial Clinical Review    Admission: Date/Time/Statement:   Admission Orders (From admission, onward)       Ordered        04/16/24 1231  INPATIENT ADMISSION  Once                          Orders Placed This Encounter   Procedures    INPATIENT ADMISSION     Standing Status:   Standing     Number of Occurrences:   1     Order Specific Question:   Level of Care     Answer:   Med Surg [16]     Order Specific Question:   Estimated length of stay     Answer:   More than 2 Midnights     Order Specific Question:   Certification     Answer:   I certify that inpatient services are medically necessary for this patient for a duration of greater than two midnights. See H&P and MD Progress Notes for additional information about the patient's course of treatment.     ED Arrival Information       Expected   -    Arrival   4/16/2024 08:54    Acuity   Urgent              Means of arrival   Walk-In    Escorted by   Self    Service   Hospitalist    Admission type   Emergency              Arrival complaint   abdominal pain             Chief Complaint   Patient presents with    Abdominal Pain     Patient presents to the ER with RLQ abdominal pain.       Initial Presentation: 33 y.o. male  male to ED via walk in from home.    Admitted to inpatient with Dx: Crohn's Disease with Abscess.  Presented to ED with worsening abdominal pain, nausea and vomiting.  He was diagnosed with abscess in November, took antibiotics and started Entyvio at that time, improved slightly and over the last couple of days worse pain and blood in stool. PMHx:Crohn's on Entyvio. On exam: appears ill.  Bowel sounds increased.   Generalized abdominal tenderness.  CRP 29.3.   Sed rate 63.   Wbc 15.58.     Imaging shows progression of severe inflammatory disease secondary to crohn's, progression of interloop inflammatory mass.  Full report below in diagnostics.   ED treatment:  1 liter IVF bolus, started on Zosyn.    Plan includes continue Zosyn.  Aggressive IVF.   Trend CBC and BMP.  Consult Gi and surgery.  Clears.  Antiemetics and analgesia as needed.      4/16/24 per GI - this is failure of medical therapy.   Has Crohn's ileitis with significant stricturing and abscess and plan is continued antibiotics.  Liquid diet.  Hold steroids.  Consult surgery.   Anticipate will need colo surgical evaluation and surgical treatment of disease as has failed antibiotics, steroids and Entyvio.     4/16/24 per Surgery - patient with Crohn's Disease of Small intestine.   Ultimately patient will need surgery - partial small bowel resection and possible right hemicolectomy with possibility of diverting ileostomy.   Discussed with patient and he said he is considering signing out AMA and will seek second opinion in [facility].  He came to hospital for something to calm his stomach.      4/16/24 2015  left AMA    ED Triage Vitals [04/16/24 0906]   Temperature Pulse Respirations Blood Pressure SpO2   98 °F (36.7 °C) 93 18 115/60 99 %      Temp Source Heart Rate Source Patient Position - Orthostatic VS BP Location FiO2 (%)   Oral Monitor Sitting Right arm --      Pain Score       8          Wt Readings from Last 1 Encounters:   04/16/24 60.3 kg (133 lb)     Additional Vital Signs:   Date/Time Temp Pulse Resp BP MAP (mmHg) SpO2 O2 Device Patient Position - Orthostatic VS   04/16/24 17:33:26 -- -- -- 122/78 93 -- --      Pertinent Labs/Diagnostic Test Results:   CT abdomen pelvis with contrast   Final Result by Sadie Ordonez MD (04/16 1143)         1. Interval progression of severe inflammatory changes involving ileal loops secondary to underlying Crohn's disease with more proximal extension, increasing wall thickening and edema as well as interval progression of interloop inflammatory mass.   2. Dilatation of small bowel loops with chronic stasis and stricture involving the distal ileum.   3. Sigmoid wall thickening which could be related to Crohn's colitis with matted inflamed small bowel and  sigmoid loops in the pelvis in association with inflammatory mass described above. No definite air containing fistulous tract is visualized but the    patient is at high risk for developing a fistula.   4. Left lower lobe lung nodule is unchanged since October 2023. This could be followed at the time of the patient's follow-up evaluation for exacerbation of Crohn's disease.   The study was marked in EPIC for immediate notification.         Workstation performed: [id number]             Results from last 7 days   Lab Units 04/16/24  0929   WBC Thousand/uL 15.58*   HEMOGLOBIN g/dL 13.7   HEMATOCRIT % 43.0   PLATELETS Thousands/uL 415*   TOTAL NEUT ABS Thousands/µL 12.36*     Results from last 7 days   Lab Units 04/16/24  0929   SODIUM mmol/L 137   POTASSIUM mmol/L 4.3   CHLORIDE mmol/L 101   CO2 mmol/L 30   ANION GAP mmol/L 6   BUN mg/dL 11   CREATININE mg/dL 0.81   EGFR ml/min/1.73sq m 115   CALCIUM mg/dL 9.5     Results from last 7 days   Lab Units 04/16/24  0929   AST U/L 17   ALT U/L 18   ALK PHOS U/L 87   TOTAL PROTEIN g/dL 8.2   ALBUMIN g/dL 4.2   TOTAL BILIRUBIN mg/dL 0.75     Results from last 7 days   Lab Units 04/16/24  0929   GLUCOSE RANDOM mg/dL 124     Results from last 7 days   Lab Units 04/16/24  0929   PROCALCITONIN ng/ml <0.05     Results from last 7 days   Lab Units 04/16/24  0929   LACTIC ACID mmol/L 0.8     Results from last 7 days   Lab Units 04/16/24  0929   LIPASE u/L 15     Results from last 7 days   Lab Units 04/16/24  0929   CRP mg/L 29.3*   SED RATE mm/hour 63*     Results from last 7 days   Lab Units 04/16/24  0936   CLARITY UA  Clear   COLOR UA  Yellow   SPEC GRAV UA  <1.005*   PH UA  7.0   GLUCOSE UA mg/dl Negative   KETONES UA mg/dl Negative   BLOOD UA  Negative   PROTEIN UA mg/dl Negative   NITRITE UA  Negative   BILIRUBIN UA  Negative   UROBILINOGEN UA (BE) mg/dl <2.0   LEUKOCYTES UA  Negative     Results from last 7 days   Lab Units 04/16/24  0929   BLOOD CULTURE  Received in  Microbiology Lab. Culture in Progress.  Received in Microbiology Lab. Culture in Progress.       ED Treatment:   Medication Administration from 04/16/2024 0854 to 04/16/2024 1719         Date/Time Order Dose Route Action Comments     04/16/2024 0935 EDT sodium chloride 0.9 % bolus 1,000 mL 1,000 mL Intravenous New Bag --     04/16/2024 1335 EDT piperacillin-tazobactam (ZOSYN) IVPB 4.5 g 4.5 g Intravenous New Bag --     04/16/2024 1336 EDT sodium chloride 0.9 % infusion 150 mL/hr Intravenous New Bag --     04/16/2024 1702 EDT nicotine (NICODERM CQ) 14 mg/24hr TD 24 hr patch 1 patch 1 patch Transdermal Medication Applied --     04/16/2024 1703 EDT sodium chloride 0.9 % infusion 100 mL/hr Intravenous Rate/Dose Change --     04/16/2024 1703 EDT piperacillin-tazobactam (ZOSYN) IVPB (EXTENDED INFUSION) 4.5 g 4.5 g Intravenous Given --          Past Medical History:   Diagnosis Date    Crohn's colitis (HCC)      Present on Admission:   Crohn's disease of small intestine with abscess (HCC)   Cigarette nicotine dependence without complication   Lung nodule      Admitting Diagnosis: Abdominal pain [R10.9]  Nausea and vomiting [R11.2]  Crohn's disease of small intestine with abscess (HCC) [K50.014]  Age/Sex: 33 y.o. male  Admission Orders: 4/16/24 1231 inpatient   Scheduled Medications:  Medications Discontinued During This Encounter   Medication    sodium chloride 0.9 % infusion  100 mL/hr Dose: 100 mL/hr continuous starting 1703 4/16/24     piperacillin-tazobactam (ZOSYN) IVPB 4.5 g  Every 8 hours Route: IV     acetaminophen (TYLENOL) tablet 650 mg    sodium chloride 0.9 % infusion  150 ml/Hr from 1220 to 1553 on 4/16/24    enoxaparin (LOVENOX) subcutaneous injection 40 mg  Daily Route: SC     nicotine (NICODERM CQ) 14 mg/24hr TD 24 hr patch 1 patch  Daily Route: TD       4/16/24 clears     IP CONSULT TO GASTROENTEROLOGY  IP CONSULT TO ACUTE CARE SURGERY    Network Utilization Review Department  ATTENTION: Please call with  any questions or concerns to [phone] and carefully listen to the prompts so that you are directed to the right person. All voicemails are confidential.   For Discharge needs, contact Care Management DC Support Team at [phone] opt. 2  Send all requests for admission clinical reviews, approved or denied determinations and any other requests to dedicated fax number below belonging to the campus where the patient is receiving treatment. List of dedicated fax numbers for the Facilities:  FACILITY NAME UR FAX NUMBER   ADMISSION DENIALS (Administrative/Medical Necessity) [phone]   DISCHARGE SUPPORT TEAM (NETWORK) [phone]   PARENT CHILD HEALTH (Maternity/NICU/Pediatrics) [phone]   [facility] [phone]   [facility] [phone]   [facility] [phone]   [facility] [phone]   [facility] [phone]   [facility] [phone]   [facility] [phone]   [facility] [phone]   [facility] [phone]   [facility] [phone]   [facility] [phone]   [facility] [phone]

## 2024-04-18 NOTE — UTILIZATION REVIEW
NOTIFICATION OF ADMISSION DISCHARGE   This is a Notification of Discharge from [facility]. Please be advised that this patient has been discharge from our facility. Below you will find the admission and discharge date and time including the patient’s disposition.   UTILIZATION REVIEW CONTACT:  Niyah Villasenor  Utilization   Network Utilization Review Department  Phone: [phone] carefully listen to the prompts. All voicemails are confidential.  Email: [email or web address]     ADMISSION INFORMATION  PRESENTATION DATE: 4/16/2024  9:12 AM  OBERVATION ADMISSION DATE:   INPATIENT ADMISSION DATE: 4/16/24 12:32 PM   DISCHARGE DATE: 4/16/2024  8:15 PM   DISPOSITION:Left against medical advice or discontinued care    Network Utilization Review Department  ATTENTION: Please call with any questions or concerns to [phone] and carefully listen to the prompts so that you are directed to the right person. All voicemails are confidential.   For Discharge needs, contact Care Management DC Support Team at [phone] opt. 2  Send all requests for admission clinical reviews, approved or denied determinations and any other requests to dedicated fax number below belonging to the campus where the patient is receiving treatment. List of dedicated fax numbers for the Facilities:  FACILITY NAME UR FAX NUMBER   ADMISSION DENIALS (Administrative/Medical Necessity) [phone]   DISCHARGE SUPPORT TEAM ([facility]) [phone]   PARENT CHILD HEALTH (Maternity/NICU/Pediatrics) [phone]   [facility] [phone]   [facility] [phone]   [facility] [phone]   [facility] [phone]   [facility] [phone]   [facility] [phone]   [facility] [phone]   [facility]  [facility] [phone]   [facility] [phone]   [facility] [phone]   [facility] [phone]   [facility] [phone]

## 2024-04-21 LAB
BACTERIA BLD CULT: NORMAL
BACTERIA BLD CULT: NORMAL

## 2024-05-23 ENCOUNTER — HOSPITAL ENCOUNTER (OUTPATIENT)
Dept: CT IMAGING | Facility: HOSPITAL | Age: 34
Discharge: HOME | End: 2024-05-23
Attending: INTERNAL MEDICINE
Payer: COMMERCIAL

## 2024-05-23 DIAGNOSIS — K50.014 CROHN'S DISEASE OF SMALL INTESTINE WITH ABSCESS (HCC): ICD-10-CM

## 2024-05-23 PROCEDURE — 74177 CT ABD & PELVIS W/CONTRAST: CPT

## 2024-05-23 RX ADMIN — IOHEXOL 80 ML: 350 INJECTION, SOLUTION INTRAVENOUS at 17:14

## 2024-05-28 ENCOUNTER — TELEPHONE (OUTPATIENT)
Age: 34
End: 2024-05-28

## 2024-05-28 ENCOUNTER — NURSE TRIAGE (OUTPATIENT)
Age: 34
End: 2024-05-28

## 2024-05-28 NOTE — TELEPHONE ENCOUNTER
"LOV 4/12/24 Dr. Corona (Crohn's), SLUB 4/16/24 for Crohn's left noting seeking second opinion in [place]. I spoke with patient, he stated he called offices in [place] and was told they do not schedule for 2nd opinions. Patient was provided with Dr. Freedman's name by someone else and he is requesting visit. Patient scheduled for new patient visit 6/5/24. Patient was advised to report to ED for severe pain but patient declined at this time.      Answer Assessment - Initial Assessment Questions  1. REASON FOR CALL or QUESTION: \"What is your reason for calling today?\" or \"How can I best help you?\" or \"What question do you have that I can help answer?\"      Patient called for appointment with Dr. Freedman.    Protocols used: Information Only Call - No Triage-ADULT-[place]    "

## 2024-05-28 NOTE — TELEPHONE ENCOUNTER
----- Message from Neeru STAUFFER sent at 5/28/2024 12:10 PM EDT -----  Patient of Dr. Corona  States he is in severe pain/discomfort and does not feel like he is getting help, explanations or attention  He is asking to see Dr. Freedman recommended by a family member  Recent CT scan results are not in yet.  No available appointments until October.

## 2024-05-30 ENCOUNTER — TELEPHONE (OUTPATIENT)
Age: 34
End: 2024-05-30

## 2024-05-30 NOTE — TELEPHONE ENCOUNTER
Patients GI provider:  Dr. Corona    Number to return call: [phone]    Reason for call: Iliana from Radiology called in with significant findings for the CT of the abd.     Scheduled procedure/appointment date if applicable: Appt 6/5/24

## 2024-06-04 NOTE — PROGRESS NOTES
[facility] - Outpatient Consultation  Victor Hugo Hurtado 33 y.o. male MRN: [id number]  Encounter: [id number]          ASSESSMENT AND PLAN:    Victor Hugo Hurtado is a 33 y.o. male with ileal Crohn's disease complicated by abscess and stricture diagnosed November 2023 treated with antibiotics and Entyvio who presents for second opinion.      His disease has progressed with new abscess despite entyvio. He would benefit from surgery and post-op biologic.     Colonoscopy from January 2024 with stricture that was not traversable at the ileocecal valve and internal hemorrhoids.  Biopsies with inflammation and preserved crypt architecture.    MR enterography from December 2023 with active inflammatory small bowel Crohn's with luminal narrowing involving distal 15 cm of the ileum and stricture with imaging findings of active inflammation as well as inflammatory mass.    Most recent CT from May 2024 with development of a 3.9 x 3.9 x 3.8 right lower pelvis interloop abscess and persistent inflammatory changes involving a long segment of distal ileum mildly improved distally particularly at the level of the terminal ileum and improved proximal small bowel dilation and interval decrease in the size of the inflammatory mass.    CMP normal.  Lactic acid normal.  CBC with elevated white count and platelets but normal hemoglobin and MCV.  Elevated CRP and sed rate.    1. Crohn's disease of both small and large intestine with abscess (HCC)    2. Immunocompromised state (HCC)    3. Bloody diarrhea    4. Abdominal pain, unspecified abdominal location        Orders Placed This Encounter   Procedures    CBC and differential    Comprehensive metabolic panel    C-reactive protein    Vedolizumab and Anti-Vedo Ab    Ambulatory Referral to Colorectal Surgery     Next colonoscopy possibly in 2030  Referral to colorectal surgery  Depending on plans for surgery may need IR for drainage of  abscess  Antibiotics  Next quant gold and hepatitis panel January 2025  Repeat blood work including CBC for leukocytosis and inflammatory markers  Consideration of therapy with infliximab given progression of disease in terms of abscess formation  For now on Entyvio  Low residue and low FODMAP diet, high protein (including protein shakes and supplements)  ______________________________________________________________________    Referred by Dr. Schoenberger for Crohn's    HPI:    Victor Hugo Hurtado is a 33 y.o. male who presents with complaint of Crohn's.     Sharp pain since October, sharp pain in the RLQ and groin and radiating up. Went to the hospital, found to have an abscess. He was admitted, signed himself out the next day. He was told he might need surgery. The week of Thanksgiving back in the hospital. He was told he needs surgery. He was started on Entyvio. He was supposed to get one on 5/29 but it had to be rescheduled.   He uses marijuana and some ibuprofen the last two days.   He feels gas pain  He is sleepy.     3-6 BMs per day usually but more constipated now  sometimes formed but not always  + bright red blood per rectum/rectal bleeding  + tenesmus, + urgency, + nocturnal BMs, no incontinence.  No rashes, no mouth sores, + joint pains (hips and tail bone)  No heartburn, dysphagia, odynophagia, nausea, vomiting    MST:  Have you recently lost weight?  No 0   Unsure 2    If yes, how much weight have you lost?  1-13lb  1  14-23lb 2  24-33lb 3  34lb or more  4    Weight loss score 3    Have you been eating poorly because of a decreased appetite?  No  0  Yes  1    Appetite score 1    MST 0 or 1 (not at risk)  MST 2 or more (at risk)    Malnutrition Diagnosis (GLIM Criteria)    1 phenotypic criterion:    - Involuntary weight loss   >5% within past 6 months, or >10% beyond 6 months  - Low BMI  <20 if <70, <22 if >70  <18.5 if <70, <20 if >70  - Reduced muscle mass    1 etiologic criterion:  - Reduced  "food intake (</= 50 of energy requirements in past 1 week)  - Inflammation (NLR >5)        REVIEW OF SYSTEMS:  10 point ROS reviewed and negative, except as above        Historical Information   Past Medical History:   Diagnosis Date    Crohn's colitis (HCC)      Past Surgical History:   Procedure Laterality Date    COLONOSCOPY       Social History   Social History     Substance and Sexual Activity   Alcohol Use Not Currently     Social History     Substance and Sexual Activity   Drug Use Yes    Types: Marijuana    Comment: states 2 ounces/month, medical card     Social History     Tobacco Use   Smoking Status Every Day    Current packs/day: 0.50    Types: Cigarettes   Smokeless Tobacco Never     Family History   Problem Relation Age of Onset    Colon cancer Neg Hx     Colon polyps Neg Hx        Meds/Allergies       Current Outpatient Medications:     Cholecalciferol (Vitamin D3) 50 MCG (2000 UT) capsule    ciprofloxacin (CIPRO) 500 mg tablet    metroNIDAZOLE (FLAGYL) 500 mg tablet    vedolizumab 300 mg in sodium chloride 0.9 % 250 mL    Allergies   Allergen Reactions    Ammonia Nasal Congestion           Objective     Blood pressure 111/77, pulse 98, temperature 98.3 °F (36.8 °C), temperature source Tympanic, height 5' 8\" (1.727 m), weight 55.2 kg (121 lb 9.6 oz), SpO2 99%. Body mass index is 18.49 kg/m².      PHYSICAL EXAMINATION:    General Appearance:   Alert, cooperative, no distress   HEENT:  Normocephalic, atraumatic, anicteric. Neck supple, symmetrical, trachea midline.   Lungs:   Equal chest rise and unlabored breathing, normal effort, no coughing.   Cardiovascular:   No visualized JVD.   Abdomen:   + abdominal distension and some TTP without guarding or rebound   Skin:   No jaundice, rashes, or lesions.    Musculoskeletal:   Normal range of motion visualized.   Psych:  Normal affect and normal insight.   Neuro:  Alert and appropriate.         Lab Results:   No visits with results within 1 Day(s) from this " visit.   Latest known visit with results is:   Admission on 04/16/2024, Discharged on 04/16/2024   Component Date Value    WBC 04/16/2024 15.58 (H)     RBC 04/16/2024 5.21     Hemoglobin 04/16/2024 13.7     Hematocrit 04/16/2024 43.0     MCV 04/16/2024 83     MCH 04/16/2024 26.3 (L)     MCHC 04/16/2024 31.9     RDW 04/16/2024 14.9     MPV 04/16/2024 8.8 (L)     Platelets 04/16/2024 415 (H)     nRBC 04/16/2024 0     Segmented % 04/16/2024 78 (H)     Immature Grans % 04/16/2024 1     Lymphocytes % 04/16/2024 12 (L)     Monocytes % 04/16/2024 6     Eosinophils Relative 04/16/2024 2     Basophils Relative 04/16/2024 1     Absolute Neutrophils 04/16/2024 12.36 (H)     Absolute Immature Grans 04/16/2024 0.10     Absolute Lymphocytes 04/16/2024 1.88     Absolute Monocytes 04/16/2024 0.90     Eosinophils Absolute 04/16/2024 0.25     Basophils Absolute 04/16/2024 0.09     Sodium 04/16/2024 137     Potassium 04/16/2024 4.3     Chloride 04/16/2024 101     CO2 04/16/2024 30     ANION GAP 04/16/2024 6     BUN 04/16/2024 11     Creatinine 04/16/2024 0.81     Glucose 04/16/2024 124     Calcium 04/16/2024 9.5     AST 04/16/2024 17     ALT 04/16/2024 18     Alkaline Phosphatase 04/16/2024 87     Total Protein 04/16/2024 8.2     Albumin 04/16/2024 4.2     Total Bilirubin 04/16/2024 0.75     eGFR 04/16/2024 115     Blood Culture 04/16/2024 No Growth After 5 Days.     Blood Culture 04/16/2024 No Growth After 5 Days.     LACTIC ACID 04/16/2024 0.8     Procalcitonin 04/16/2024 <0.05     Lipase 04/16/2024 15     Color, UA 04/16/2024 Yellow     Clarity, UA 04/16/2024 Clear     Specific Gravity, UA 04/16/2024 <1.005 (L)     pH, UA 04/16/2024 7.0     Leukocytes, UA 04/16/2024 Negative     Nitrite, UA 04/16/2024 Negative     Protein, UA 04/16/2024 Negative     Glucose, UA 04/16/2024 Negative     Ketones, UA 04/16/2024 Negative     Urobilinogen, UA 04/16/2024 <2.0     Bilirubin, UA 04/16/2024 Negative     Occult Blood, UA 04/16/2024 Negative   "   Sed Rate 04/16/2024 63 (H)     CRP 04/16/2024 29.3 (H)        Lab Results   Component Value Date    WBC 15.58 (H) 04/16/2024    HGB 13.7 04/16/2024    HCT 43.0 04/16/2024    MCV 83 04/16/2024     (H) 04/16/2024       Lab Results   Component Value Date    SODIUM 137 04/16/2024    K 4.3 04/16/2024     04/16/2024    CO2 30 04/16/2024    AGAP 6 04/16/2024    BUN 11 04/16/2024    CREATININE 0.81 04/16/2024    GLUC 124 04/16/2024    CALCIUM 9.5 04/16/2024    AST 17 04/16/2024    ALT 18 04/16/2024    ALKPHOS 87 04/16/2024    TP 8.2 04/16/2024    TBILI 0.75 04/16/2024    EGFR 115 04/16/2024       Lab Results   Component Value Date    CRP 29.3 (H) 04/16/2024       No results found for: \"FIP9ZXKOPMKV\", \"TSH\"    No results found for: \"IRON\", \"TIBC\", \"FERRITIN\"    Radiology Results:   CT abdomen pelvis w contrast    Result Date: 5/30/2024  Narrative: CT ABDOMEN AND PELVIS WITH IV CONTRAST INDICATION: K50.014: Crohn's disease of small intestine with abscess. COMPARISON: CT abdomen/pelvis dated 4/16/2024. TECHNIQUE: CT examination of the abdomen and pelvis was performed. Multiplanar 2D reformatted images were created from the source data. This examination, like all CT scans performed in the [facility], was performed utilizing techniques to minimize radiation dose exposure, including the use of iterative reconstruction and automated exposure control. Radiation dose length product (DLP) for this visit: 331.99 mGy-cm IV Contrast: 80 mL of iohexol (OMNIPAQUE) Enteric Contrast: Administered. FINDINGS: ABDOMEN LOWER CHEST: Stable 6 mm subpleural left lower lobe nodule. LIVER/BILIARY TREE: Unremarkable. GALLBLADDER: No calcified gallstones. No pericholecystic inflammatory change. SPLEEN: Unremarkable. PANCREAS: Unremarkable. ADRENAL GLANDS: Unremarkable. KIDNEYS/URETERS: Unremarkable. No hydronephrosis. STOMACH AND BOWEL: There is persistent circumferential wall thickening and mucosal hyperemia of a long " segment of distal ileum, mildly improved distally particularly at the level of the terminal ileum. There is also improvement in the previously described proximal small bowel dilatation with fecalization of contents. The previously described inflammatory mass interposed between the small bowel loops and sigmoid colon has decreased in size now measuring 2.6 x 2.1 cm (image 125, series 301), previously 4.7 x 4.0 cm. There is however a new thick walled, rim-enhancing fluid collection measuring 3.9 x 3.9 x 3.8 cm (image 125, series 301 and image 85, series 602) in the right lower pelvis associated with the diseased distal ileum segment consistent with interloop abscess. APPENDIX: No findings to suggest appendicitis. ABDOMINOPELVIC CAVITY: No ascites. No pneumoperitoneum. No lymphadenopathy. VESSELS: Unremarkable for patient's age. PELVIS REPRODUCTIVE ORGANS: Unremarkable for patient's age. URINARY BLADDER: Unremarkable. ABDOMINAL WALL/INGUINAL REGIONS: Unremarkable. BONES: No acute fracture or suspicious osseous lesion.     Impression: Interval development of a 3.9 x 3.9 x 3.8 cm right lower pelvis interloop abscess in this patient with Crohn's disease. Persistent inflammatory changes involving a long segment of distal ileum, mildly improved distally particularly at the level of the terminal ileum. Improved proximal small bowel dilatation with fecalization of contents. Interval decrease in size of the inflammatory mass interposed between the small bowel and sigmoid colon now measuring 2.6 x 2.1 cm, previously 4.7 x 4.0 cm. The study was marked in EPIC for immediate notification. Workstation performed: [id number]

## 2024-06-05 ENCOUNTER — OFFICE VISIT (OUTPATIENT)
Dept: GASTROENTEROLOGY | Facility: CLINIC | Age: 34
End: 2024-06-05
Payer: COMMERCIAL

## 2024-06-05 VITALS
HEIGHT: 68 IN | TEMPERATURE: 98.3 F | HEART RATE: 98 BPM | SYSTOLIC BLOOD PRESSURE: 111 MMHG | WEIGHT: 121.6 LBS | OXYGEN SATURATION: 99 % | DIASTOLIC BLOOD PRESSURE: 77 MMHG | BODY MASS INDEX: 18.43 KG/M2

## 2024-06-05 DIAGNOSIS — R10.9 ABDOMINAL PAIN, UNSPECIFIED ABDOMINAL LOCATION: ICD-10-CM

## 2024-06-05 DIAGNOSIS — K50.814 CROHN'S DISEASE OF BOTH SMALL AND LARGE INTESTINE WITH ABSCESS (HCC): Primary | ICD-10-CM

## 2024-06-05 DIAGNOSIS — R19.7 BLOODY DIARRHEA: ICD-10-CM

## 2024-06-05 DIAGNOSIS — D84.9 IMMUNOCOMPROMISED STATE (HCC): ICD-10-CM

## 2024-06-05 PROCEDURE — 99214 OFFICE O/P EST MOD 30 MIN: CPT | Performed by: INTERNAL MEDICINE

## 2024-06-05 RX ORDER — ACETAMINOPHEN 160 MG
2000 TABLET,DISINTEGRATING ORAL DAILY
COMMUNITY
Start: 2024-05-22

## 2024-06-05 RX ORDER — METRONIDAZOLE 500 MG/1
500 TABLET ORAL EVERY 8 HOURS SCHEDULED
Qty: 42 TABLET | Refills: 0 | Status: SHIPPED | OUTPATIENT
Start: 2024-06-05 | End: 2024-06-19

## 2024-06-05 RX ORDER — CIPROFLOXACIN 500 MG/1
500 TABLET, FILM COATED ORAL EVERY 12 HOURS SCHEDULED
Qty: 28 TABLET | Refills: 0 | Status: SHIPPED | OUTPATIENT
Start: 2024-06-05 | End: 2024-06-19

## 2024-06-05 NOTE — PROGRESS NOTES
Ambulatory Visit  Name: Victor Hugo Hurtado      : 1990      MRN: [id number]  Encounter Provider: Jana Lugo MD  Encounter Date: 2024   Encounter department: [facility] [place]    Assessment & Plan   1. Crohn's disease of both small and large intestine with abscess (HCC)  -     Ambulatory Referral to Colorectal Surgery  -     Case request operating room: Laparoscopic possible hand assisted ileocecectomy; Standing  -     Type and screen; Future  -     Type and screen  -     Case request operating room: Laparoscopic possible hand assisted ileocecectomy  -     Ambulatory Referral to Surgical Optimization; Future  2. Immunocompromised state (HCC)  -     Ambulatory Referral to Colorectal Surgery  3. Bloody diarrhea  -     Ambulatory Referral to Colorectal Surgery  4. Abdominal pain, unspecified abdominal location  -     Ambulatory Referral to Colorectal Surgery  -Discussed the risk, benefits, and alternatives of laparoscopic ileocecectomy.  We also discussed postoperative recovery and restrictions.  Patient is agreeable to proceed.  -Will continue close follow-up with Dr. Freedman of gastroenterology  -Follow-up in office 3 to 4 weeks postop  -Patient and his significant other know that they may contact the office at any point with questions and concerns.    History of Present Illness     Victor Hugo Hurtado is a 33 y.o. male who presents who was referred today by,Sam Hong MD. for Crohns disease of both small and large intestine with abscess.    Patient reports that he was diagnosed with Crohn's disease in 2023 after experiencing acute onset of abdominal pain. However, he reports a history of bloody stools 5 years prior to presentation.    Last colonoscopy done 24 by, Leandro Corona MD. 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    Over the past several months, the patient  reports multiple hospitalizations for abdominal pain.  He had been treated for intra-abdominal abscess that had been unable to be drained.  He recalls starting Entyvio, leading to worsening pain.  He reports that he is not currently on maintenance medication for Crohn's disease.  He continues to experience abdominal pain on a daily basis.  He reports that he has lost 20 to 30 pounds in the past several months.    He works as a , and his job involves a significant amount of physical activity    Review of Systems   Constitutional:  Positive for unexpected weight change. Negative for chills and fever.   HENT:  Negative for ear pain and sore throat.    Eyes:  Negative for pain and visual disturbance.   Respiratory:  Negative for cough and shortness of breath.    Cardiovascular:  Negative for chest pain and palpitations.   Gastrointestinal:  Positive for abdominal pain and blood in stool. Negative for vomiting.   Genitourinary:  Negative for dysuria and hematuria.   Musculoskeletal:  Negative for arthralgias and back pain.   Skin:  Negative for color change and rash.   Neurological:  Negative for seizures and syncope.   All other systems reviewed and are negative.    Past Medical History   Past Medical History:   Diagnosis Date    Crohn's colitis (HCC)      Past Surgical History:   Procedure Laterality Date    COLONOSCOPY       Family History   Problem Relation Age of Onset    Colon cancer Neg Hx     Colon polyps Neg Hx      Current Outpatient Medications on File Prior to Visit   Medication Sig Dispense Refill    Cholecalciferol (Vitamin D3) 50 MCG (2000 UT) capsule Take 2,000 Units by mouth daily      ciprofloxacin (CIPRO) 500 mg tablet Take 1 tablet (500 mg total) by mouth every 12 (twelve) hours for 14 days 28 tablet 0    metroNIDAZOLE (FLAGYL) 500 mg tablet Take 1 tablet (500 mg total) by mouth every 8 (eight) hours for 14 days 42 tablet 0    vedolizumab 300 mg in sodium chloride 0.9 % 250 mL Inject 300 mg  "into a catheter in a vein once (Patient not taking: Reported on 6/7/2024)       No current facility-administered medications on file prior to visit.     Allergies   Allergen Reactions    Ammonia Nasal Congestion      Objective     Ht 5' 8\" (1.727 m)   Wt 56.7 kg (125 lb)   BMI 19.01 kg/m²     Physical Exam  Vitals and nursing note reviewed.   Constitutional:       General: He is not in acute distress.     Appearance: He is well-developed.   HENT:      Head: Normocephalic and atraumatic.   Eyes:      Conjunctiva/sclera: Conjunctivae normal.   Cardiovascular:      Rate and Rhythm: Normal rate and regular rhythm.      Heart sounds: No murmur heard.  Pulmonary:      Effort: Pulmonary effort is normal. No respiratory distress.      Breath sounds: Normal breath sounds.   Abdominal:      Palpations: Abdomen is soft.      Tenderness: There is no abdominal tenderness.   Musculoskeletal:         General: No swelling.      Cervical back: Neck supple.   Skin:     General: Skin is warm and dry.      Capillary Refill: Capillary refill takes less than 2 seconds.   Neurological:      Mental Status: He is alert.   Psychiatric:         Mood and Affect: Mood normal.       Administrative Statements   I have spent a total time of 45 minutes on 06/07/24 In caring for this patient including Diagnostic results, Prognosis, Risks and benefits of tx options, Instructions for management, Patient and family education, Importance of tx compliance, Risk factor reductions, Impressions, Counseling / Coordination of care, Documenting in the medical record, Reviewing / ordering tests, medicine, procedures  , Obtaining or reviewing history  , and Communicating with other healthcare professionals .        "

## 2024-06-05 NOTE — H&P (VIEW-ONLY)
Ambulatory Visit  Name: Victor Hugo Hurtado      : 1990      MRN: [id number]  Encounter Provider: Jana Lugo MD  Encounter Date: 2024   Encounter department: [facility] RECT[facility]GERY [place]    Assessment & Plan   1. Crohn's disease of both small and large intestine with abscess (HCC)  -     Ambulatory Referral to Colorectal Surgery  -     Case request operating room: Laparoscopic possible hand assisted ileocecectomy; Standing  -     Type and screen; Future  -     Type and screen  -     Case request operating room: Laparoscopic possible hand assisted ileocecectomy  -     Ambulatory Referral to Surgical Optimization; Future  2. Immunocompromised state (HCC)  -     Ambulatory Referral to Colorectal Surgery  3. Bloody diarrhea  -     Ambulatory Referral to Colorectal Surgery  4. Abdominal pain, unspecified abdominal location  -     Ambulatory Referral to Colorectal Surgery  -Discussed the risk, benefits, and alternatives of laparoscopic ileocecectomy.  We also discussed postoperative recovery and restrictions.  Patient is agreeable to proceed.  -Will continue close follow-up with Dr. Freedman of gastroenterology  -Follow-up in office 3 to 4 weeks postop  -Patient and his significant other know that they may contact the office at any point with questions and concerns.    History of Present Illness     Victor Hugo Hurtado is a 33 y.o. male who presents who was referred today by,Sam Hong MD. for Crohns disease of both small and large intestine with abscess.    Patient reports that he was diagnosed with Crohn's disease in 2023 after experiencing acute onset of abdominal pain. However, he reports a history of bloody stools 5 years prior to presentation.    Last colonoscopy done 24 by, Leandro Corona MD. 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    Over the past several months, the patient  reports multiple hospitalizations for abdominal pain.  He had been treated for intra-abdominal abscess that had been unable to be drained.  He recalls starting Entyvio, leading to worsening pain.  He reports that he is not currently on maintenance medication for Crohn's disease.  He continues to experience abdominal pain on a daily basis.  He reports that he has lost 20 to 30 pounds in the past several months.    He works as a , and his job involves a significant amount of physical activity    Review of Systems   Constitutional:  Positive for unexpected weight change. Negative for chills and fever.   HENT:  Negative for ear pain and sore throat.    Eyes:  Negative for pain and visual disturbance.   Respiratory:  Negative for cough and shortness of breath.    Cardiovascular:  Negative for chest pain and palpitations.   Gastrointestinal:  Positive for abdominal pain and blood in stool. Negative for vomiting.   Genitourinary:  Negative for dysuria and hematuria.   Musculoskeletal:  Negative for arthralgias and back pain.   Skin:  Negative for color change and rash.   Neurological:  Negative for seizures and syncope.   All other systems reviewed and are negative.    Past Medical History   Past Medical History:   Diagnosis Date    Crohn's colitis (HCC)      Past Surgical History:   Procedure Laterality Date    COLONOSCOPY       Family History   Problem Relation Age of Onset    Colon cancer Neg Hx     Colon polyps Neg Hx      Current Outpatient Medications on File Prior to Visit   Medication Sig Dispense Refill    Cholecalciferol (Vitamin D3) 50 MCG (2000 UT) capsule Take 2,000 Units by mouth daily      ciprofloxacin (CIPRO) 500 mg tablet Take 1 tablet (500 mg total) by mouth every 12 (twelve) hours for 14 days 28 tablet 0    metroNIDAZOLE (FLAGYL) 500 mg tablet Take 1 tablet (500 mg total) by mouth every 8 (eight) hours for 14 days 42 tablet 0    vedolizumab 300 mg in sodium chloride 0.9 % 250 mL Inject 300 mg  "into a catheter in a vein once (Patient not taking: Reported on 6/7/2024)       No current facility-administered medications on file prior to visit.     Allergies   Allergen Reactions    Ammonia Nasal Congestion      Objective     Ht 5' 8\" (1.727 m)   Wt 56.7 kg (125 lb)   BMI 19.01 kg/m²     Physical Exam  Vitals and nursing note reviewed.   Constitutional:       General: He is not in acute distress.     Appearance: He is well-developed.   HENT:      Head: Normocephalic and atraumatic.   Eyes:      Conjunctiva/sclera: Conjunctivae normal.   Cardiovascular:      Rate and Rhythm: Normal rate and regular rhythm.      Heart sounds: No murmur heard.  Pulmonary:      Effort: Pulmonary effort is normal. No respiratory distress.      Breath sounds: Normal breath sounds.   Abdominal:      Palpations: Abdomen is soft.      Tenderness: There is no abdominal tenderness.   Musculoskeletal:         General: No swelling.      Cervical back: Neck supple.   Skin:     General: Skin is warm and dry.      Capillary Refill: Capillary refill takes less than 2 seconds.   Neurological:      Mental Status: He is alert.   Psychiatric:         Mood and Affect: Mood normal.       Administrative Statements   I have spent a total time of 45 minutes on 06/07/24 In caring for this patient including Diagnostic results, Prognosis, Risks and benefits of tx options, Instructions for management, Patient and family education, Importance of tx compliance, Risk factor reductions, Impressions, Counseling / Coordination of care, Documenting in the medical record, Reviewing / ordering tests, medicine, procedures  , Obtaining or reviewing history  , and Communicating with other healthcare professionals .        "

## 2024-06-06 ENCOUNTER — APPOINTMENT (OUTPATIENT)
Dept: LAB | Facility: HOSPITAL | Age: 34
End: 2024-06-06
Payer: COMMERCIAL

## 2024-06-06 DIAGNOSIS — D84.9 IMMUNOCOMPROMISED STATE (HCC): ICD-10-CM

## 2024-06-06 DIAGNOSIS — K50.814 CROHN'S DISEASE OF BOTH SMALL AND LARGE INTESTINE WITH ABSCESS (HCC): ICD-10-CM

## 2024-06-06 LAB
ALBUMIN SERPL BCP-MCNC: 3.9 G/DL (ref 3.5–5)
ALP SERPL-CCNC: 70 U/L (ref 34–104)
ALT SERPL W P-5'-P-CCNC: 5 U/L (ref 7–52)
ANION GAP SERPL CALCULATED.3IONS-SCNC: 7 MMOL/L (ref 4–13)
AST SERPL W P-5'-P-CCNC: 9 U/L (ref 13–39)
BASOPHILS # BLD AUTO: 0.08 THOUSANDS/ÂΜL (ref 0–0.1)
BASOPHILS NFR BLD AUTO: 1 % (ref 0–1)
BILIRUB SERPL-MCNC: 0.87 MG/DL (ref 0.2–1)
BUN SERPL-MCNC: 10 MG/DL (ref 5–25)
CALCIUM SERPL-MCNC: 9.8 MG/DL (ref 8.4–10.2)
CHLORIDE SERPL-SCNC: 98 MMOL/L (ref 96–108)
CO2 SERPL-SCNC: 30 MMOL/L (ref 21–32)
CREAT SERPL-MCNC: 0.84 MG/DL (ref 0.6–1.3)
CRP SERPL QL: 192.7 MG/L
EOSINOPHIL # BLD AUTO: 0.28 THOUSAND/ÂΜL (ref 0–0.61)
EOSINOPHIL NFR BLD AUTO: 2 % (ref 0–6)
ERYTHROCYTE [DISTWIDTH] IN BLOOD BY AUTOMATED COUNT: 13.1 % (ref 11.6–15.1)
GFR SERPL CREATININE-BSD FRML MDRD: 114 ML/MIN/1.73SQ M
GLUCOSE SERPL-MCNC: 104 MG/DL (ref 65–140)
HCT VFR BLD AUTO: 37.6 % (ref 36.5–49.3)
HGB BLD-MCNC: 11.8 G/DL (ref 12–17)
IMM GRANULOCYTES # BLD AUTO: 0.1 THOUSAND/UL (ref 0–0.2)
IMM GRANULOCYTES NFR BLD AUTO: 1 % (ref 0–2)
LYMPHOCYTES # BLD AUTO: 1.89 THOUSANDS/ÂΜL (ref 0.6–4.47)
LYMPHOCYTES NFR BLD AUTO: 13 % (ref 14–44)
MCH RBC QN AUTO: 26 PG (ref 26.8–34.3)
MCHC RBC AUTO-ENTMCNC: 31.4 G/DL (ref 31.4–37.4)
MCV RBC AUTO: 83 FL (ref 82–98)
MONOCYTES # BLD AUTO: 1.2 THOUSAND/ÂΜL (ref 0.17–1.22)
MONOCYTES NFR BLD AUTO: 9 % (ref 4–12)
NEUTROPHILS # BLD AUTO: 10.6 THOUSANDS/ÂΜL (ref 1.85–7.62)
NEUTS SEG NFR BLD AUTO: 74 % (ref 43–75)
NRBC BLD AUTO-RTO: 0 /100 WBCS
PLATELET # BLD AUTO: 555 THOUSANDS/UL (ref 149–390)
PMV BLD AUTO: 8.7 FL (ref 8.9–12.7)
POTASSIUM SERPL-SCNC: 4 MMOL/L (ref 3.5–5.3)
PROT SERPL-MCNC: 8 G/DL (ref 6.4–8.4)
RBC # BLD AUTO: 4.54 MILLION/UL (ref 3.88–5.62)
SODIUM SERPL-SCNC: 135 MMOL/L (ref 135–147)
WBC # BLD AUTO: 14.15 THOUSAND/UL (ref 4.31–10.16)

## 2024-06-06 PROCEDURE — 36415 COLL VENOUS BLD VENIPUNCTURE: CPT

## 2024-06-06 PROCEDURE — 82397 CHEMILUMINESCENT ASSAY: CPT

## 2024-06-06 PROCEDURE — 86140 C-REACTIVE PROTEIN: CPT

## 2024-06-06 PROCEDURE — 85025 COMPLETE CBC W/AUTO DIFF WBC: CPT

## 2024-06-06 PROCEDURE — 80053 COMPREHEN METABOLIC PANEL: CPT

## 2024-06-06 PROCEDURE — 80280 DRUG ASSAY VEDOLIZUMAB: CPT

## 2024-06-07 ENCOUNTER — OFFICE VISIT (OUTPATIENT)
Age: 34
End: 2024-06-07
Payer: COMMERCIAL

## 2024-06-07 ENCOUNTER — TELEPHONE (OUTPATIENT)
Age: 34
End: 2024-06-07

## 2024-06-07 VITALS — BODY MASS INDEX: 18.94 KG/M2 | HEIGHT: 68 IN | WEIGHT: 125 LBS

## 2024-06-07 DIAGNOSIS — K50.814 CROHN'S DISEASE OF BOTH SMALL AND LARGE INTESTINE WITH ABSCESS (HCC): Primary | ICD-10-CM

## 2024-06-07 DIAGNOSIS — R10.9 ABDOMINAL PAIN, UNSPECIFIED ABDOMINAL LOCATION: ICD-10-CM

## 2024-06-07 DIAGNOSIS — D84.9 IMMUNOCOMPROMISED STATE (HCC): ICD-10-CM

## 2024-06-07 DIAGNOSIS — R19.7 BLOODY DIARRHEA: ICD-10-CM

## 2024-06-07 DIAGNOSIS — K50.814 CROHN'S DISEASE OF BOTH SMALL AND LARGE INTESTINE WITH ABSCESS (HCC): ICD-10-CM

## 2024-06-07 PROCEDURE — 99204 OFFICE O/P NEW MOD 45 MIN: CPT | Performed by: SURGERY

## 2024-06-07 RX ORDER — HEPARIN SODIUM 5000 [USP'U]/ML
5000 INJECTION, SOLUTION INTRAVENOUS; SUBCUTANEOUS ONCE
OUTPATIENT
Start: 2024-06-07 | End: 2024-06-07

## 2024-06-07 NOTE — LETTER
Victor Hugo Hurtado  [address] [place] [address]  [place] [address]        ------------------------------------------------------------------------------------------------------------  6/7/2024    Dear Victor Hugo Hurtado,    Your surgery is scheduled for: 6/26/2024   The hospital will call the evening prior to your surgery with your expected arrival time.   Location:[address] [address]    CHECK LIST PRIOR TO INPATIENT SURGERY  It is your responsibility to obtain any/all referrals needed for your surgery if required by your insurance. Our office will contact you to discuss your insurance coverage for this procedure.    Special instructions required if you are taking any blood thinners. Please verify with the prescribing physician. Examples include Coumadin, Plavix, Xarelto, Eliquis, Pradaxa, etc.    Please check with your family physician if you are taking the following medications: Aspirin or any Aspirin containing medication, Gingko biloba, Ginseng, Feverfew, and/or Yuki’s Wort. We suggest stopping these for 3 days.     The night before and the day of your surgery, wash from your neck to groin with chlorhexidine soap.  This soap is available at most retail pharmacies under such brand names as Hibiclens, Endure or Aplicare.    Pre-admission testing Required: YES x NO     If Yes, what type:  BLOOD-WORK  x EKG   CHEST X-RAY       Other Clearances/ Additional Testing: NONE    BOWEL PREPARATION REQUIRED: YES  x NO   If yes, start date: 6/25/2024 . Please see attached bowel preparation instructions.    NOTHING TO EAT OR DRINK AFTER MIDNIGHT PRIOR TO SURGERY.    Please do not hesitate to call our office with any questions regarding your surgery.                   MIRALAX/GATORADE SURGERY PREPARATION INSTRUCTIONS    Purchase:   (1) 238g bottle of MiraLax or Glycolax - no prescription needed   4 Dulcolax Laxative Tablets - no prescription needed   64 oz. bottle of  Gatorade (NOT RED), Crystal Light, or another clear liquid of  choice.   **REMEMBER** A prescription for antibiotics has been called into your pharmacy for  prior to your surgery.    One Day Prior to Surgery  Have a normal breakfast, light lunch, and clear liquids thereafter.  It is important that you drink plenty of clear liquids throughout the day to prevent dehydration.  CLEAR LIQUIDS INCLUDE:  Water, Clear Fruit Juice (Apple, Cranberry, Grape), Black Coffee, Tea, Ginger Ale, Gatorade, Keith-Aid, Hi-C, plain Jello, Ice Pops, Clear Broth or Bouillon, Crystal Light, Lemonade, Soda (regular or diet).    No Milk Products!    At 1:00 pm, take (4) Dulcolax Tablets with 8 oz. of water.  Swallow the tablets whole with a full glass of water.  The package may direct you not to exceed (2) tablets at any time but for the purpose of this examination, you should take (4).    At 1:00 pm, take your first dose of antibiotic as prescribed.    At 1:00 pm, Mix the 238g bottle of MiraLax in 64 oz. of Gatorade and shake the solution until the MiraLax is dissolved.  Drink 8 oz. glassfuls at your own pace.  It may take 3-4 hours to drink all the solution.    At 10:00 pm, take your last dose of antibiotic as prescribed.    REMEMBER TO STAY CLOSE TO TOILET FACILITIES.    The Day of Surgery  Take nothing by mouth until after surgery.                                            Post-Operative Care Information  Abdominal Surgery  What are my post-operative instructions?   The following information and instructions are for your continued care after discharge from the hospital. Please read the information (including the After Visit Summary provided to you at the time of discharge) carefully. If you have any questions or concerns, please contact the clinical staff at [phone]    How do I take care of my incision?  Your incision(s) may have surgical glue, dissolvable sutures with white pieces of tape called steri strips, or staples.    If you have steri strips or surgical glue, do not remove them. Steri strips will fall off naturally in 7-10 days. Surgical glue (Exofin) will fall off over a period of up to 2-3 weeks.   Do not put any topical ointments or lotions on the incisions.   Avoid tight clothing around your incision(s) or fabric that may irritate your skin such as wool, hooks and agustina.     Should I continue taking my prescribed medications?   Take medications as prescribed. Please review the After Visit Summary provided to you at the time of discharge for details.     How will I manage my pain at home?  For best pain control take your pain medication at regular intervals for the first couple of days, about every 4-6 hours. This will prevent pain build-up, which occurs when medication is taken on an as needed basis.   Use only as much prescription pain medication as you need (i.e. 1 tablet instead of 2).  Taper off the prescription pain medication as pain decreases, stopping narcotics first.   Use over-the-counter acetaminophen (Tylenolâ) if your doctor allows. When using over-the-counter medication, never use more than what is prescribed on the package directions. Do not take more than 3000mg of Tylenolâ in a 24 hour period. Do not take more than 2400mg of Ibuprofen (such as Motrinâ or Advilâ) in a 24 hour period.   While taking prescription pain medication you may not drive, work, or drink alcohol.     Are there any diet restrictions?   Continue low fiber diet up to 1 week post op.  (This allows the bowel to rest)    It is normal for bowel movements to be loose and occur more frequently post-operatively. This should improve over time.  During this time pay attention to hydration  1.5 weeks post op you may slowly begin to add fiber back into your diet.    By 2 weeks post op you may resume a normal high fiber diet.     What activity restrictions will I have?   Walk as much as tolerated.  Do not lift, pull, or push objects greater than  10 pounds for 6 weeks. This includes vacuuming, lifting children or groceries, walking the dog, mowing lawns, snow shoveling, etc. Please discuss other specific physical activities with the doctor at your post op appointment.   Do not drive while taking pain medications. You may drive when you have no pain - usually in 1-2 weeks following surgery.   You may climb stairs in moderation but do not overtire.  Resume sexual activity after you are cleared by your doctor.     When will I receive follow-up care?   You will be scheduled to return to see your physician in the office typically in 3-4 weeks after surgery.    If you do not have a scheduled appointment at the time of discharge, please call the office at [phone].    When should I call my doctor?   Notify your doctor for any of the following signs and symptoms of possible infection:   Temperature above 101 degrees.   Increase in pain or discomfort.   Redness, swelling, drainage at incision site(s). A small amount of clear yellow or pinkish-yellow drainage is normal  If incision begins to open.     Call if you have any changes in your overall health such as having:   Nausea   Vomiting   Chills   profuse (excessive) sweating   inability to urinate or completely empty bladder   diarrhea   constipation

## 2024-06-07 NOTE — TELEPHONE ENCOUNTER
Patient scheduled for surgery 6/26/2024  at [facility] OR . Instructions and PAT's gone over with and handed to the patient.

## 2024-06-11 ENCOUNTER — TELEPHONE (OUTPATIENT)
Dept: ANESTHESIOLOGY | Facility: CLINIC | Age: 34
End: 2024-06-11

## 2024-06-13 ENCOUNTER — ANESTHESIA EVENT (OUTPATIENT)
Dept: PERIOP | Facility: HOSPITAL | Age: 34
DRG: 329 | End: 2024-06-13
Payer: COMMERCIAL

## 2024-06-13 NOTE — PRE-PROCEDURE INSTRUCTIONS
Pre-Surgery Instructions:   Medication Instructions    Cholecalciferol (Vitamin D3) 50 MCG (2000 UT) capsule Stop taking 7 days prior to surgery.    ciprofloxacin (CIPRO) 500 mg tablet Take as prescribed    metroNIDAZOLE (FLAGYL) 500 mg tablet Take as prescribed    Medication instructions for day surgery reviewed. Please use only a sip of water to take your instructed medications. Avoid all over the counter vitamins, supplements and NSAIDS for one week prior to surgery per anesthesia guidelines. Tylenol is ok to take as needed.     You will receive a call one business day prior to surgery with an arrival time and hospital directions. If your surgery is scheduled on a Monday, the hospital will be calling you on the Friday prior to your surgery. If you have not heard from anyone by 8pm, please call the hospital supervisor through the hospital  at [phone]. ([place] [phone] or [place] [phone]). Patient has bowel prep instructions.    Do not eat or drink anything after midnight the night before your surgery, including candy, mints, lifesavers, or chewing gum. Do not drink alcohol 24hrs before your surgery. Try not to smoke at least 24hrs before your surgery.       Follow the pre surgery showering instructions as listed in the “My Surgical Experience Booklet” or otherwise provided by your surgeon's office. Do not use a blade to shave the surgical area 1 week before surgery. It is okay to use a clean electric clippers up to 24 hours before surgery. Do not apply any lotions, creams, including makeup, cologne, deodorant, or perfumes after showering on the day of your surgery. Do not use dry shampoo, hair spray, hair gel, or any type of hair products.     No contact lenses, eye make-up, or artificial eyelashes. Remove nail polish, including gel polish, and any artificial, gel, or acrylic nails if possible. Remove all jewelry including rings and body piercing jewelry.     Wear causal clothing that is  easy to take on and off. Consider your type of surgery.    Keep any valuables, jewelry, piercings at home. Please bring any specially ordered equipment (sling, braces) if indicated.    Arrange for a responsible person to drive you to and from the hospital on the day of your surgery. Please confirm the visitor policy for the day of your procedure when you receive your phone call with an arrival time.     Call the surgeon's office with any new illnesses, exposures, or additional questions prior to surgery.    Please reference your “My Surgical Experience Booklet” for additional information to prepare for your upcoming surgery.

## 2024-06-17 ENCOUNTER — TELEPHONE (OUTPATIENT)
Dept: ANESTHESIOLOGY | Facility: CLINIC | Age: 34
End: 2024-06-17

## 2024-06-17 LAB
VEDOLIZUMAB AB SERPL IA-MCNC: <25 NG/ML
VEDOLIZUMAB SERPL IA-MCNC: 2.9 UG/ML

## 2024-06-18 ENCOUNTER — TELEPHONE (OUTPATIENT)
Dept: GASTROENTEROLOGY | Facility: CLINIC | Age: 34
End: 2024-06-18

## 2024-06-18 ENCOUNTER — APPOINTMENT (OUTPATIENT)
Dept: LAB | Facility: HOSPITAL | Age: 34
End: 2024-06-18
Payer: COMMERCIAL

## 2024-06-18 ENCOUNTER — LAB REQUISITION (OUTPATIENT)
Dept: LAB | Facility: HOSPITAL | Age: 34
End: 2024-06-18
Payer: COMMERCIAL

## 2024-06-18 DIAGNOSIS — K50.814 CROHN'S DISEASE OF BOTH SMALL AND LARGE INTESTINE WITH ABSCESS (HCC): ICD-10-CM

## 2024-06-18 DIAGNOSIS — D84.9 IMMUNOCOMPROMISED STATE (HCC): ICD-10-CM

## 2024-06-18 LAB
ABO GROUP BLD: NORMAL
ALBUMIN SERPL BCG-MCNC: 3.9 G/DL (ref 3.5–5)
ALP SERPL-CCNC: 67 U/L (ref 34–104)
ALT SERPL W P-5'-P-CCNC: 18 U/L (ref 7–52)
ANION GAP SERPL CALCULATED.3IONS-SCNC: 5 MMOL/L (ref 4–13)
AST SERPL W P-5'-P-CCNC: 17 U/L (ref 13–39)
BASOPHILS # BLD AUTO: 0.08 THOUSANDS/ÂΜL (ref 0–0.1)
BASOPHILS NFR BLD AUTO: 1 % (ref 0–1)
BILIRUB SERPL-MCNC: 0.88 MG/DL (ref 0.2–1)
BLD GP AB SCN SERPL QL: NEGATIVE
BUN SERPL-MCNC: 9 MG/DL (ref 5–25)
CALCIUM SERPL-MCNC: 9.7 MG/DL (ref 8.4–10.2)
CHLORIDE SERPL-SCNC: 104 MMOL/L (ref 96–108)
CO2 SERPL-SCNC: 30 MMOL/L (ref 21–32)
CREAT SERPL-MCNC: 0.87 MG/DL (ref 0.6–1.3)
CRP SERPL QL: 10.4 MG/L
EOSINOPHIL # BLD AUTO: 0.36 THOUSAND/ÂΜL (ref 0–0.61)
EOSINOPHIL NFR BLD AUTO: 2 % (ref 0–6)
ERYTHROCYTE [DISTWIDTH] IN BLOOD BY AUTOMATED COUNT: 14.6 % (ref 11.6–15.1)
EST. AVERAGE GLUCOSE BLD GHB EST-MCNC: 140 MG/DL
GFR SERPL CREATININE-BSD FRML MDRD: 112 ML/MIN/1.73SQ M
GLUCOSE SERPL-MCNC: 110 MG/DL (ref 65–140)
HBA1C MFR BLD: 6.5 %
HCT VFR BLD AUTO: 40.3 % (ref 36.5–49.3)
HGB BLD-MCNC: 12.5 G/DL (ref 12–17)
IMM GRANULOCYTES # BLD AUTO: 0.1 THOUSAND/UL (ref 0–0.2)
IMM GRANULOCYTES NFR BLD AUTO: 1 % (ref 0–2)
LYMPHOCYTES # BLD AUTO: 2.35 THOUSANDS/ÂΜL (ref 0.6–4.47)
LYMPHOCYTES NFR BLD AUTO: 15 % (ref 14–44)
MCH RBC QN AUTO: 25.9 PG (ref 26.8–34.3)
MCHC RBC AUTO-ENTMCNC: 31 G/DL (ref 31.4–37.4)
MCV RBC AUTO: 83 FL (ref 82–98)
MONOCYTES # BLD AUTO: 1.11 THOUSAND/ÂΜL (ref 0.17–1.22)
MONOCYTES NFR BLD AUTO: 7 % (ref 4–12)
NEUTROPHILS # BLD AUTO: 11.68 THOUSANDS/ÂΜL (ref 1.85–7.62)
NEUTS SEG NFR BLD AUTO: 74 % (ref 43–75)
NRBC BLD AUTO-RTO: 0 /100 WBCS
PLATELET # BLD AUTO: 471 THOUSANDS/UL (ref 149–390)
PMV BLD AUTO: 8.7 FL (ref 8.9–12.7)
POTASSIUM SERPL-SCNC: 4.5 MMOL/L (ref 3.5–5.3)
PROT SERPL-MCNC: 7.5 G/DL (ref 6.4–8.4)
RBC # BLD AUTO: 4.83 MILLION/UL (ref 3.88–5.62)
RH BLD: POSITIVE
SODIUM SERPL-SCNC: 139 MMOL/L (ref 135–147)
SPECIMEN EXPIRATION DATE: NORMAL
WBC # BLD AUTO: 15.68 THOUSAND/UL (ref 4.31–10.16)

## 2024-06-18 PROCEDURE — 86140 C-REACTIVE PROTEIN: CPT

## 2024-06-18 PROCEDURE — 86901 BLOOD TYPING SEROLOGIC RH(D): CPT | Performed by: SURGERY

## 2024-06-18 PROCEDURE — 80280 DRUG ASSAY VEDOLIZUMAB: CPT

## 2024-06-18 PROCEDURE — 85025 COMPLETE CBC W/AUTO DIFF WBC: CPT

## 2024-06-18 PROCEDURE — 86900 BLOOD TYPING SEROLOGIC ABO: CPT | Performed by: SURGERY

## 2024-06-18 PROCEDURE — 80053 COMPREHEN METABOLIC PANEL: CPT

## 2024-06-18 PROCEDURE — 83036 HEMOGLOBIN GLYCOSYLATED A1C: CPT

## 2024-06-18 PROCEDURE — 36415 COLL VENOUS BLD VENIPUNCTURE: CPT

## 2024-06-18 PROCEDURE — 82397 CHEMILUMINESCENT ASSAY: CPT

## 2024-06-18 PROCEDURE — 86850 RBC ANTIBODY SCREEN: CPT | Performed by: SURGERY

## 2024-06-18 NOTE — TELEPHONE ENCOUNTER
Hi,    Can we please get him approved for Entyvio every 4 weeks since his level was almost undetectable?  He also was having issues with the option care.  Is there another way he can get his infusions?    Thank you

## 2024-06-26 ENCOUNTER — HOSPITAL ENCOUNTER (INPATIENT)
Facility: HOSPITAL | Age: 34
LOS: 3 days | Discharge: HOME | DRG: 329 | End: 2024-06-29
Attending: SURGERY | Admitting: SURGERY
Payer: COMMERCIAL

## 2024-06-26 ENCOUNTER — ANESTHESIA (OUTPATIENT)
Dept: PERIOP | Facility: HOSPITAL | Age: 34
DRG: 329 | End: 2024-06-26
Payer: COMMERCIAL

## 2024-06-26 DIAGNOSIS — K50.814 CROHN'S DISEASE OF BOTH SMALL AND LARGE INTESTINE WITH ABSCESS (HCC): ICD-10-CM

## 2024-06-26 DIAGNOSIS — F11.90 OPIOID USE DISORDER: ICD-10-CM

## 2024-06-26 DIAGNOSIS — K50.014 CROHN'S DISEASE OF SMALL INTESTINE WITH ABSCESS (HCC): Primary | ICD-10-CM

## 2024-06-26 LAB
ABO GROUP BLD: NORMAL
RH BLD: POSITIVE
VEDOLIZUMAB AB SERPL IA-MCNC: <25 NG/ML
VEDOLIZUMAB SERPL IA-MCNC: 2 UG/ML

## 2024-06-26 PROCEDURE — C9290 INJ, BUPIVACAINE LIPOSOME: HCPCS | Performed by: SURGERY

## 2024-06-26 PROCEDURE — 88342 IMHCHEM/IMCYTCHM 1ST ANTB: CPT | Performed by: PATHOLOGY

## 2024-06-26 PROCEDURE — 88307 TISSUE EXAM BY PATHOLOGIST: CPT | Performed by: PATHOLOGY

## 2024-06-26 PROCEDURE — 0DJD8ZZ INSPECTION OF LOWER INTESTINAL TRACT, VIA NATURAL OR ARTIFICIAL OPENING ENDOSCOPIC: ICD-10-PCS | Performed by: SURGERY

## 2024-06-26 PROCEDURE — 0DTH0ZZ RESECTION OF CECUM, OPEN APPROACH: ICD-10-PCS | Performed by: SURGERY

## 2024-06-26 PROCEDURE — 44205 LAP COLECTOMY PART W/ILEUM: CPT | Performed by: SURGERY

## 2024-06-26 RX ORDER — METRONIDAZOLE 500 MG/100ML
500 INJECTION, SOLUTION INTRAVENOUS EVERY 8 HOURS
Status: DISCONTINUED | OUTPATIENT
Start: 2024-06-27 | End: 2024-06-29 | Stop reason: HOSPADM

## 2024-06-26 RX ORDER — SODIUM CHLORIDE, SODIUM LACTATE, POTASSIUM CHLORIDE, CALCIUM CHLORIDE 600; 310; 30; 20 MG/100ML; MG/100ML; MG/100ML; MG/100ML
125 INJECTION, SOLUTION INTRAVENOUS CONTINUOUS
Status: DISCONTINUED | OUTPATIENT
Start: 2024-06-26 | End: 2024-06-27

## 2024-06-26 RX ORDER — ONDANSETRON 2 MG/ML
4 INJECTION INTRAMUSCULAR; INTRAVENOUS ONCE AS NEEDED
Status: DISCONTINUED | OUTPATIENT
Start: 2024-06-26 | End: 2024-06-26 | Stop reason: HOSPADM

## 2024-06-26 RX ORDER — METRONIDAZOLE 500 MG/100ML
500 INJECTION, SOLUTION INTRAVENOUS ONCE
Status: COMPLETED | OUTPATIENT
Start: 2024-06-26 | End: 2024-06-26

## 2024-06-26 RX ORDER — PROPOFOL 10 MG/ML
INJECTION, EMULSION INTRAVENOUS CONTINUOUS PRN
Status: DISCONTINUED | OUTPATIENT
Start: 2024-06-26 | End: 2024-06-26

## 2024-06-26 RX ORDER — KETAMINE HCL IN NACL, ISO-OSM 100MG/10ML
SYRINGE (ML) INJECTION AS NEEDED
Status: DISCONTINUED | OUTPATIENT
Start: 2024-06-26 | End: 2024-06-26

## 2024-06-26 RX ORDER — MAGNESIUM HYDROXIDE 1200 MG/15ML
LIQUID ORAL AS NEEDED
Status: DISCONTINUED | OUTPATIENT
Start: 2024-06-26 | End: 2024-06-26 | Stop reason: HOSPADM

## 2024-06-26 RX ORDER — MEPERIDINE HYDROCHLORIDE 25 MG/ML
12.5 INJECTION INTRAMUSCULAR; INTRAVENOUS; SUBCUTANEOUS ONCE AS NEEDED
Status: CANCELLED | OUTPATIENT
Start: 2024-06-26

## 2024-06-26 RX ORDER — LIDOCAINE HYDROCHLORIDE 10 MG/ML
INJECTION, SOLUTION EPIDURAL; INFILTRATION; INTRACAUDAL; PERINEURAL AS NEEDED
Status: DISCONTINUED | OUTPATIENT
Start: 2024-06-26 | End: 2024-06-26

## 2024-06-26 RX ORDER — DEXAMETHASONE SODIUM PHOSPHATE 10 MG/ML
INJECTION, SOLUTION INTRAMUSCULAR; INTRAVENOUS AS NEEDED
Status: DISCONTINUED | OUTPATIENT
Start: 2024-06-26 | End: 2024-06-26

## 2024-06-26 RX ORDER — GABAPENTIN 100 MG/1
100 CAPSULE ORAL 3 TIMES DAILY
Status: DISCONTINUED | OUTPATIENT
Start: 2024-06-26 | End: 2024-06-29 | Stop reason: HOSPADM

## 2024-06-26 RX ORDER — ACETAMINOPHEN 10 MG/ML
1000 INJECTION, SOLUTION INTRAVENOUS EVERY 6 HOURS SCHEDULED
Status: DISPENSED | OUTPATIENT
Start: 2024-06-27 | End: 2024-06-29

## 2024-06-26 RX ORDER — HALOPERIDOL 5 MG/ML
2 INJECTION INTRAMUSCULAR
Status: DISCONTINUED | OUTPATIENT
Start: 2024-06-26 | End: 2024-06-28

## 2024-06-26 RX ORDER — HEPARIN SODIUM 5000 [USP'U]/ML
5000 INJECTION, SOLUTION INTRAVENOUS; SUBCUTANEOUS ONCE
Status: COMPLETED | OUTPATIENT
Start: 2024-06-26 | End: 2024-06-26

## 2024-06-26 RX ORDER — GLYCOPYRROLATE 0.2 MG/ML
0.2 INJECTION INTRAMUSCULAR; INTRAVENOUS EVERY 4 HOURS PRN
Status: DISCONTINUED | OUTPATIENT
Start: 2024-06-26 | End: 2024-06-28

## 2024-06-26 RX ORDER — ENOXAPARIN SODIUM 100 MG/ML
40 INJECTION SUBCUTANEOUS DAILY
Status: DISCONTINUED | OUTPATIENT
Start: 2024-06-27 | End: 2024-06-29 | Stop reason: HOSPADM

## 2024-06-26 RX ORDER — MEPERIDINE HYDROCHLORIDE 25 MG/ML
100 INJECTION INTRAMUSCULAR; INTRAVENOUS; SUBCUTANEOUS ONCE
Status: COMPLETED | OUTPATIENT
Start: 2024-06-26 | End: 2024-06-26

## 2024-06-26 RX ORDER — LORAZEPAM 2 MG/ML
0.5 INJECTION INTRAMUSCULAR EVERY 2 HOUR PRN
Status: DISCONTINUED | OUTPATIENT
Start: 2024-06-26 | End: 2024-06-28

## 2024-06-26 RX ORDER — SUCCINYLCHOLINE/SOD CL,ISO/PF 100 MG/5ML
SYRINGE (ML) INTRAVENOUS AS NEEDED
Status: DISCONTINUED | OUTPATIENT
Start: 2024-06-26 | End: 2024-06-26

## 2024-06-26 RX ORDER — ACETAMINOPHEN 10 MG/ML
1000 INJECTION, SOLUTION INTRAVENOUS ONCE
Status: COMPLETED | OUTPATIENT
Start: 2024-06-26 | End: 2024-06-26

## 2024-06-26 RX ORDER — PROPOFOL 10 MG/ML
INJECTION, EMULSION INTRAVENOUS AS NEEDED
Status: DISCONTINUED | OUTPATIENT
Start: 2024-06-26 | End: 2024-06-26

## 2024-06-26 RX ORDER — GLYCOPYRROLATE 0.2 MG/ML
INJECTION INTRAMUSCULAR; INTRAVENOUS AS NEEDED
Status: DISCONTINUED | OUTPATIENT
Start: 2024-06-26 | End: 2024-06-26

## 2024-06-26 RX ORDER — SODIUM CHLORIDE 9 MG/ML
INJECTION, SOLUTION INTRAVENOUS CONTINUOUS PRN
Status: DISCONTINUED | OUTPATIENT
Start: 2024-06-26 | End: 2024-06-26

## 2024-06-26 RX ORDER — MAGNESIUM SULFATE HEPTAHYDRATE 40 MG/ML
INJECTION, SOLUTION INTRAVENOUS AS NEEDED
Status: DISCONTINUED | OUTPATIENT
Start: 2024-06-26 | End: 2024-06-26

## 2024-06-26 RX ORDER — ONDANSETRON 2 MG/ML
INJECTION INTRAMUSCULAR; INTRAVENOUS AS NEEDED
Status: DISCONTINUED | OUTPATIENT
Start: 2024-06-26 | End: 2024-06-26

## 2024-06-26 RX ORDER — ONDANSETRON 2 MG/ML
4 INJECTION INTRAMUSCULAR; INTRAVENOUS EVERY 4 HOURS PRN
Status: DISCONTINUED | OUTPATIENT
Start: 2024-06-26 | End: 2024-06-28

## 2024-06-26 RX ORDER — CEFAZOLIN SODIUM 1 G/50ML
1000 SOLUTION INTRAVENOUS ONCE
Status: COMPLETED | OUTPATIENT
Start: 2024-06-26 | End: 2024-06-26

## 2024-06-26 RX ORDER — MEPERIDINE HYDROCHLORIDE 50 MG/ML
50 INJECTION INTRAMUSCULAR; INTRAVENOUS; SUBCUTANEOUS EVERY 4 HOURS PRN
Status: COMPLETED | OUTPATIENT
Start: 2024-06-27 | End: 2024-06-27

## 2024-06-26 RX ORDER — SODIUM CHLORIDE, SODIUM LACTATE, POTASSIUM CHLORIDE, CALCIUM CHLORIDE 600; 310; 30; 20 MG/100ML; MG/100ML; MG/100ML; MG/100ML
INJECTION, SOLUTION INTRAVENOUS CONTINUOUS PRN
Status: DISCONTINUED | OUTPATIENT
Start: 2024-06-26 | End: 2024-06-26

## 2024-06-26 RX ORDER — MIDAZOLAM HYDROCHLORIDE 2 MG/2ML
INJECTION, SOLUTION INTRAMUSCULAR; INTRAVENOUS AS NEEDED
Status: DISCONTINUED | OUTPATIENT
Start: 2024-06-26 | End: 2024-06-26

## 2024-06-26 RX ORDER — ROCURONIUM BROMIDE 10 MG/ML
INJECTION, SOLUTION INTRAVENOUS AS NEEDED
Status: DISCONTINUED | OUTPATIENT
Start: 2024-06-26 | End: 2024-06-26

## 2024-06-26 RX ORDER — METHOCARBAMOL 100 MG/ML
1000 INJECTION, SOLUTION INTRAMUSCULAR; INTRAVENOUS EVERY 8 HOURS SCHEDULED
Status: COMPLETED | OUTPATIENT
Start: 2024-06-26 | End: 2024-06-28

## 2024-06-26 RX ADMIN — ROCURONIUM BROMIDE 20 MG: 10 INJECTION, SOLUTION INTRAVENOUS at 17:12

## 2024-06-26 RX ADMIN — METHOCARBAMOL 1000 MG: 100 INJECTION INTRAMUSCULAR; INTRAVENOUS at 21:55

## 2024-06-26 RX ADMIN — ROCURONIUM BROMIDE 10 MG: 10 INJECTION, SOLUTION INTRAVENOUS at 15:53

## 2024-06-26 RX ADMIN — Medication 100 MG: at 15:54

## 2024-06-26 RX ADMIN — MIDAZOLAM 2 MG: 1 INJECTION INTRAMUSCULAR; INTRAVENOUS at 15:46

## 2024-06-26 RX ADMIN — Medication 30 MG: at 16:24

## 2024-06-26 RX ADMIN — SODIUM CHLORIDE, SODIUM LACTATE, POTASSIUM CHLORIDE, AND CALCIUM CHLORIDE 125 ML/HR: .6; .31; .03; .02 INJECTION, SOLUTION INTRAVENOUS at 20:50

## 2024-06-26 RX ADMIN — PROPOFOL 200 MG: 10 INJECTION, EMULSION INTRAVENOUS at 15:53

## 2024-06-26 RX ADMIN — HEPARIN SODIUM 5000 UNITS: 5000 INJECTION INTRAVENOUS; SUBCUTANEOUS at 12:12

## 2024-06-26 RX ADMIN — CEFTRIAXONE 1000 MG: 1 INJECTION, POWDER, FOR SOLUTION INTRAMUSCULAR; INTRAVENOUS at 20:54

## 2024-06-26 RX ADMIN — SUGAMMADEX 200 MG: 100 INJECTION, SOLUTION INTRAVENOUS at 18:59

## 2024-06-26 RX ADMIN — KETAMINE HYDROCHLORIDE 0.1 MG/KG/HR: 100 INJECTION INTRAMUSCULAR; INTRAVENOUS at 19:30

## 2024-06-26 RX ADMIN — ROCURONIUM BROMIDE 20 MG: 10 INJECTION, SOLUTION INTRAVENOUS at 16:01

## 2024-06-26 RX ADMIN — GABAPENTIN 100 MG: 100 CAPSULE ORAL at 21:55

## 2024-06-26 RX ADMIN — DEXAMETHASONE SODIUM PHOSPHATE 10 MG: 10 INJECTION, SOLUTION INTRAMUSCULAR; INTRAVENOUS at 15:59

## 2024-06-26 RX ADMIN — ACETAMINOPHEN 1000 MG: 10 INJECTION INTRAVENOUS at 18:39

## 2024-06-26 RX ADMIN — LIDOCAINE HYDROCHLORIDE 5 ML: 10 INJECTION, SOLUTION EPIDURAL; INFILTRATION; INTRACAUDAL; PERINEURAL at 15:52

## 2024-06-26 RX ADMIN — SODIUM CHLORIDE: 0.9 INJECTION, SOLUTION INTRAVENOUS at 15:57

## 2024-06-26 RX ADMIN — Medication 20 MG: at 15:54

## 2024-06-26 RX ADMIN — PROPOFOL 50 MCG/KG/MIN: 10 INJECTION, EMULSION INTRAVENOUS at 16:05

## 2024-06-26 RX ADMIN — SODIUM CHLORIDE, SODIUM LACTATE, POTASSIUM CHLORIDE, AND CALCIUM CHLORIDE 125 ML/HR: .6; .31; .03; .02 INJECTION, SOLUTION INTRAVENOUS at 12:05

## 2024-06-26 RX ADMIN — MAGNESIUM SULFATE HEPTAHYDRATE 2 G: 40 INJECTION, SOLUTION INTRAVENOUS at 16:10

## 2024-06-26 RX ADMIN — ONDANSETRON 4 MG: 2 INJECTION INTRAMUSCULAR; INTRAVENOUS at 18:59

## 2024-06-26 RX ADMIN — ROCURONIUM BROMIDE 10 MG: 10 INJECTION, SOLUTION INTRAVENOUS at 18:25

## 2024-06-26 RX ADMIN — ROCURONIUM BROMIDE 20 MG: 10 INJECTION, SOLUTION INTRAVENOUS at 16:25

## 2024-06-26 RX ADMIN — GLYCOPYRROLATE 0.2 MG: 0.2 INJECTION, SOLUTION INTRAMUSCULAR; INTRAVENOUS at 16:10

## 2024-06-26 RX ADMIN — METRONIDAZOLE: 500 INJECTION, SOLUTION INTRAVENOUS at 16:08

## 2024-06-26 RX ADMIN — MEPERIDINE HYDROCHLORIDE 100 MG: 25 INJECTION INTRAMUSCULAR; INTRAVENOUS; SUBCUTANEOUS at 19:39

## 2024-06-26 RX ADMIN — DEXMEDETOMIDINE HYDROCHLORIDE 0.4 MCG/KG/HR: 100 INJECTION, SOLUTION INTRAVENOUS at 16:06

## 2024-06-26 RX ADMIN — CEFAZOLIN SODIUM 1000 MG: 1 SOLUTION INTRAVENOUS at 16:07

## 2024-06-26 RX ADMIN — SODIUM CHLORIDE, SODIUM LACTATE, POTASSIUM CHLORIDE, AND CALCIUM CHLORIDE: .6; .31; .03; .02 INJECTION, SOLUTION INTRAVENOUS at 15:49

## 2024-06-26 NOTE — ANESTHESIA POSTPROCEDURE EVALUATION
Post-Op Assessment Note    CV Status:  Stable  Pain Score: 0    Pain management: adequate       Mental Status:  Arousable and sleepy   PONV Controlled:  Controlled   Airway Patency:  Patent     Post Op Vitals Reviewed: Yes    No anethesia notable event occurred.    Staff: Anesthesiologist, CRNA               BP   115/66   Temp   98.1   Pulse  98   Resp   24   SpO2   98

## 2024-06-26 NOTE — ANESTHESIA PREPROCEDURE EVALUATION
Procedure:  RESECTION COLON RIGHT LAPAROSCOPIC, ileocecectomy (Abdomen)    Relevant Problems   HEMATOLOGY   (+) Anemia      Behavioral Health   (+) Cigarette nicotine dependence without complication      Blood   (+) Leukocytosis      Respiratory/Allergy   (+) Lung nodule      FEN/Gastrointestinal   (+) Crohn's disease of small intestine with abscess (HCC)   (+) Intestinal abscess      Other   (+) Clostridioides difficile infection        Physical Exam    Airway    Mallampati score: I  TM Distance: >3 FB  Neck ROM: full     Dental       Cardiovascular      Pulmonary      Other Findings      Anesthesia Plan  ASA Score- 2     Anesthesia Type- general with ASA Monitors.         Additional Monitors:     Airway Plan: ETT.           Plan Factors-    Chart reviewed. EKG reviewed.  Existing labs reviewed. Patient summary reviewed.    Patient is a current smoker.  Patient did not smoke on day of surgery.            Induction- intravenous.    Postoperative Plan- Plan for postoperative opioid use. Planned trial extubation    Perioperative Resuscitation Plan - Level 1 - Full Code.       Informed Consent- Anesthetic plan and risks discussed with patient.  I personally reviewed this patient with the CRNA. Discussed and agreed on the Anesthesia Plan with the CRNA..

## 2024-06-26 NOTE — INTERVAL H&P NOTE
H&P reviewed. After examining the patient I find no changes in the patients condition since the H&P had been written.    Vitals:    06/26/24 1143   BP: 103/63   Pulse: 61   Resp: 18   Temp: 98.1 °F (36.7 °C)   SpO2: 99%

## 2024-06-27 PROBLEM — F19.11 SUBSTANCE ABUSE IN REMISSION (HCC): Status: ACTIVE | Noted: 2024-06-27

## 2024-06-27 LAB
ANION GAP SERPL CALCULATED.3IONS-SCNC: 8 MMOL/L (ref 4–13)
BASOPHILS # BLD AUTO: 0.03 THOUSANDS/ÂΜL (ref 0–0.1)
BASOPHILS NFR BLD AUTO: 0 % (ref 0–1)
BUN SERPL-MCNC: 6 MG/DL (ref 5–25)
CALCIUM SERPL-MCNC: 8.9 MG/DL (ref 8.4–10.2)
CHLORIDE SERPL-SCNC: 103 MMOL/L (ref 96–108)
CO2 SERPL-SCNC: 25 MMOL/L (ref 21–32)
CREAT SERPL-MCNC: 0.64 MG/DL (ref 0.6–1.3)
EOSINOPHIL # BLD AUTO: 0 THOUSAND/ÂΜL (ref 0–0.61)
EOSINOPHIL NFR BLD AUTO: 0 % (ref 0–6)
ERYTHROCYTE [DISTWIDTH] IN BLOOD BY AUTOMATED COUNT: 15.3 % (ref 11.6–15.1)
GFR SERPL CREATININE-BSD FRML MDRD: 127 ML/MIN/1.73SQ M
GLUCOSE SERPL-MCNC: 111 MG/DL (ref 65–140)
HCT VFR BLD AUTO: 35.9 % (ref 36.5–49.3)
HGB BLD-MCNC: 11.6 G/DL (ref 12–17)
IMM GRANULOCYTES # BLD AUTO: 0.11 THOUSAND/UL (ref 0–0.2)
IMM GRANULOCYTES NFR BLD AUTO: 1 % (ref 0–2)
LYMPHOCYTES # BLD AUTO: 1.27 THOUSANDS/ÂΜL (ref 0.6–4.47)
LYMPHOCYTES NFR BLD AUTO: 7 % (ref 14–44)
MAGNESIUM SERPL-MCNC: 1.8 MG/DL (ref 1.9–2.7)
MCH RBC QN AUTO: 27.1 PG (ref 26.8–34.3)
MCHC RBC AUTO-ENTMCNC: 32.3 G/DL (ref 31.4–37.4)
MCV RBC AUTO: 84 FL (ref 82–98)
MONOCYTES # BLD AUTO: 1.15 THOUSAND/ÂΜL (ref 0.17–1.22)
MONOCYTES NFR BLD AUTO: 7 % (ref 4–12)
NEUTROPHILS # BLD AUTO: 15.25 THOUSANDS/ÂΜL (ref 1.85–7.62)
NEUTS SEG NFR BLD AUTO: 85 % (ref 43–75)
NRBC BLD AUTO-RTO: 0 /100 WBCS
PHOSPHATE SERPL-MCNC: 4.1 MG/DL (ref 2.7–4.5)
PLATELET # BLD AUTO: 308 THOUSANDS/UL (ref 149–390)
PMV BLD AUTO: 9.6 FL (ref 8.9–12.7)
POTASSIUM SERPL-SCNC: 4.2 MMOL/L (ref 3.5–5.3)
RBC # BLD AUTO: 4.28 MILLION/UL (ref 3.88–5.62)
SODIUM SERPL-SCNC: 136 MMOL/L (ref 135–147)
WBC # BLD AUTO: 17.81 THOUSAND/UL (ref 4.31–10.16)

## 2024-06-27 PROCEDURE — 80048 BASIC METABOLIC PNL TOTAL CA: CPT | Performed by: PHYSICIAN ASSISTANT

## 2024-06-27 PROCEDURE — 99223 1ST HOSP IP/OBS HIGH 75: CPT | Performed by: NURSE PRACTITIONER

## 2024-06-27 PROCEDURE — 99024 POSTOP FOLLOW-UP VISIT: CPT | Performed by: SURGERY

## 2024-06-27 PROCEDURE — 85025 COMPLETE CBC W/AUTO DIFF WBC: CPT | Performed by: PHYSICIAN ASSISTANT

## 2024-06-27 PROCEDURE — 84100 ASSAY OF PHOSPHORUS: CPT | Performed by: PHYSICIAN ASSISTANT

## 2024-06-27 PROCEDURE — 83735 ASSAY OF MAGNESIUM: CPT | Performed by: PHYSICIAN ASSISTANT

## 2024-06-27 RX ORDER — DEXTROSE MONOHYDRATE, SODIUM CHLORIDE, AND POTASSIUM CHLORIDE 50; 1.49; 4.5 G/1000ML; G/1000ML; G/1000ML
84 INJECTION, SOLUTION INTRAVENOUS CONTINUOUS
Status: DISCONTINUED | OUTPATIENT
Start: 2024-06-27 | End: 2024-06-29

## 2024-06-27 RX ORDER — MAGNESIUM SULFATE HEPTAHYDRATE 40 MG/ML
2 INJECTION, SOLUTION INTRAVENOUS ONCE
Status: COMPLETED | OUTPATIENT
Start: 2024-06-27 | End: 2024-06-27

## 2024-06-27 RX ADMIN — METRONIDAZOLE 500 MG: 500 INJECTION, SOLUTION INTRAVENOUS at 00:05

## 2024-06-27 RX ADMIN — MEPERIDINE HYDROCHLORIDE 50 MG: 50 INJECTION INTRAMUSCULAR; INTRAVENOUS; SUBCUTANEOUS at 00:04

## 2024-06-27 RX ADMIN — Medication 125 MG: at 09:23

## 2024-06-27 RX ADMIN — ENOXAPARIN SODIUM 40 MG: 40 INJECTION SUBCUTANEOUS at 09:28

## 2024-06-27 RX ADMIN — METRONIDAZOLE 500 MG: 500 INJECTION, SOLUTION INTRAVENOUS at 16:17

## 2024-06-27 RX ADMIN — ACETAMINOPHEN 1000 MG: 10 INJECTION INTRAVENOUS at 05:43

## 2024-06-27 RX ADMIN — CEFTRIAXONE 1000 MG: 1 INJECTION, POWDER, FOR SOLUTION INTRAMUSCULAR; INTRAVENOUS at 23:24

## 2024-06-27 RX ADMIN — GABAPENTIN 100 MG: 100 CAPSULE ORAL at 09:28

## 2024-06-27 RX ADMIN — ACETAMINOPHEN 1000 MG: 10 INJECTION INTRAVENOUS at 00:04

## 2024-06-27 RX ADMIN — METRONIDAZOLE 500 MG: 500 INJECTION, SOLUTION INTRAVENOUS at 23:33

## 2024-06-27 RX ADMIN — METHOCARBAMOL 1000 MG: 100 INJECTION INTRAMUSCULAR; INTRAVENOUS at 14:37

## 2024-06-27 RX ADMIN — DEXTROSE, SODIUM CHLORIDE, AND POTASSIUM CHLORIDE 84 ML/HR: 5; .45; .15 INJECTION INTRAVENOUS at 09:23

## 2024-06-27 RX ADMIN — ACETAMINOPHEN 1000 MG: 10 INJECTION INTRAVENOUS at 11:55

## 2024-06-27 RX ADMIN — GABAPENTIN 100 MG: 100 CAPSULE ORAL at 23:25

## 2024-06-27 RX ADMIN — DEXTROSE, SODIUM CHLORIDE, AND POTASSIUM CHLORIDE 84 ML/HR: 5; .45; .15 INJECTION INTRAVENOUS at 23:30

## 2024-06-27 RX ADMIN — GABAPENTIN 100 MG: 100 CAPSULE ORAL at 16:17

## 2024-06-27 RX ADMIN — Medication 125 MG: at 23:24

## 2024-06-27 RX ADMIN — ONDANSETRON 4 MG: 2 INJECTION INTRAMUSCULAR; INTRAVENOUS at 17:05

## 2024-06-27 RX ADMIN — MEPERIDINE HYDROCHLORIDE 50 MG: 50 INJECTION INTRAMUSCULAR; INTRAVENOUS; SUBCUTANEOUS at 04:53

## 2024-06-27 RX ADMIN — KETAMINE HYDROCHLORIDE 0.2 MG/KG/HR: 100 INJECTION INTRAMUSCULAR; INTRAVENOUS at 16:22

## 2024-06-27 RX ADMIN — MAGNESIUM SULFATE HEPTAHYDRATE 2 G: 40 INJECTION, SOLUTION INTRAVENOUS at 09:25

## 2024-06-27 RX ADMIN — METHOCARBAMOL 1000 MG: 100 INJECTION INTRAMUSCULAR; INTRAVENOUS at 04:53

## 2024-06-27 RX ADMIN — MEPERIDINE HYDROCHLORIDE 50 MG: 50 INJECTION INTRAMUSCULAR; INTRAVENOUS; SUBCUTANEOUS at 16:50

## 2024-06-27 RX ADMIN — METRONIDAZOLE 500 MG: 500 INJECTION, SOLUTION INTRAVENOUS at 07:47

## 2024-06-27 RX ADMIN — MEPERIDINE HYDROCHLORIDE 50 MG: 50 INJECTION INTRAMUSCULAR; INTRAVENOUS; SUBCUTANEOUS at 09:23

## 2024-06-27 NOTE — PHYSICAL THERAPY NOTE
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 No acute PT needs at this time. Will d/c pt from PT caseload. Encourage mobilization at least 3-4x/day while in hospital. Please re-consult if there is a change in pt's functional status. Thank you.

## 2024-06-27 NOTE — OCCUPATIONAL THERAPY NOTE
Occupational Therapy Screen       06/27/24 0815   OT Last Visit   OT Visit Date 06/27/24   Note Type   Note type Screen   Additional Comments Per RN, pt is a self within the room and ambulating w/o an AD. No acute OT needs identified at this time. Will d/c pt from OT caseload at this time. Please re-consult if there is a change in pt's fxnl status. Thank you.     ARIANNA Johnson, OTR/L

## 2024-06-27 NOTE — RESPIRATORY THERAPY NOTE
"RT Protocol Note  Victor Hugo Hurtado 33 y.o. male MRN: [id number]  Unit/Bed#: [facility] 913-01 Encounter: [id number]    Assessment    Active Problems:  There are no active Hospital Problems.      Home Pulmonary Medications:  N/a       Past Medical History:   Diagnosis Date    Crohn's colitis (HCC)      Social History     Socioeconomic History    Marital status: /Civil Union     Spouse name: None    Number of children: None    Years of education: None    Highest education level: None   Occupational History    None   Tobacco Use    Smoking status: Every Day     Current packs/day: 0.50     Types: Cigarettes    Smokeless tobacco: Never   Vaping Use    Vaping status: Never Used   Substance and Sexual Activity    Alcohol use: Not Currently    Drug use: Yes     Types: Marijuana     Comment: states 2 ounces/month, medical card    Sexual activity: Not Currently   Other Topics Concern    None   Social History Narrative    None     Social Determinants of Health     Financial Resource Strain: Not on file   Food Insecurity: Not on file   Transportation Needs: Not on file   Physical Activity: Not on file   Stress: Not on file   Social Connections: Not on file   Intimate Partner Violence: Not on file   Housing Stability: Not on file       Subjective         Objective    Physical Exam:   Assessment Type: Assess only  General Appearance: Awake  Respiratory Pattern: Normal  Chest Assessment: Chest expansion symmetrical  Bilateral Breath Sounds: Clear  O2 Device: (P) NC    Vitals:  Blood pressure 136/74, pulse (P) 77, temperature 98.1 °F (36.7 °C), temperature source Temporal, resp. rate (P) 15, height 5' 8\" (1.727 m), weight 56.7 kg (125 lb), SpO2 (P) 99%.          Imaging and other studies: I have personally reviewed pertinent reports.      O2 Device: (P) NC     Plan    Respiratory Plan: Discontinue Protocol        Resp Comments: (P) Pt ordered on Resp Protocol at this time. PT is Post op for non resp issues. Pt has no resp " hx other than +current smoker. PT takes no resp meds at home. PT has clear b/s and has no c/o any resp distress. PT sating within normal limits on 2L NC at this time. No recent cxr to look at. no issues with coughing. WIll d/c protocol no need for txs

## 2024-06-27 NOTE — ASSESSMENT & PLAN NOTE
Status post laparoscopic ileocecectomy on 6/26/2024.    Patient remains on clear liquid diet this morning.  + Nausea and vomiting. Will continue IV analgesic regimen for now and continue to monitor how the patient tolerates a diet and his bowel function.  Once he is tolerating a diet we will change his IV pain regimen to oral medications.    Multimodal analgesic regimen:  IV acetaminophen 1000 mg every 6 hours scheduled; renewed for 24 hours  Gabapentin 100 mg 3 times a day  Estimated Creatinine Clearance: 124.6 mL/min (by C-G formula based on SCr of 0.67 mg/dL).  Robaxin 1000 mg IV every 8 hours scheduled  Renewed for 24 hours  Continue ketamine infusion at 0.2 mg/kg/h as an opioid sparing analgesic  Continue as needed glycopyrrolate, Haldol and Ativan  Plan will be to discontinue infusion in the next 24 - 48 hours if tolerating PO  Narcan as needed for opioid reversal agent/respiratory depression  Demerol 50 mg IV every 4 hours as needed for moderate or severe pain    Once tolerating p.o. would recommend the following regimen:  Tylenol oral 975 mg every 8 hours scheduled  Gabapentin 100 mg 3 times a day  Robaxin-750 milligrams oral every 6 hours scheduled  Discontinue Demerol  Start Norco 1 tablet every 4 hours as needed for moderate or severe pain  Can consider increasing Norco to 2 tablets every 4 hours if pain is not controlled    Bowel management  Bowel regimen per surgical services

## 2024-06-27 NOTE — CERTIFIED RECOVERY SPECIALIST
Certified  Note    Patient name: Victor Hugo Hurtado  Location: [facility] 913/[facility] 913-01  [place]: [facility]  Attending:  Jana Calixto* MRN [id number]  : 1990  Age: 33 y.o.    Sex: male Date 2024         Substance Use History:     Social History     Substance and Sexual Activity   Alcohol Use Not Currently        Social History     Substance and Sexual Activity   Drug Use Yes    Types: Marijuana    Comment: states 2 ounces/month, medical card                    '          Naomie Dickinson

## 2024-06-27 NOTE — ASSESSMENT & PLAN NOTE
Patient has a history of substance use disorder in the past.  He had been using pills.  Now in recovery for many years  Consult certified  for ongoing recovery support.

## 2024-06-27 NOTE — PLAN OF CARE
Problem: PAIN - ADULT  Goal: Verbalizes/displays adequate comfort level or baseline comfort level  Description: Interventions:  - Encourage patient to monitor pain and request assistance  - Assess pain using appropriate pain scale  - Administer analgesics based on type and severity of pain and evaluate response  - Implement non-pharmacological measures as appropriate and evaluate response  - Consider cultural and social influences on pain and pain management  - Notify physician/advanced practitioner if interventions unsuccessful or patient reports new pain  Outcome: Progressing     Problem: INFECTION - ADULT  Goal: Absence or prevention of progression during hospitalization  Description: INTERVENTIONS:  - Assess and monitor for signs and symptoms of infection  - Monitor lab/diagnostic results  - Monitor all insertion sites, i.e. indwelling lines, tubes, and drains  - Monitor endotracheal if appropriate and nasal secretions for changes in amount and color  - [place] appropriate cooling/warming therapies per order  - Administer medications as ordered  - Instruct and encourage patient and family to use good hand hygiene technique  - Identify and instruct in appropriate isolation precautions for identified infection/condition  Outcome: Progressing

## 2024-06-27 NOTE — OP NOTE
OPERATIVE REPORT  PATIENT NAME: Victor Hugo Hurtado    :  1990  MRN: [id number]  Pt Location: BE OR ROOM 07    SURGERY DATE: 2024    Surgeons and Role:     * Jana Lugo MD - Primary     * Jose L Tomas DO - Assisting    Preop Diagnosis:  Crohn's disease of both small and large intestine with abscess (HCC) [K50.814]    Post-Op Diagnosis Codes:     * Crohn's disease of both small and large intestine with abscess (HCC) [K50.814]    Procedure(s):  HAND-ASSISTED LAPAROSCOPIC ILEOCECETOMY  SIGMOIDOSCOPY FLEXIBLE    Specimen(s):  ID Type Source Tests Collected by Time Destination   1 : Ileocecectomy Tissue Colon TISSUE EXAM Jana Lugo MD 2024 1755        Estimated Blood Loss:   Minimal    Drains:  Urethral Catheter Latex 16 Fr. (Active)   Reasons to continue Urinary Catheter  Accurate I&O assessment in critically ill patients (48 hr. max) 24   Site Assessment Skin intact;Clean 24   Collection Container Standard drainage bag 24   Securement Method Securing device (Describe) 24   Number of days: 0       Anesthesia Type:   General    Operative Indications:  Crohn's disease of both small and large intestine with abscess (HCC) [K50.814]    Operative Findings:  Large phlegmon and abscess cavity in right lower quadrant involving terminal ileum  Pericolonic inflammation at sigmoid colon from adjacent abscess  Primary side-to-side functional end-to-end anastomosis matured with 100 SHANTI stapler  Flexible sigmoidoscopy performed revealing healthy sigmoid colon and no bubbles on leak test    Complications:   None    Procedure and Technique:  The patient was identified by name and armband in the preoperative holding area.  Informed consent was reviewed.  He was then taken to the operating room, where general anesthesia was induced, and an endotracheal tube was placed by the anesthesiology team.  He was placed in lithotomy position  with all pressure points padded.  A Benoit catheter was placed under sterile conditions.  His abdomen was then prepped and draped in the usual sterile fashion.  A brief timeout was called.  All in the room were in agreement.    The abdomen was insufflated to a pressure of 15 mmHg via a Veress needle in a supraumbilical incision.  The abdomen was then entered using a 5 mm Optiview trocar with 0 degree scope under direct visualization.  No iatrogenic injuries were identified.  An additional 12 mm trocar was placed in the suprapubic region, and two 5 mm trocars were placed in the left lower quadrant and subxiphoid regions.  The patient was then placed in Trendelenburg with right side up.  There was a large amount of inflammation involving the terminal ileum with adjacent abscess.    Mobilization of the right colon and hepatic flexure was then carried out from a lateral to medial fashion.  An Enseal was used to incised lateral peritoneal and retroperitoneal attachments to the level of the hepatic flexure.  The duodenum was easily identified and preserved.  The transverse colon was then mobilized from a medial to lateral fashion to meet the lateral dissection plane.  The hepatic flexure was mobilized completely to allow reach to the midline.    Attention was then turned to the right lower quadrant and pelvic area.  Using primarily blunt dissection, the small bowel was dissected from an underlying interloop abscess in the terminal ileal mesentery.  The sigmoid colon was also gently dissected from this area.  No serosal tears or enterotomies were identified in this process.    The bowel was then exteriorized by elongating the previously made supra umbilical incision inferiorly.  A medium wound protector was placed.  The terminal ileum and cecum were easily delivered out of the abdomen.  An area of healthy ileum was identified just proximal to the inflammatory process.  Stay sutures were placed at this area in order to  fashion a side-to-side anastomosis with the cecum.  Enterotomies were made in the cecum and healthy small bowel.  The ends of a 100 SHANTI stapler with blue load were then placed in the lumens of the small bowel and the colon.  The stapler was fired, creating a common channel.  Mesenteric windows were made, and the common enterotomy was closed, and the bowel was transected using a single fire of the 100 SHANTI stapler.  The intervening mesentery was transected using a combination of Enseal device and clamping and tying off large vessels with a 2-0 Vicryl.  The specimen was then passed off the field.  The staple line was oversewn using a running 3-0 Vicryl suture.  A crotch stitch was placed using a 3-0 Vicryl suture.  The bowel was returned to the abdomen.    The abdomen was then irrigated using a total of 2 L warm saline solution.  Hemostasis was ensured.  The abdomen was then reinsufflated, and the patient was placed back in Trendelenburg position.  Fluid was placed in the pelvis, and the proximal sigmoid colon was occluded.  Flexible sigmoidoscopy was performed, revealing healthy colon and rectum mucosa up to the level of the sigmoid colon.  There was no bubbling noted in the pelvis.    The abdomen was inspected once again to ensure hemostasis.  The 12 mm trocar site was closed using an interrupted 0 Vicryl suture at the level of the anterior fascia with a suture passer.  Trocars were removed under direct visualization.  The midline abdominal wound was closed using 2x #1 PDS in a running fashion with a centrally secured knot.      A total of 20 cc of plain Exparel was used to infiltrate the fascia of the incisions under direct visualization.  Skin incisions were closed using 4-0 Monocryl.  The abdomen was cleansed and patted dry.  Incisions were dressed using skin glue.       I was present for the entire procedure.    Patient Disposition:  PACU     This procedure was not performed to treat colon cancer through  resection      SIGNATURE: Jana Lugo MD  DATE: June 26, 2024  TIME: 8:19 PM

## 2024-06-27 NOTE — PROGRESS NOTES
"Progress Note - Victor Hugo Hurtado 33 y.o. male MRN: [id number]    Unit/Bed#: [facility] 913-01 Encounter: [id number]      Assessment:  33M w/ Crohn's disease w/ abscess now s/p lap ileocecectomy on 6/26    AVSS  Uo-1.3L    Plan:  -Advance to clears/tst  -MIVF  -OOB and ambulate  -DVT ppx w/ lovenox  -APS for pain control, appreciate recommendations.  - Continue IV rocephin/flagyl x4 days for intra-abdominal abscess    Subjective:   Issues with rice overnight, dc'd and feeling much better. Urinating. No flatus or bms. Pain is controlled.    Objective:     Vitals: Blood pressure 132/84, pulse (!) 50, temperature (!) 97.3 °F (36.3 °C), resp. rate 14, height 5' 8\" (1.727 m), weight 56.7 kg (125 lb), SpO2 98%.,Body mass index is 19.01 kg/m².      Intake/Output Summary (Last 24 hours) at 6/27/2024 0613  Last data filed at 6/27/2024 0254  Gross per 24 hour   Intake 1700 ml   Output 1300 ml   Net 400 ml       Physical Exam:   General: NAD  HENT: NCAT MMM  Neck: supple, no JVD  CV: nl rate  Lungs: nl wob. No resp distress  ABD: Soft, appropriately tender, nondistended, incision c/d/i  Extrem: No CCE  Neuro: AAOx3       Invasive Devices       Peripheral Intravenous Line  Duration             Peripheral IV 06/26/24 Distal;Dorsal (posterior);Left Forearm <1 day    Peripheral IV 06/26/24 Dorsal (posterior);Left Hand <1 day              Drain  Duration             Urethral Catheter Latex 16 Fr. <1 day                      "

## 2024-06-27 NOTE — CONSULTS
Consultation - Acute Pain Service  Victor Hugo Hurtado 33 y.o. male MRN: [id number]  Unit/Bed#: [facility] 913-01 Encounter: [id number]               Victor Hugo Hurtado is a 33 y.o. male with a history of Crohn's disease is status post laparoscopic ileocecectomy.  Remains on IV antibiotics postoperatively for intra-abdominal abscess.    Substance abuse in remission ([facility])  Assessment & Plan  Patient has a history of substance use disorder in the past.  He had been using pills.  Now in recovery for many years  Consult certified  for ongoing recovery support.    Crohn's disease of small intestine with abscess ([facility])  Assessment & Plan  Status post laparoscopic ileocecectomy on 6/26/2024.    Patient started on clear liquid diet this morning.  Will continue IV analgesic regimen for now and continue to monitor how the patient tolerates a diet and his bowel function.  Once he is tolerating a diet we will change his IV pain regimen to oral medications.    Multimodal analgesic regimen:  IV acetaminophen 1000 mg every 6 hours scheduled  Gabapentin 100 mg 3 times a day  Robaxin 1000 mg IV every 8 hours scheduled  Increase ketamine infusion to 0.2 mg/kg/h as an opioid sparing analgesic  Continue as needed glycopyrrolate, Haldol and Ativan  Plan will be to discontinue infusion in the next 24 - 48 hours  Narcan as needed for opioid reversal agent/respiratory depression  Demerol 50 mg IV every 4 hours as needed for moderate or severe pain    Once tolerating p.o. would recommend the following regimen:  Tylenol oral 975 mg every 8 hours scheduled  Gabapentin 100 mg 3 times a day  Robaxin-750 milligrams oral every 6 hours scheduled  Discontinue Demerol  Start Norco 1 tablet every 4 hours as needed for moderate or severe pain  Can consider increasing Norco to 2 tablets every 4 hours if pain is not controlled    Bowel management  Bowel regimen per surgical services    Treatment plan has been reviewed with  "patient, bedside nursing staff and primary care service.    APS will continue to follow. Please contact Acute Pain Service - via [employer] from [phone] with additional questions or concerns. See [employer] or Carlos for additional contacts and after hours information.     History of Present Illness    Admit Date:  6/26/2024  Hospital Day:  1 day  Primary Service:  Colorectal  Attending Provider:  Jana Calixto*  Physician Requesting Consult: Jana Calixto*  Reason for Consult / Principal Problem: Postop pain  HPI: Victor Hugo Hurtado is a 33 y.o. year old male with a history of Crohn's disease is status post laparoscopic ileocecectomy.  Remains on IV antibiotics postoperatively for intra-abdominal abscess.    Current pain location(s): Pain Score: 8  Pain Location/Orientation: Location: Abdomen  Pain Scale: Pain Assessment Tool: 0-10  Current Analgesic regimen: At present time patient is resting in bed, no acute distress.  Abdominal pain is a 7-8 out of 10 and improves with IV meperidine as needed.  He was started on clear liquid diet this morning, was able to take some sips of water and apple juice.  Reports passing flatus.  Tolerating ketamine infusion, no reported adverse effects.  No headache, dizziness, nausea, vomiting, anxiety or bad dreams.  Asking to be transition to Vicodin as he wants to avoid \" heroin like products\" and oxycodone causes severe nausea and vomiting.    Reports a history of JEFF, has been in recovery for many years.      I have reviewed the patient's controlled substance dispensing history in the Prescription Drug Monitoring Program in compliance with the [place] regulations before prescribing any controlled substances.     Inpatient consult to Acute Pain Service  Consult performed by: MARLIN Wasserman  Consult ordered by: Tavon Leija MD          Review of Systems   Respiratory:  Negative for shortness of breath.    Cardiovascular:  Negative for chest pain. "   Gastrointestinal:  Positive for abdominal pain. Negative for nausea and vomiting.   Musculoskeletal:  Positive for back pain.   Neurological:  Negative for dizziness and headaches.   All other systems reviewed and are negative.      Historical Information   Past Medical History:   Diagnosis Date    Crohn's colitis (HCC)      Past Surgical History:   Procedure Laterality Date    COLONOSCOPY       Social History   Social History     Substance and Sexual Activity   Alcohol Use Not Currently     Social History     Substance and Sexual Activity   Drug Use Yes    Types: Marijuana    Comment: states 2 ounces/month, medical card     Social History     Tobacco Use   Smoking Status Every Day    Current packs/day: 0.50    Types: Cigarettes   Smokeless Tobacco Never     Family History: non-contributory    Meds/Allergies   all current active meds have been reviewed, current meds:   Current Facility-Administered Medications   Medication Dose Route Frequency    acetaminophen (Ofirmev) injection 1,000 mg  1,000 mg Intravenous Q6H ANNE    cefTRIAXone (ROCEPHIN) 1,000 mg in dextrose 5 % 50 mL IVPB  1,000 mg Intravenous Q24H    dextrose 5 % and sodium chloride 0.45 % with KCl 20 mEq/L infusion  84 mL/hr Intravenous Continuous    enoxaparin (LOVENOX) subcutaneous injection 40 mg  40 mg Subcutaneous Daily    gabapentin (NEURONTIN) capsule 100 mg  100 mg Oral TID    glycopyrrolate (ROBINUL) injection 0.2 mg  0.2 mg Intravenous Q4H PRN    haloperidol lactate (HALDOL) injection 2 mg  2 mg Intramuscular Q30 Min PRN    ketamine 250 mg (STANDARD CONCENTRATION) IV in sodium chloride 0.9% 250 mL  0.1 mg/kg/hr Intravenous Continuous    lactated ringers bolus 1,000 mL  1,000 mL Intravenous Once PRN    And    lactated ringers bolus 1,000 mL  1,000 mL Intravenous Once PRN    LORazepam (ATIVAN) injection 0.5 mg  0.5 mg Intravenous Q2H PRN    meperidine (DEMEROL) injection 50 mg  50 mg Intravenous Q4H PRN    methocarbamol (ROBAXIN) injection 1,000  mg  1,000 mg Intravenous Q8H ANNE    metroNIDAZOLE (FLAGYL) IVPB (premix) 500 mg 100 mL  500 mg Intravenous Q8H    naloxone (NARCAN) 0.04 mg/mL syringe 0.04 mg  0.04 mg Intravenous Q1MIN PRN    ondansetron (ZOFRAN) injection 4 mg  4 mg Intravenous Q4H PRN    sodium chloride 0.9 % bolus 1,000 mL  1,000 mL Intravenous Once PRN    And    sodium chloride 0.9 % bolus 1,000 mL  1,000 mL Intravenous Once PRN    vancomycin (VANCOCIN) oral solution 125 mg  125 mg Oral Q12H ANNE   , and PTA meds:   Prior to Admission Medications   Prescriptions Last Dose Informant Patient Reported? Taking?   Cholecalciferol (Vitamin D3) 50 MCG (2000 UT) capsule 6/19/2024 Self Yes Yes   Sig: Take 2,000 Units by mouth daily   vedolizumab 300 mg in sodium chloride 0.9 % 250 mL  Self Yes No   Sig: Inject 300 mg into a catheter in a vein once   Patient not taking: Reported on 6/7/2024      Facility-Administered Medications: None       Allergies   Allergen Reactions    Ammonia Nasal Congestion       Objective   Vitals:    06/27/24 0345 06/27/24 0717 06/27/24 0915 06/27/24 1053   BP: 132/84 132/84  127/81   BP Location:       Pulse: (!) 50 (!) 51  (!) 54   Resp: 14 16  17   Temp: (!) 97.3 °F (36.3 °C) 97.7 °F (36.5 °C)     TempSrc:       SpO2: 98% 97% 98% 97%   Weight:       Height:             Intake/Output Summary (Last 24 hours) at 6/27/2024 1128  Last data filed at 6/27/2024 0900  Gross per 24 hour   Intake 2058 ml   Output 1300 ml   Net 758 ml       Physical Exam  Vitals reviewed.   Constitutional:       General: He is awake. He is not in acute distress.     Appearance: He is not ill-appearing, toxic-appearing or diaphoretic.   HENT:      Head: Normocephalic and atraumatic.      Nose: Nose normal. No congestion or rhinorrhea.      Mouth/Throat:      Mouth: Mucous membranes are moist.   Eyes:      General:         Right eye: No discharge.         Left eye: No discharge.      Extraocular Movements: Extraocular movements intact.   Cardiovascular:       Rate and Rhythm: Regular rhythm. Bradycardia present.   Pulmonary:      Effort: Pulmonary effort is normal. No tachypnea, bradypnea or respiratory distress.      Breath sounds: Normal breath sounds. No wheezing or rhonchi.   Abdominal:      General: Bowel sounds are normal. There is no distension.      Palpations: Abdomen is soft.   Musculoskeletal:      Right lower leg: No edema.      Left lower leg: No edema.   Skin:     General: Skin is warm and dry.      Comments: Abdominal trocar sites open to air   Neurological:      Mental Status: He is alert and oriented to person, place, and time. Mental status is at baseline.   Psychiatric:         Attention and Perception: Attention normal.         Mood and Affect: Mood normal. Mood is not anxious.         Speech: Speech normal.         Behavior: Behavior normal. Behavior is not agitated. Behavior is cooperative.           Lab Results:  Estimated Creatinine Clearance: 130.4 mL/min (by C-G formula based on SCr of 0.64 mg/dL).  Lab Results   Component Value Date    WBC 17.81 (H) 06/27/2024    HGB 11.6 (L) 06/27/2024    HCT 35.9 (L) 06/27/2024     06/27/2024         Component Value Date/Time    K 4.2 06/27/2024 0535     06/27/2024 0535    CO2 25 06/27/2024 0535    BUN 6 06/27/2024 0535    CREATININE 0.64 06/27/2024 0535         Component Value Date/Time    CALCIUM 8.9 06/27/2024 0535    ALKPHOS 67 06/18/2024 1108    AST 17 06/18/2024 1108    ALT 18 06/18/2024 1108    TP 7.5 06/18/2024 1108    ALB 3.9 06/18/2024 1108       Imaging Studies/EKG: I have personally reviewed pertinent reports.      Counseling / Coordination of Care  Total floor / unit time spent today 55 minutes. Greater than 50% of total time was spent with the patient and / or family counseling and / or coordination of care. A description of the counseling / coordination of care: Chart review included allergies, vital signs, imaging reports, laboratory values, past medical history, progress notes,  current and home medications.  Discussed postoperative pain management which is inclusive of opioid and nonopioid medications, the use of adjunctive medications including ketamine infusion.  Treatment plan has been reviewed with patient, bedside nursing staff and primary care service.    The patient is currently on a ketamine drip to aid with pain control. Informed the patient of ketamine drip benefits in acute pain as well as potential side effects such as;  Hallucinations, dissociation, restlessness, increase in heart rate, blood pressure and secretions. Advised the patient that PRN medications such as lorazepam, Haloperidol and Glycopyrrolate will be available and used should any of these side effects occur. Explained to the patient the role of each PRN medication and the need to report any of the above mentioned side effects to staff.     Please note that the APS provides consultative services regarding pain management only.  With the exception of ketamine and epidural infusions and except when indicated, final decisions regarding starting or changing doses of analgesic medications are at the discretion of the consulting service.  MARLIN Wasserman  Acute Pain Service

## 2024-06-27 NOTE — UTILIZATION REVIEW
Initial Clinical Review    Elective IP surgical procedure  Age/Sex: 33 y.o. male  Surgery Date: 6/26/2024  Procedure: HAND-ASSISTED LAPAROSCOPIC ILEOCECETOMY  SIGMOIDOSCOPY FLEXIBLE  Anesthesia: General  Operative Findings:   Large phlegmon and abscess cavity in right lower quadrant involving terminal ileum  Pericolonic inflammation at sigmoid colon from adjacent abscess  Primary side-to-side functional end-to-end anastomosis matured with 100 SHANTI stapler  Flexible sigmoidoscopy performed revealing healthy sigmoid colon and no bubbles on leak test    POD#1 Progress Note: Issues with rice overnight, dc'd and feeling much better. Urinating. No flatus or bms. Pain is controlled. Abdomen soft, appropriately tender, nondistended, incision c/d/I. VSS, on RA. Advance to clears/toast. mIVFs. OOB/ambulate, incentive spirometry. Continue IV abx until POD 4 for intra-abdominal abscess. Multimodal analgesia/APS consulted d/t h/o narcotic abuse. Antiemetics prn.  SCDs.       Admission Orders: Date/Time/Statement:   Admission Orders (From admission, onward)       Ordered        06/26/24 1929  Inpatient Admission  Once                          Orders Placed This Encounter   Procedures    Inpatient Admission     Standing Status:   Standing     Number of Occurrences:   1     Order Specific Question:   Level of Care     Answer:   Med Surg [16]     Order Specific Question:   Estimated length of stay     Answer:   More than 2 Midnights     Order Specific Question:   Certification     Answer:   I certify that inpatient services are medically necessary for this patient for a duration of greater than two midnights. See H&P and MD Progress Notes for additional information about the patient's course of treatment.       Vital Signs (last 3 days)       Date/Time Temp Pulse Resp BP MAP (mmHg) SpO2 Calculated FIO2 (%) - Nasal Cannula Nasal Cannula O2 Flow Rate (L/min) O2 Device Cardiac (WDL) Patient Position - Orthostatic VS Pain    06/27/24  0923 -- -- -- -- -- -- -- -- -- -- -- 8 06/27/24 07:17:17 97.7 °F (36.5 °C) 51 16 132/84 100 97 % -- -- -- -- -- --    06/27/24 0453 -- -- -- -- -- -- -- -- -- -- -- 8 06/27/24 03:45:29 97.3 °F (36.3 °C) 50 14 132/84 100 98 % -- -- -- -- -- --    06/27/24 0004 -- -- -- -- -- -- -- -- -- -- -- 7 06/26/24 23:06:38 97.6 °F (36.4 °C) 68 -- 117/73 88 98 % -- -- -- -- -- --    06/26/24 2223 -- -- -- -- -- 98 % -- -- None (Room air) -- -- 7 06/26/24 20:42:20 97.2 °F (36.2 °C) 77 -- 126/76 93 98 % -- -- -- -- -- --    06/26/24 2035 -- 77 15 -- -- 99 % -- -- -- -- -- --    06/26/24 2007 -- 76 14 136/74 99 99 % 28 2 L/min Nasal cannula -- Lying --    06/26/24 2000 -- 79 16 134/77 100 99 % 28 2 L/min Nasal cannula -- Lying --    06/26/24 1945 -- 92 13 136/81 104 99 % -- -- None (Room air) -- Lying --    06/26/24 1939 -- 85 16 104/57 74 97 % -- -- None (Room air) -- -- 7 06/26/24 1930 -- -- 16 104/57 74 97 % -- -- None (Room air) -- Lying 7 06/26/24 1915 98.1 °F (36.7 °C) 87 18 115/66 -- 97 % 28 2 L/min Nasal cannula WDL Lying --    06/26/24 1157 -- -- -- -- -- -- -- -- -- -- -- 2    06/26/24 1143 98.1 °F (36.7 °C) 61 18 103/63 -- 99 % -- -- None (Room air) -- -- --          Weight (last 2 days)       Date/Time Weight    06/26/24 1157 56.7 (125)            Pertinent Labs/Diagnostic Test Results:     Results from last 7 days   Lab Units 06/27/24  0535   WBC Thousand/uL 17.81*   HEMOGLOBIN g/dL 11.6*   HEMATOCRIT % 35.9*   PLATELETS Thousands/uL 308   TOTAL NEUT ABS Thousands/µL 15.25*     Results from last 7 days   Lab Units 06/27/24  0535   SODIUM mmol/L 136   POTASSIUM mmol/L 4.2   CHLORIDE mmol/L 103   CO2 mmol/L 25   ANION GAP mmol/L 8   BUN mg/dL 6   CREATININE mg/dL 0.64   EGFR ml/min/1.73sq m 127   CALCIUM mg/dL 8.9   MAGNESIUM mg/dL 1.8*   PHOSPHORUS mg/dL 4.1     Results from last 7 days   Lab Units 06/27/24  0535   GLUCOSE RANDOM mg/dL 111     Scheduled Medications:  acetaminophen, 1,000 mg,  Intravenous, Q6H ANNE  cefTRIAXone, 1,000 mg, Intravenous, Q24H  enoxaparin, 40 mg, Subcutaneous, Daily  gabapentin, 100 mg, Oral, TID  magnesium sulfate, 2 g, Intravenous, Once  methocarbamol, 1,000 mg, Intravenous, Q8H ANNE  metroNIDAZOLE, 500 mg, Intravenous, Q8H  vancomycin, 125 mg, Oral, Q12H ANNE    Continuous IV Infusions:  dextrose 5 % and sodium chloride 0.45 % with KCl 20 mEq/L, 84 mL/hr, Intravenous, Continuous  ketamine, 0.1 mg/kg/hr, Intravenous, Continuous    PRN Meds:  glycopyrrolate, 0.2 mg, Intravenous, Q4H PRN  haloperidol lactate, 2 mg, Intramuscular, Q30 Min PRN  lactated ringers, 1,000 mL, Intravenous, Once PRN  LORazepam, 0.5 mg, Intravenous, Q2H PRN  meperidine, 50 mg, Intravenous, Q4H PRN 6/27 x3  ondansetron, 4 mg, Intravenous, Q4H PRN  sodium chloride, 1,000 mL, Intravenous, Once PRN        Network Utilization Review Department  ATTENTION: Please call with any questions or concerns to [phone] and carefully listen to the prompts so that you are directed to the right person. All voicemails are confidential.   For Discharge needs, contact Care Management DC Support Team at [phone] opt. [phone]  Send all requests for admission clinical reviews, approved or denied determinations and any other requests to dedicated fax number below belonging to the campus where the patient is receiving treatment. List of dedicated fax numbers for the Facilities:  FACILITY NAME UR FAX NUMBER   ADMISSION DENIALS (Administrative/Medical Necessity) [phone]   DISCHARGE SUPPORT TEAM (NETWORK) [phone]   PARENT CHILD HEALTH (Maternity/NICU/Pediatrics) [phone]   [facility] [phone]   [facility] [phone]   [facility] [phone]   [facility] [phone]   [facility] [phone]   [facility] [phone]   [facility]  [phone]   [facility] [phone]   [facility] [phone]   [facility] [phone]   [facility] [phone]   [facility] [phone]

## 2024-06-27 NOTE — CASE MANAGEMENT
Case Management Assessment & Discharge Planning Note    Patient name Victor Hugo Hurtado  Location [facility] 913/[facility] 913-01 MRN [id number]  : 1990 Date 2024       Current Admission Date: 2024  Current Admission Diagnosis:Crohn's disease of both small and large intestine with abscess (HCC)   Patient Active Problem List    Diagnosis Date Noted Date Diagnosed    Leukocytosis 2024     Crohn's disease of small intestine with abscess (HCC) 2024     Clostridioides difficile infection 2023     Anemia 2023     Lung nodule 2023     Intestinal abscess 10/26/2023     Cigarette nicotine dependence without complication 10/26/2023       LOS (days): 1  Geometric Mean LOS (GMLOS) (days):   Days to GMLOS:     OBJECTIVE:    Risk of Unplanned Readmission Score: 14.05         Current admission status: Inpatient  Referral Reason: Other (dispo planning)    Preferred Pharmacy:   [facility]/pharmacy #[id number] - [place], [place] - [address] [address]  [address] [place] [address]  Phone: [phone] Fax: [phone]    Primary Care Provider: Douglas C Shoenberger, MD    Primary Insurance: BLUE CROSS  Secondary Insurance:     ASSESSMENT:  Active Health Care Proxies    There are no active Health Care Proxies on file.                 Readmission Root Cause  30 Day Readmission: No    Patient Information  Admitted from:: Home  Mental Status: Alert  During Assessment patient was accompanied by: Not accompanied during assessment  Assessment information provided by:: Patient  Primary Caregiver: Self  Support Systems: Self, Spouse/significant other  County of Residence: [place]  What [facility] do you live in?: [place]  Home entry access options. Select all that apply.: Stairs  Number of steps to enter home.: 2  Type of Current Residence: 2 story home  Upon entering residence, is there a bedroom on the main floor (no further steps)?: No  A bedroom is located on the following floor levels of  residence (select all that apply):: 2nd Floor  Upon entering residence, is there a bathroom on the main floor (no further steps)?: Yes  Number of steps to 2nd floor from main floor: One Flight  Living Arrangements: Lives w/ Spouse/significant other  Is patient a ?: No    Activities of Daily Living Prior to Admission  Functional Status: Independent  Completes ADLs independently?: Yes  Ambulates independently?: Yes  Does patient use assisted devices?: No  Does patient currently own DME?: No  Does patient have a history of Outpatient Therapy (PT/OT)?: No  Does the patient have a history of Short-Term Rehab?: No  Does patient have a history of HHC?: No  Does patient currently have HHC?: No         Patient Information Continued  Income Source: Employed  Does patient have prescription coverage?: Yes  Does patient receive dialysis treatments?: No  Does patient have a history of substance abuse?: Yes  Historical substance use preference: Heroin  Is patient currently in treatment for substance abuse?: N/A - sober (Pt has been clean for 17 years)  Does patient have a history of Mental Health Diagnosis?: No         Means of Transportation  Means of Transport to Appts:: Drives Self      Social Determinants of Health (SDOH)      Flowsheet Row Most Recent Value   Housing Stability    In the last 12 months, was there a time when you were not able to pay the mortgage or rent on time? N   In the past 12 months, how many times have you moved where you were living? 0   At any time in the past 12 months, were you homeless or living in a shelter (including now)? N   Transportation Needs    In the past 12 months, has lack of transportation kept you from medical appointments or from getting medications? no   In the past 12 months, has lack of transportation kept you from meetings, work, or from getting things needed for daily living? No   Food Insecurity    Within the past 12 months, you worried that your food would run out before  you got the money to buy more. Never true   Within the past 12 months, the food you bought just didn't last and you didn't have money to get more. Never true   Utilities    In the past 12 months has the electric, gas, oil, or water company threatened to shut off services in your home? No            DISCHARGE DETAILS:    Discharge planning discussed with:: Pt  Freedom of Choice: Yes     CM contacted family/caregiver?: No- see comments (Pt alert and oriented)  Were Treatment Team discharge recommendations reviewed with patient/caregiver?: Yes  Did patient/caregiver verbalize understanding of patient care needs?: Yes  Were patient/caregiver advised of the risks associated with not following Treatment Team discharge recommendations?: Yes                             Treatment Team Recommendation: Home  Discharge Destination Plan:: Home                 CM met with pt to complete assessment and explain CM role.  Pt resides with his wife and daughter in a 2 story home, has 2 RAY.  Pt's bedroom is on 2nd floor and has bathroom on both floors.  Pt reports being independent with all ADL's prior to admission.  Pt has no DME.  Pt denies any outpatient PT, STR or HHC.  Pt denies any mental health.  Pt reports he has a hx of heroin use, last used 17 years ago.  Pt denies any previous inpatient rehab.  Pt is self employed and drives.  CM will continue to follow for discharge planning needs.

## 2024-06-27 NOTE — QUICK NOTE
Post Op Check:    Complaining of pain related to rice. Otherwsie doing ok. No nausea or vomiting. No fevers/chills.    General: NAD  HENT: NCAT MMM  Neck: supple, no JVD  CV: nl rate  Lungs: nl wob. No resp distress  ABD: Soft, nontender, nondistendedm incisions c/d/I  : rice in place with clear urine  Extrem: No CCE  Neuro: AAOx3     Plan:  Diet Clear Liquid  Continue to monitor  Pain and nausea control PRN    Jose L Tomas, DO  Surgery, PGY-4

## 2024-06-27 NOTE — CERTIFIED RECOVERY SPECIALIST
"   Certified  Note    Patient name: Victor Hugo Hurtado  Location: [facility] 913/[facility] 913-01  [place]: [facility]  Attending:  Jana Calixto* MRN [id number]  : 1990  Age: 33 y.o.    Sex: male Date 2024         Substance Use History:     Social History     Substance and Sexual Activity   Alcohol Use Not Currently        Social History     Substance and Sexual Activity   Drug Use Yes    Types: Marijuana    Comment: states 2 ounces/month, medical card     CRS met with patient (wife was present) and he seemed to be in good mood. He looked a little uncomfortable with his incision. Patient was very open about his past methamphetamine addiction and said he \"quit and did it all by himself with no help\" He didn't go to treatment but has been \"clean and sober for 17yrs, Rajan 1 was his clean (from Meth) date. He does admit to using cocaine once in a while but he can take it or leave it. He doesn't believe he needs treatment and prefers only lowest form of pain medicine to help with his surgery pain. His wife said she has been adament about him stopping the other drugs since their daughter was born. They want her to have a good life without drugs.                '          Naomie Dickinson       "

## 2024-06-28 LAB
ANION GAP SERPL CALCULATED.3IONS-SCNC: 9 MMOL/L (ref 4–13)
BASOPHILS # BLD AUTO: 0.02 THOUSANDS/ÂΜL (ref 0–0.1)
BASOPHILS NFR BLD AUTO: 0 % (ref 0–1)
BUN SERPL-MCNC: 5 MG/DL (ref 5–25)
CALCIUM SERPL-MCNC: 9.2 MG/DL (ref 8.4–10.2)
CHLORIDE SERPL-SCNC: 100 MMOL/L (ref 96–108)
CO2 SERPL-SCNC: 30 MMOL/L (ref 21–32)
CREAT SERPL-MCNC: 0.67 MG/DL (ref 0.6–1.3)
EOSINOPHIL # BLD AUTO: 0.19 THOUSAND/ÂΜL (ref 0–0.61)
EOSINOPHIL NFR BLD AUTO: 2 % (ref 0–6)
ERYTHROCYTE [DISTWIDTH] IN BLOOD BY AUTOMATED COUNT: 15.9 % (ref 11.6–15.1)
GFR SERPL CREATININE-BSD FRML MDRD: 125 ML/MIN/1.73SQ M
GLUCOSE SERPL-MCNC: 137 MG/DL (ref 65–140)
HCT VFR BLD AUTO: 37.8 % (ref 36.5–49.3)
HGB BLD-MCNC: 12 G/DL (ref 12–17)
IMM GRANULOCYTES # BLD AUTO: 0.03 THOUSAND/UL (ref 0–0.2)
IMM GRANULOCYTES NFR BLD AUTO: 0 % (ref 0–2)
LYMPHOCYTES # BLD AUTO: 1.19 THOUSANDS/ÂΜL (ref 0.6–4.47)
LYMPHOCYTES NFR BLD AUTO: 11 % (ref 14–44)
MCH RBC QN AUTO: 26.8 PG (ref 26.8–34.3)
MCHC RBC AUTO-ENTMCNC: 31.7 G/DL (ref 31.4–37.4)
MCV RBC AUTO: 84 FL (ref 82–98)
MONOCYTES # BLD AUTO: 1.03 THOUSAND/ÂΜL (ref 0.17–1.22)
MONOCYTES NFR BLD AUTO: 10 % (ref 4–12)
NEUTROPHILS # BLD AUTO: 8.42 THOUSANDS/ÂΜL (ref 1.85–7.62)
NEUTS SEG NFR BLD AUTO: 77 % (ref 43–75)
NRBC BLD AUTO-RTO: 0 /100 WBCS
PLATELET # BLD AUTO: 340 THOUSANDS/UL (ref 149–390)
PMV BLD AUTO: 9.6 FL (ref 8.9–12.7)
POTASSIUM SERPL-SCNC: 4.1 MMOL/L (ref 3.5–5.3)
RBC # BLD AUTO: 4.48 MILLION/UL (ref 3.88–5.62)
SODIUM SERPL-SCNC: 139 MMOL/L (ref 135–147)
WBC # BLD AUTO: 10.88 THOUSAND/UL (ref 4.31–10.16)

## 2024-06-28 PROCEDURE — 99024 POSTOP FOLLOW-UP VISIT: CPT | Performed by: SURGERY

## 2024-06-28 PROCEDURE — 99232 SBSQ HOSP IP/OBS MODERATE 35: CPT | Performed by: ANESTHESIOLOGY

## 2024-06-28 PROCEDURE — 99233 SBSQ HOSP IP/OBS HIGH 50: CPT | Performed by: NURSE PRACTITIONER

## 2024-06-28 PROCEDURE — 80048 BASIC METABOLIC PNL TOTAL CA: CPT | Performed by: PHYSICIAN ASSISTANT

## 2024-06-28 PROCEDURE — 93005 ELECTROCARDIOGRAM TRACING: CPT

## 2024-06-28 PROCEDURE — 85025 COMPLETE CBC W/AUTO DIFF WBC: CPT | Performed by: PHYSICIAN ASSISTANT

## 2024-06-28 RX ORDER — METOCLOPRAMIDE HYDROCHLORIDE 5 MG/ML
10 INJECTION INTRAMUSCULAR; INTRAVENOUS EVERY 6 HOURS PRN
Status: DISCONTINUED | OUTPATIENT
Start: 2024-06-28 | End: 2024-06-29 | Stop reason: HOSPADM

## 2024-06-28 RX ORDER — METHOCARBAMOL 100 MG/ML
1000 INJECTION, SOLUTION INTRAMUSCULAR; INTRAVENOUS EVERY 8 HOURS SCHEDULED
Status: DISPENSED | OUTPATIENT
Start: 2024-06-28 | End: 2024-06-29

## 2024-06-28 RX ADMIN — GABAPENTIN 100 MG: 100 CAPSULE ORAL at 08:48

## 2024-06-28 RX ADMIN — ACETAMINOPHEN 1000 MG: 10 INJECTION INTRAVENOUS at 13:02

## 2024-06-28 RX ADMIN — Medication 125 MG: at 09:02

## 2024-06-28 RX ADMIN — ACETAMINOPHEN 1000 MG: 10 INJECTION INTRAVENOUS at 06:26

## 2024-06-28 RX ADMIN — METHOCARBAMOL 1000 MG: 100 INJECTION INTRAMUSCULAR; INTRAVENOUS at 00:24

## 2024-06-28 RX ADMIN — DEXTROSE, SODIUM CHLORIDE, AND POTASSIUM CHLORIDE 84 ML/HR: 5; .45; .15 INJECTION INTRAVENOUS at 13:02

## 2024-06-28 RX ADMIN — GABAPENTIN 100 MG: 100 CAPSULE ORAL at 23:05

## 2024-06-28 RX ADMIN — ONDANSETRON 4 MG: 2 INJECTION INTRAMUSCULAR; INTRAVENOUS at 06:26

## 2024-06-28 RX ADMIN — GABAPENTIN 100 MG: 100 CAPSULE ORAL at 17:37

## 2024-06-28 RX ADMIN — CEFTRIAXONE 1000 MG: 1 INJECTION, POWDER, FOR SOLUTION INTRAMUSCULAR; INTRAVENOUS at 23:05

## 2024-06-28 RX ADMIN — ACETAMINOPHEN 1000 MG: 10 INJECTION INTRAVENOUS at 00:24

## 2024-06-28 RX ADMIN — METRONIDAZOLE 500 MG: 500 INJECTION, SOLUTION INTRAVENOUS at 08:49

## 2024-06-28 RX ADMIN — METRONIDAZOLE 500 MG: 500 INJECTION, SOLUTION INTRAVENOUS at 17:37

## 2024-06-28 RX ADMIN — METHOCARBAMOL 1000 MG: 100 INJECTION INTRAMUSCULAR; INTRAVENOUS at 23:10

## 2024-06-28 RX ADMIN — ENOXAPARIN SODIUM 40 MG: 40 INJECTION SUBCUTANEOUS at 08:48

## 2024-06-28 RX ADMIN — Medication 125 MG: at 23:05

## 2024-06-28 RX ADMIN — METHOCARBAMOL 1000 MG: 100 INJECTION INTRAMUSCULAR; INTRAVENOUS at 14:30

## 2024-06-28 RX ADMIN — ACETAMINOPHEN 1000 MG: 10 INJECTION INTRAVENOUS at 18:56

## 2024-06-28 NOTE — PROGRESS NOTES
"Progress Note - Victor Hugo Hurtado 33 y.o. male MRN: [id number]    Unit/Bed#: [facility] 913-01 Encounter: [id number]      Assessment:  33M w/ Crohn's disease w/ abscess now s/p lap ileocecectomy on 6/26    AVSS  Uo-not recorded    Plan:  -Cont CTC  -Ketamine gtt per APS; appreciate recs  -MIVF  -OOB and ambulate  -DVT ppx w/ lovenox  -Continue IV rocephin/flagyl x4 days for intra-abdominal abscess    Subjective:   Seen and examined. Some nausea ovn with 2 episodes of emesis. Ambulating without issue. Denies bowel function    Objective:     Vitals: Blood pressure 128/85, pulse 68, temperature 98 °F (36.7 °C), resp. rate 16, height 5' 8\" (1.727 m), weight 56.7 kg (125 lb), SpO2 96%.,Body mass index is 19.01 kg/m².      Intake/Output Summary (Last 24 hours) at 6/28/2024 0632  Last data filed at 6/28/2024 0400  Gross per 24 hour   Intake 476 ml   Output 275 ml   Net 201 ml       Physical Exam:   General: NAD  HENT: NCAT MMM  Neck: supple, no JVD  CV: nl rate  Lungs: nl wob. No resp distress  ABD: Soft, appropriately tender, nondistended, incision c/d/i  Extrem: No CCE  Neuro: AAOx3       Invasive Devices       Peripheral Intravenous Line  Duration             Peripheral IV 06/26/24 Distal;Dorsal (posterior);Left Forearm 1 day    Peripheral IV 06/26/24 Dorsal (posterior);Left Hand 1 day                      "

## 2024-06-28 NOTE — PROGRESS NOTES
Progress Note - Acute Pain Service    Victor Hugo Hurtado 33 y.o. male MRN: [id number]  Unit/Bed#: [facility] 913-01 Encounter: [id number]      Victor Hugo Hurtado is a 33 y.o. male with a history of Crohn's disease is status post laparoscopic ileocecectomy. Remains on IV antibiotics postoperatively for intra-abdominal abscess.     Substance abuse in remission ([facility])  Assessment & Plan  Patient has a history of substance use disorder in the past.  He had been using pills.  Now in recovery for many years  Consult certified  for ongoing recovery support.    Crohn's disease of small intestine with abscess ([facility])  Assessment & Plan  Status post laparoscopic ileocecectomy on 6/26/2024.    Patient remains on clear liquid diet this morning.  + Nausea and vomiting. Will continue IV analgesic regimen for now and continue to monitor how the patient tolerates a diet and his bowel function.  Once he is tolerating a diet we will change his IV pain regimen to oral medications.    Multimodal analgesic regimen:  IV acetaminophen 1000 mg every 6 hours scheduled; renewed for 24 hours  Gabapentin 100 mg 3 times a day  Estimated Creatinine Clearance: 124.6 mL/min (by C-G formula based on SCr of 0.67 mg/dL).  Robaxin 1000 mg IV every 8 hours scheduled  Renewed for 24 hours  Continue ketamine infusion at 0.2 mg/kg/h as an opioid sparing analgesic  Continue as needed glycopyrrolate, Haldol and Ativan  Plan will be to discontinue infusion in the next 24 - 48 hours if tolerating PO  Narcan as needed for opioid reversal agent/respiratory depression  Demerol 50 mg IV every 4 hours as needed for moderate or severe pain    Once tolerating p.o. would recommend the following regimen:  Tylenol oral 975 mg every 8 hours scheduled  Gabapentin 100 mg 3 times a day  Robaxin-750 milligrams oral every 6 hours scheduled  Discontinue Demerol  Start Norco 1 tablet every 4 hours as needed for moderate or severe pain  Can consider  increasing Norco to 2 tablets every 4 hours if pain is not controlled    Bowel management  Bowel regimen per surgical services      Treatment plan has been reviewed with patient, bedside nursing staff and primary care service.  Will continue IV analgesic regimen for now given nausea and vomiting, plan to start oral analgesic regimen once tolerating p.o. and return of bowel function.    APS will continue to follow. Please contact Acute Pain Service - via [employer] from [phone] with additional questions or concerns. See [employer] or Carlos for additional contacts and after hours information.     Pain History  Current pain location(s):  Pain Score: 0  Pain Location/Orientation: Orientation: Right, Location: Abdomen, Location: Rib Cage  Pain Scale: Pain Assessment Tool: 0-10  24 hour history: Patient resting in bed, reporting pain in his abdomen at the trocar sites as well as under his rib cage which has improved since early this morning.  He is tolerating current analgesic regimen, and no headaches, dizziness, anxiety or bad dreams given ketamine infusion.  He does report improvement in pain after IV Demerol.  In the last 24 hours has been reporting feelings of nausea with reported 3 episodes of vomiting.    Opioid requirement previous 24 hours: Demerol 100mg     Meds/Allergies   all current active meds have been reviewed and current meds:   Current Facility-Administered Medications   Medication Dose Route Frequency    acetaminophen (Ofirmev) injection 1,000 mg  1,000 mg Intravenous Q6H ANNE    cefTRIAXone (ROCEPHIN) 1,000 mg in dextrose 5 % 50 mL IVPB  1,000 mg Intravenous Q24H    dextrose 5 % and sodium chloride 0.45 % with KCl 20 mEq/L infusion  84 mL/hr Intravenous Continuous    enoxaparin (LOVENOX) subcutaneous injection 40 mg  40 mg Subcutaneous Daily    gabapentin (NEURONTIN) capsule 100 mg  100 mg Oral TID    glycopyrrolate (ROBINUL) injection 0.2 mg  0.2 mg Intravenous Q4H PRN    haloperidol lactate (HALDOL)  injection 2 mg  2 mg Intramuscular Q30 Min PRN    ketamine 250 mg (STANDARD CONCENTRATION) IV in sodium chloride 0.9% 250 mL  0.2 mg/kg/hr Intravenous Continuous    LORazepam (ATIVAN) injection 0.5 mg  0.5 mg Intravenous Q2H PRN    methocarbamol (ROBAXIN) injection 1,000 mg  1,000 mg Intravenous Q8H ANNE    metroNIDAZOLE (FLAGYL) IVPB (premix) 500 mg 100 mL  500 mg Intravenous Q8H    naloxone (NARCAN) 0.04 mg/mL syringe 0.04 mg  0.04 mg Intravenous Q1MIN PRN    ondansetron (ZOFRAN) injection 4 mg  4 mg Intravenous Q4H PRN    vancomycin (VANCOCIN) oral solution 125 mg  125 mg Oral Q12H ANNE       Allergies   Allergen Reactions    Ammonia Nasal Congestion       Objective        Vitals:    06/27/24 2205 06/28/24 0303 06/28/24 0701 06/28/24 0755   BP: 126/88 128/85 134/90    BP Location:       Pulse: 61 68 59 59   Resp: 16 16 18 17   Temp: 98 °F (36.7 °C) 98 °F (36.7 °C)  98 °F (36.7 °C)   TempSrc:       SpO2: 98% 96% 99% 96%   Weight:       Height:             Physical Exam  Vitals reviewed.   Constitutional:       General: He is awake. He is not in acute distress.     Appearance: Normal appearance. He is not ill-appearing, toxic-appearing or diaphoretic.   HENT:      Head: Normocephalic and atraumatic.      Nose: Nose normal. No congestion or rhinorrhea.      Mouth/Throat:      Mouth: Mucous membranes are moist.   Eyes:      Extraocular Movements: Extraocular movements intact.   Cardiovascular:      Rate and Rhythm: Normal rate and regular rhythm.   Pulmonary:      Effort: Pulmonary effort is normal. No tachypnea, bradypnea or respiratory distress.      Breath sounds: No wheezing or rhonchi.   Abdominal:      General: Bowel sounds are decreased. There is no distension.      Palpations: Abdomen is soft.   Skin:     General: Skin is warm and dry.      Coloration: Skin is not pale.   Neurological:      Mental Status: He is alert and oriented to person, place, and time. Mental status is at baseline.   Psychiatric:          Attention and Perception: Attention normal.         Mood and Affect: Mood normal. Mood is not anxious.         Speech: Speech normal.         Behavior: Behavior normal. Behavior is not agitated. Behavior is cooperative.           Lab Results:   Estimated Creatinine Clearance: 124.6 mL/min (by C-G formula based on SCr of 0.67 mg/dL).  Lab Results   Component Value Date    WBC 10.88 (H) 06/28/2024    HGB 12.0 06/28/2024    HCT 37.8 06/28/2024     06/28/2024         Component Value Date/Time    K 4.1 06/28/2024 0538     06/28/2024 0538    CO2 30 06/28/2024 0538    BUN 5 06/28/2024 0538    CREATININE 0.67 06/28/2024 0538         Component Value Date/Time    CALCIUM 9.2 06/28/2024 0538    ALKPHOS 67 06/18/2024 1108    AST 17 06/18/2024 1108    ALT 18 06/18/2024 1108    TP 7.5 06/18/2024 1108    ALB 3.9 06/18/2024 1108         Please note that the APS provides consultative services regarding pain management only.  With the exception of ketamine and epidural infusions and except when indicated, final decisions regarding starting or changing doses of analgesic medications are at the discretion of the consulting service.    MARLIN Wasserman   Acute Pain Service

## 2024-06-28 NOTE — QUICK NOTE
Called by bedside RN, patient requesting ketamine to be discontinued.  He is tolerating the infusion well, states he would like it to be discontinued given that he overall feels well and his pain is improving.  Will discontinue ketamine infusion today and continue to monitor his overall level of pain.    MARLIN Garcia  Acute Pain Service

## 2024-06-29 VITALS
HEART RATE: 60 BPM | DIASTOLIC BLOOD PRESSURE: 80 MMHG | TEMPERATURE: 98 F | BODY MASS INDEX: 18.94 KG/M2 | OXYGEN SATURATION: 100 % | HEIGHT: 68 IN | WEIGHT: 125 LBS | SYSTOLIC BLOOD PRESSURE: 126 MMHG | RESPIRATION RATE: 17 BRPM

## 2024-06-29 LAB
ANION GAP SERPL CALCULATED.3IONS-SCNC: 10 MMOL/L (ref 4–13)
BASOPHILS # BLD AUTO: 0.05 THOUSANDS/ÂΜL (ref 0–0.1)
BASOPHILS NFR BLD AUTO: 0 % (ref 0–1)
BUN SERPL-MCNC: 8 MG/DL (ref 5–25)
CALCIUM SERPL-MCNC: 9.4 MG/DL (ref 8.4–10.2)
CHLORIDE SERPL-SCNC: 101 MMOL/L (ref 96–108)
CO2 SERPL-SCNC: 27 MMOL/L (ref 21–32)
CREAT SERPL-MCNC: 0.69 MG/DL (ref 0.6–1.3)
EOSINOPHIL # BLD AUTO: 0.64 THOUSAND/ÂΜL (ref 0–0.61)
EOSINOPHIL NFR BLD AUTO: 6 % (ref 0–6)
ERYTHROCYTE [DISTWIDTH] IN BLOOD BY AUTOMATED COUNT: 16 % (ref 11.6–15.1)
GFR SERPL CREATININE-BSD FRML MDRD: 123 ML/MIN/1.73SQ M
GLUCOSE SERPL-MCNC: 110 MG/DL (ref 65–140)
HCT VFR BLD AUTO: 37.1 % (ref 36.5–49.3)
HGB BLD-MCNC: 11.9 G/DL (ref 12–17)
IMM GRANULOCYTES # BLD AUTO: 0.04 THOUSAND/UL (ref 0–0.2)
IMM GRANULOCYTES NFR BLD AUTO: 0 % (ref 0–2)
LYMPHOCYTES # BLD AUTO: 2.31 THOUSANDS/ÂΜL (ref 0.6–4.47)
LYMPHOCYTES NFR BLD AUTO: 20 % (ref 14–44)
MCH RBC QN AUTO: 27.2 PG (ref 26.8–34.3)
MCHC RBC AUTO-ENTMCNC: 32.1 G/DL (ref 31.4–37.4)
MCV RBC AUTO: 85 FL (ref 82–98)
MONOCYTES # BLD AUTO: 1.25 THOUSAND/ÂΜL (ref 0.17–1.22)
MONOCYTES NFR BLD AUTO: 11 % (ref 4–12)
NEUTROPHILS # BLD AUTO: 7.03 THOUSANDS/ÂΜL (ref 1.85–7.62)
NEUTS SEG NFR BLD AUTO: 63 % (ref 43–75)
NRBC BLD AUTO-RTO: 0 /100 WBCS
PLATELET # BLD AUTO: 371 THOUSANDS/UL (ref 149–390)
PMV BLD AUTO: 9.7 FL (ref 8.9–12.7)
POTASSIUM SERPL-SCNC: 3.7 MMOL/L (ref 3.5–5.3)
RBC # BLD AUTO: 4.38 MILLION/UL (ref 3.88–5.62)
SODIUM SERPL-SCNC: 138 MMOL/L (ref 135–147)
WBC # BLD AUTO: 11.32 THOUSAND/UL (ref 4.31–10.16)

## 2024-06-29 PROCEDURE — 85025 COMPLETE CBC W/AUTO DIFF WBC: CPT | Performed by: PHYSICIAN ASSISTANT

## 2024-06-29 PROCEDURE — 99024 POSTOP FOLLOW-UP VISIT: CPT | Performed by: COLON & RECTAL SURGERY

## 2024-06-29 PROCEDURE — 80048 BASIC METABOLIC PNL TOTAL CA: CPT | Performed by: PHYSICIAN ASSISTANT

## 2024-06-29 RX ORDER — POTASSIUM CHLORIDE 20 MEQ/1
20 TABLET, EXTENDED RELEASE ORAL ONCE
Status: COMPLETED | OUTPATIENT
Start: 2024-06-29 | End: 2024-06-29

## 2024-06-29 RX ORDER — AMOXICILLIN AND CLAVULANATE POTASSIUM 875; 125 MG/1; MG/1
1 TABLET, FILM COATED ORAL EVERY 12 HOURS SCHEDULED
Qty: 4 TABLET | Refills: 0 | Status: SHIPPED | OUTPATIENT
Start: 2024-06-29 | End: 2024-07-01

## 2024-06-29 RX ORDER — GABAPENTIN 100 MG/1
100 CAPSULE ORAL 3 TIMES DAILY
Qty: 21 CAPSULE | Refills: 0 | Status: SHIPPED | OUTPATIENT
Start: 2024-06-29 | End: 2024-07-06

## 2024-06-29 RX ORDER — ENOXAPARIN SODIUM 100 MG/ML
40 INJECTION SUBCUTANEOUS DAILY
Qty: 11.2 ML | Refills: 0 | Status: SHIPPED | OUTPATIENT
Start: 2024-06-26 | End: 2024-07-24

## 2024-06-29 RX ADMIN — GABAPENTIN 100 MG: 100 CAPSULE ORAL at 08:37

## 2024-06-29 RX ADMIN — ENOXAPARIN SODIUM 40 MG: 40 INJECTION SUBCUTANEOUS at 08:35

## 2024-06-29 RX ADMIN — Medication 125 MG: at 09:47

## 2024-06-29 RX ADMIN — METRONIDAZOLE 500 MG: 500 INJECTION, SOLUTION INTRAVENOUS at 01:04

## 2024-06-29 RX ADMIN — DEXTROSE, SODIUM CHLORIDE, AND POTASSIUM CHLORIDE 84 ML/HR: 5; .45; .15 INJECTION INTRAVENOUS at 01:17

## 2024-06-29 RX ADMIN — ACETAMINOPHEN 1000 MG: 10 INJECTION INTRAVENOUS at 00:36

## 2024-06-29 RX ADMIN — METRONIDAZOLE 500 MG: 500 INJECTION, SOLUTION INTRAVENOUS at 08:39

## 2024-06-29 RX ADMIN — ACETAMINOPHEN 1000 MG: 10 INJECTION INTRAVENOUS at 06:54

## 2024-06-29 RX ADMIN — POTASSIUM CHLORIDE 20 MEQ: 1500 TABLET, EXTENDED RELEASE ORAL at 08:39

## 2024-06-29 NOTE — PROGRESS NOTES
"Progress Note - Victor Hugo Hurtado 33 y.o. male MRN: [id number]    [facility] Encounter: [id number]      Assessment:  33M w/ Crohn's disease w/ abscess now s/p lap ileocecectomy on 6/26    AVSS   x1 unmeasured    Labs pending  Plan:  -Advance diet as tolerated - LRD  -Ketamine gtt was Dc'd yest per APS  -MIVF  -OOB and ambulate  -DVT ppx w/ lovenox  -Continue IV rocephin/flagyl x4 days for intra-abdominal abscess  - Dispo planning    Subjective:   Seen and examined. No n/v, abdominal pain well controlled at this time. Ambulating without issue. Endorses bowel function    Objective:     Vitals: Blood pressure 136/83, pulse 64, temperature 98.4 °F (36.9 °C), resp. rate 17, height 5' 8\" (1.727 m), weight 56.7 kg (125 lb), SpO2 98%.,Body mass index is 19.01 kg/m².      Intake/Output Summary (Last 24 hours) at 6/29/2024 0647  Last data filed at 6/28/2024 2320  Gross per 24 hour   Intake 2321.2 ml   Output 950 ml   Net 1371.2 ml       Physical Exam:   General: NAD  HENT: NCAT MMM  Neck: supple, no JVD  CV: nl rate  Lungs: nl wob. No resp distress  ABD: Soft, appropriately tender, nondistended, incision c/d/i  Extrem: No CCE  Neuro: AAOx3       Invasive Devices       Peripheral Intravenous Line  Duration             Peripheral IV 06/29/24 Distal;Left;Upper;Ventral (anterior) Arm <1 day    Peripheral IV 06/29/24 Left;Proximal;Ventral (anterior) Forearm <1 day                      "

## 2024-06-29 NOTE — DISCHARGE INSTR - AVS FIRST PAGE
Please call the office when you leave to schedule an appointment to be seen in 2 weeks    Activity:    Do not lift more than 10 pounds (a gallon of milk) for 1-2 weeks post-operatively                 Walking is encouraged  Normal daily activities including climbing steps are okay  Do not engage in strenuous activity or contact sports for 4-6 weeks post-operatively    Diet:   You may return to your normal heart healthy diet.    Wound Care:  Your wound is closed with dissolvable stitches and glue.  It is okay to shower. Wash incision gently with soap and water and pat dry. Do not soak incisions in bath water or swim for two weeks. Do not apply any creams or ointments.    Pain Medication:   Please take as directed  No driving while taking narcotic pain medications    Other:  If you have questions after discharge please call the office.    If you have increased pain, fever >101.5, increased drainage, redness or a bad smell at your surgery site, please call us immediately or come directly to the Emergency Room

## 2024-06-29 NOTE — DISCHARGE SUMMARY
Discharge Summary - Victor Hugo Hurtado 33 y.o. male MRN: [id number]    [facility] Encounter: [id number]    Admission Date:   Admission Orders (From admission, onward)       Ordered        06/26/24 1929  Inpatient Admission  Once                            Admitting Diagnosis: Crohn's disease of both small and large intestine with abscess (HCC) [K50.814]    HPI: 33-year-old male with Crohn's disease, presenting electively to [facility] on 6/26 for surgical intervention.  Patient was taken to the OR on 6/26 for laparoscopic hand-assisted ileocecectomy, large phlegmon and abscess was noted in the right lower quadrant involving the terminal ileum.  Patient otherwise tolerated procedure well without complications.  Postoperatively he was admitted to the colorectal surgery service, initially n.p.o. with IV fluid resuscitation, standard analgesic regimen, DVT prophylaxis, maintain the Benoit catheter for urine output.  Patient was able to have Benoit removed postoperative day 1 and voiding without issues, was using incentive spirometry, ambulating.  Acute pain service was consulted during stay for further recommendations.  Patient was also initiated on IV antibiotics for phlegmon/abscess noted intraoperatively.  Patient was able to tolerate advancing diet during hospital stay without nausea or emesis, had return of bowel function with flatus and bowel movements.  Patient was feeling well and stable for discharge on 6/29, he will be discharged on Augmentin to finish 4-day postoperative course of antibiotics, also be discharged on Lovenox for total 28 days including days admitted in the hospital.  He will see Dr. Lugo in the outpatient setting in approximately 2 weeks.    Procedures Performed: No orders of the defined types were placed in this encounter.  6/26 laparoscopic hand-assisted ileocecectomy    Summary of Hospital Course: See above HPI      Significant Findings, Care, Treatment and Services  Provided: See above HPI     Complications: None    Discharge Diagnosis: Crohn's disease    Medical Problems       Resolved Problems  Date Reviewed: 6/29/2024   None         Condition at Discharge: good         Discharge instructions/Information to patient and family:   See after visit summary for information provided to patient and family.      Provisions for Follow-Up Care:  See after visit summary for information related to follow-up care and any pertinent home health orders.      PCP: Douglas C Shoenberger, MD    Disposition: Home    Planned Readmission: No      Discharge Statement   I spent 30 minutes discharging the patient. This time was spent on the day of discharge. I had direct contact with the patient on the day of discharge. Additional documentation is required if more than 30 minutes were spent on discharge.     Discharge Medications:  See after visit summary for reconciled discharge medications provided to patient and family.

## 2024-06-29 NOTE — PLAN OF CARE
Problem: PAIN - ADULT  Goal: Verbalizes/displays adequate comfort level or baseline comfort level  Description: Interventions:  - Encourage patient to monitor pain and request assistance  - Assess pain using appropriate pain scale  - Administer analgesics based on type and severity of pain and evaluate response  - Implement non-pharmacological measures as appropriate and evaluate response  - Consider cultural and social influences on pain and pain management  - Notify physician/advanced practitioner if interventions unsuccessful or patient reports new pain  Outcome: Progressing     Problem: INFECTION - ADULT  Goal: Absence or prevention of progression during hospitalization  Description: INTERVENTIONS:  - Assess and monitor for signs and symptoms of infection  - Monitor lab/diagnostic results  - Monitor all insertion sites, i.e. indwelling lines, tubes, and drains  - Monitor endotracheal if appropriate and nasal secretions for changes in amount and color  - Graff appropriate cooling/warming therapies per order  - Administer medications as ordered  - Instruct and encourage patient and family to use good hand hygiene technique  - Identify and instruct in appropriate isolation precautions for identified infection/condition  Outcome: Progressing  Goal: Absence of fever/infection during neutropenic period  Description: INTERVENTIONS:  - Monitor WBC    Outcome: Progressing     Problem: SAFETY ADULT  Goal: Patient will remain free of falls  Description: INTERVENTIONS:  - Educate patient/family on patient safety including physical limitations  - Instruct patient to call for assistance with activity   - Consult OT/PT to assist with strengthening/mobility   - Keep Call bell within reach  - Keep bed low and locked with side rails adjusted as appropriate  - Keep care items and personal belongings within reach  - Initiate and maintain comfort rounds  - Make Fall Risk Sign visible to staff  - Offer Toileting every  Hours,  in advance of need  - Initiate/Maintain alarm  - Obtain necessary fall risk management equipment:   - Apply yellow socks and bracelet for high fall risk patients  - Consider moving patient to room near nurses station  Outcome: Progressing  Goal: Maintain or return to baseline ADL function  Description: INTERVENTIONS:  -  Assess patient's ability to carry out ADLs; assess patient's baseline for ADL function and identify physical deficits which impact ability to perform ADLs (bathing, care of mouth/teeth, toileting, grooming, dressing, etc.)  - Assess/evaluate cause of self-care deficits   - Assess range of motion  - Assess patient's mobility; develop plan if impaired  - Assess patient's need for assistive devices and provide as appropriate  - Encourage maximum independence but intervene and supervise when necessary  - Involve family in performance of ADLs  - Assess for home care needs following discharge   - Consider OT consult to assist with ADL evaluation and planning for discharge  - Provide patient education as appropriate  Outcome: Progressing  Goal: Maintains/Returns to pre admission functional level  Description: INTERVENTIONS:  - Perform AM-PAC 6 Click Basic Mobility/ Daily Activity assessment daily.  - Set and communicate daily mobility goal to care team and patient/family/caregiver.   - Collaborate with rehabilitation services on mobility goals if consulted  - Perform Range of Motion  times a day.  - Reposition patient every  hours.  - Dangle patient  times a day  - Stand patient  times a day  - Ambulate patient  times a day  - Out of bed to chair  times a day   - Out of bed for meals  times a day  - Out of bed for toileting  - Record patient progress and toleration of activity level   Outcome: Progressing     Problem: DISCHARGE PLANNING  Goal: Discharge to home or other facility with appropriate resources  Description: INTERVENTIONS:  - Identify barriers to discharge w/patient and caregiver  - Arrange for  needed discharge resources and transportation as appropriate  - Identify discharge learning needs (meds, wound care, etc.)  - Arrange for interpretive services to assist at discharge as needed  - Refer to Case Management Department for coordinating discharge planning if the patient needs post-hospital services based on physician/advanced practitioner order or complex needs related to functional status, cognitive ability, or social support system  Outcome: Progressing     Problem: Knowledge Deficit  Goal: Patient/family/caregiver demonstrates understanding of disease process, treatment plan, medications, and discharge instructions  Description: Complete learning assessment and assess knowledge base.  Interventions:  - Provide teaching at level of understanding  - Provide teaching via preferred learning methods  Outcome: Progressing

## 2024-06-30 LAB
ATRIAL RATE: 65 BPM
P AXIS: 51 DEGREES
PR INTERVAL: 142 MS
QRS AXIS: 66 DEGREES
QRSD INTERVAL: 90 MS
QT INTERVAL: 428 MS
QTC INTERVAL: 445 MS
T WAVE AXIS: 72 DEGREES
VENTRICULAR RATE: 65 BPM

## 2024-06-30 PROCEDURE — 93010 ELECTROCARDIOGRAM REPORT: CPT | Performed by: INTERNAL MEDICINE

## 2024-07-01 PROCEDURE — 88342 IMHCHEM/IMCYTCHM 1ST ANTB: CPT | Performed by: PATHOLOGY

## 2024-07-01 PROCEDURE — 88307 TISSUE EXAM BY PATHOLOGIST: CPT | Performed by: PATHOLOGY

## 2024-07-09 NOTE — PROGRESS NOTES
Ambulatory Visit  Name: Victor Hugo Hurtado      : 1990      MRN: [id number]  Encounter Provider: Jana Lugo MD  Encounter Date: 7/10/2024   Encounter department: [facility] [place]    Assessment & Plan   {There are no diagnoses linked to this encounter. (Refresh or delete this SmartLink)}    History of Present Illness   {Disappearing Hyperlinks I Encounters * My Last Note * Since Last Visit * History :35748}  Victor Hugo Hurtado is a 33 y.o. male who presents for a post operative evaluation.     The patient is status post hand assisted laparoscopic ileocolectomy on 2024.    Operative Findings: Large phlegmon and abscess cavity in right lower quadrant involving terminal ileum. Pericolonic inflammation at sigmoid colon from adjacent abscess. Primary side-to-side functional end-to-end anastomosis matured with 100 SHANTI stapler. Flexible sigmoidoscopy performed revealing healthy sigmoid colon and no bubbles on leak test.    Surgical pathology:  A. 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  -   Negative for granuloma, dysplasia and malignancy.  -   Immunohistochemical stain on A6 for CMV is pending and will be reported in addendum.  -   Cecum with reactive lymphoid follicles; negative for colitis.  -   Appendix with acute appendicitis and acute serositis.  -   Proximal resection margin, negative for ileitis.  -   Distal resection margin with patchy acute colitis.  -   Fifteen benign lymph nodes.  ~Comment: The findings are consistent with the patient's known Crohn's disease.        Review of Systems  {Select to Display [facility] (Optional):71125}  Objective   {Disappearing Hyperlinks   Review Vitals * Enter New Vitals * Results Review * Labs * Imaging * Cardiology * Procedures * Lung Cancer  Screening :22693}  There were no vitals taken for this visit.    Physical Exam  Administrative Statements {Disappearing Hyperlinks I  Level of Service * PCMH/PCSP:65099}  {Time Spent Statement (Optional):08571}

## 2024-07-09 NOTE — PROGRESS NOTES
Ambulatory Visit  Name: Victor Hugo Hurtado      : 1990      MRN: [id number]  Encounter Provider: Jana Lugo MD  Encounter Date: 7/10/2024   Encounter department: [facility] [place]    Assessment & Plan   1. Crohn's disease of both small and large intestine with abscess (HCC)  Patient underwent laparoscopic ileocecectomy on 2024  He had an uneventful postoperative course  He does have postoperative diarrhea, for which I recommended fiber supplementation and a high-fiber diet  Wound appeared to be healing appropriately  He may resume biologic therapy under the direction of gastroenterology   He will follow-up in this office in 3 months to assess symptoms      History of Present Illness       Victor Hugo Hurtado is a 33 y.o. male who presents for a post operative evaluation.      The patient is status post hand assisted laparoscopic ileocolectomy on 2024.     Operative Findings: Large phlegmon and abscess cavity in right lower quadrant involving terminal ileum. Pericolonic inflammation at sigmoid colon from adjacent abscess. Primary side-to-side functional end-to-end anastomosis matured with 100 SHANTI stapler. Flexible sigmoidoscopy performed revealing healthy sigmoid colon and no bubbles on leak test.     Surgical pathology:  A. 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  -   Negative for granuloma, dysplasia and malignancy.  -   Immunohistochemical stain on A6 for CMV is pending and will be reported in addendum.  -   Cecum with reactive lymphoid follicles; negative for colitis.  -   Appendix with acute appendicitis and acute serositis.  -   Proximal resection margin, negative for ileitis.  -   Distal resection margin with patchy acute colitis.  -   Fifteen benign lymph  nodes.  ~Comment: The findings are consistent with the patient's known Crohn's disease.    The patient reports that he is having frequent bowel movements. Yesterday he had 10 bowel movements. He has pain with bowel movements. He also feels as if he is not fully evacuating his bowels.     He says that he has some bright red blood after he wipes after a bowel movement.     His appetite and diet are back to normal.     Review of Systems   Constitutional:  Negative for chills and fever.   HENT:  Negative for ear pain and sore throat.    Eyes:  Negative for pain and visual disturbance.   Respiratory:  Negative for cough and shortness of breath.    Cardiovascular:  Negative for chest pain and palpitations.   Gastrointestinal:  Positive for diarrhea. Negative for abdominal pain and vomiting.   Genitourinary:  Negative for dysuria and hematuria.   Musculoskeletal:  Negative for arthralgias and back pain.   Skin:  Negative for color change and rash.   Neurological:  Negative for seizures and syncope.   All other systems reviewed and are negative.    Past Medical History   Past Medical History:   Diagnosis Date    Crohn's colitis (HCC)      Past Surgical History:   Procedure Laterality Date    COLONOSCOPY      MN LAPAROSCOPY COLECTOMY PARTIAL W/ANASTOMOSIS N/A 6/26/2024    Procedure: HAND-ASSISTED LAPAROSCOPIC ILEOCECETOMY;  Surgeon: Jana Lugo MD;  Location: [facility];  Service: Colorectal    MN SIGMOIDOSCOPY FLX DX W/COLLJ SPEC BR/WA IF PFRMD N/A 6/26/2024    Procedure: SIGMOIDOSCOPY FLEXIBLE;  Surgeon: Jana Lugo MD;  Location: [facility];  Service: Colorectal     Family History   Problem Relation Age of Onset    No Known Problems Mother     No Known Problems Father     Colon cancer Neg Hx     Colon polyps Neg Hx      Current Outpatient Medications on File Prior to Visit   Medication Sig Dispense Refill    Cholecalciferol (Vitamin D3) 50 MCG (2000 UT) capsule Take 2,000 Units by mouth daily       enoxaparin (LOVENOX) 40 mg/0.4 mL Inject 0.4 mL (40 mg total) under the skin daily for 28 days 11.2 mL 0    gabapentin (NEURONTIN) 100 mg capsule Take 1 capsule (100 mg total) by mouth 3 (three) times a day for 7 days 21 capsule 0     No current facility-administered medications on file prior to visit.     Allergies   Allergen Reactions    Ammonia Nasal Congestion      Objective     There were no vitals taken for this visit.    Physical Exam  Vitals and nursing note reviewed.   Constitutional:       General: He is not in acute distress.     Appearance: He is well-developed.   HENT:      Head: Normocephalic and atraumatic.   Eyes:      Conjunctiva/sclera: Conjunctivae normal.   Cardiovascular:      Rate and Rhythm: Normal rate and regular rhythm.      Heart sounds: No murmur heard.  Pulmonary:      Effort: Pulmonary effort is normal. No respiratory distress.      Breath sounds: Normal breath sounds.   Abdominal:      Palpations: Abdomen is soft.      Tenderness: There is no abdominal tenderness.      Comments: Incisions well-healed   Musculoskeletal:         General: No swelling.      Cervical back: Neck supple.   Skin:     General: Skin is warm and dry.      Capillary Refill: Capillary refill takes less than 2 seconds.   Neurological:      Mental Status: He is alert.   Psychiatric:         Mood and Affect: Mood normal.       Administrative Statements   I have spent a total time of 10 minutes in caring for this patient on the day of the visit/encounter including Diagnostic results, Prognosis, Risks and benefits of tx options, Instructions for management, Patient and family education, Importance of tx compliance, Risk factor reductions, Impressions, Counseling / Coordination of care, Documenting in the medical record, Reviewing / ordering tests, medicine, procedures  , Obtaining or reviewing history  , and Communicating with other healthcare professionals .

## 2024-07-10 ENCOUNTER — OFFICE VISIT (OUTPATIENT)
Age: 34
End: 2024-07-10

## 2024-07-10 VITALS
BODY MASS INDEX: 19.55 KG/M2 | SYSTOLIC BLOOD PRESSURE: 98 MMHG | WEIGHT: 129 LBS | HEIGHT: 68 IN | DIASTOLIC BLOOD PRESSURE: 60 MMHG

## 2024-07-10 DIAGNOSIS — K50.814 CROHN'S DISEASE OF BOTH SMALL AND LARGE INTESTINE WITH ABSCESS (HCC): Primary | ICD-10-CM

## 2024-07-10 PROCEDURE — 99024 POSTOP FOLLOW-UP VISIT: CPT | Performed by: SURGERY

## 2024-07-10 NOTE — PATIENT INSTRUCTIONS
High fiber diet/increased hydration, 20-30 grams fiber per day, increased fruits/vegetables    Start fiber supplementation with benefiber. You may put this in your coffee/tea/juice in the morning. Start with 2 tsp once per day. If you experience bloating or gassiness, you may start with 1 tsp once per day. You may increase fiber supplementation to UP TO 2 tsp three times per day in order to achieve 1 formed bowel movement once per day.    Aim to drink at least 64 oz of water per day      High Fiber Diet   AMBULATORY CARE:   A high-fiber diet  includes foods that have a high amount of fiber. Fiber is the part of fruits, vegetables, and grains that is not broken down by your body. Fiber keeps your bowel movements regular. Fiber can also help lower your cholesterol level, control blood sugar in people with diabetes, and relieve constipation. Fiber can also help you control your weight because it helps you feel full faster. Most adults should eat 25 to 35 grams of fiber each day. Talk to your dietitian or healthcare provider about the amount of fiber you need.  Good sources of fiber:       Foods with at least 4 grams of fiber per serving:      ? to ½ cup of high-fiber cereal (check the nutrition label on the box)    ½ cup of blackberries or raspberries    4 dried prunes    1 cooked artichoke    ½ cup of cooked legumes, such as lentils, or red, kidney, and jacob beans    Foods with 1 to 3 grams of fiber per servin slice of whole-wheat, pumpernickel, or rye bread    ½ cup of cooked brown rice    4 whole-wheat crackers    1 cup of oatmeal    ½ cup of cereal with 1 to 3 grams of fiber per serving (check the nutrition label on the box)    1 small piece of fruit, such as an apple, banana, pear, kiwi, or orange    3 dates    ½ cup of canned apricots, fruit cocktail, peaches, or pears    ½ cup of raw or cooked vegetables, such as carrots, cauliflower, cabbage, spinach, squash, or corn  Ways that you can increase fiber  in your diet:   Choose brown or wild rice instead of white rice.     Use whole wheat flour in recipes instead of white or all-purpose flour.     Add beans and peas to casseroles or soups.     Choose fresh fruit and vegetables with peels or skins on instead of juices.    Other diet guidelines to follow:   Add fiber to your diet slowly.  You may have abdominal discomfort, bloating, and gas if you add fiber to your diet too quickly.     Drink plenty of liquids as you add fiber to your diet.  You may have nausea or develop constipation if you do not drink enough water. Ask how much liquid to drink each day and which liquids are best for you.    © Copyright [employer] 2023 Information is for End User's use only and may not be sold, redistributed or otherwise used for commercial purposes.  The above information is an  only. It is not intended as medical advice for individual conditions or treatments. Talk to your doctor, nurse or pharmacist before following any medical regimen to see if it is safe and effective for you.

## 2024-07-17 ENCOUNTER — OFFICE VISIT (OUTPATIENT)
Dept: GASTROENTEROLOGY | Facility: CLINIC | Age: 34
End: 2024-07-17
Payer: COMMERCIAL

## 2024-07-17 ENCOUNTER — TELEPHONE (OUTPATIENT)
Dept: GASTROENTEROLOGY | Facility: CLINIC | Age: 34
End: 2024-07-17

## 2024-07-17 VITALS
SYSTOLIC BLOOD PRESSURE: 98 MMHG | DIASTOLIC BLOOD PRESSURE: 61 MMHG | WEIGHT: 126.8 LBS | HEIGHT: 68 IN | BODY MASS INDEX: 19.22 KG/M2 | OXYGEN SATURATION: 98 % | TEMPERATURE: 97.1 F | HEART RATE: 69 BPM

## 2024-07-17 DIAGNOSIS — E46 PROTEIN-CALORIE MALNUTRITION, UNSPECIFIED SEVERITY (HCC): ICD-10-CM

## 2024-07-17 DIAGNOSIS — K50.814 CROHN'S DISEASE OF BOTH SMALL AND LARGE INTESTINE WITH ABSCESS (HCC): Primary | ICD-10-CM

## 2024-07-17 DIAGNOSIS — I82.612 ACUTE EMBOLISM AND THROMBOSIS OF SUPERFICIAL VEIN OF LEFT UPPER EXTREMITY: ICD-10-CM

## 2024-07-17 PROCEDURE — 99214 OFFICE O/P EST MOD 30 MIN: CPT | Performed by: DIETITIAN, REGISTERED

## 2024-07-17 NOTE — TELEPHONE ENCOUNTER
Hi team,    Can we please start this patient on infliximab?  He is interested in home infusions if possible.  Thank you!    Flex

## 2024-07-17 NOTE — PROGRESS NOTES
[facility] - Outpatient Follow-up Note  Victor Hugo Hurtado 33 y.o. male MRN: [id number]  Encounter: [id number]          ASSESSMENT AND PLAN:    1.  Crohn's disease of small intestine with abscess   2.  Acute embolism/thrombosis of superficial vein of left upper extremity  3.  Protein calorie malnutrition  33 y.o. male with history of ileal Crohn's disease complicated by abscess and stricture diagnosed November 2023, treated with antibiotics and currently on Entyvio who presents for follow up.  Unfortunately his disease progressed with a new abscess despite Entyvio.  Now s/p ileocecectomy with primary side-to-side anastomosis 6/26/2024.  Operative findings showed large phlegmon and abscess cavity in RLQ involving terminal ileum, pericolonic inflammation at sigmoid colon from adjacent abscess.  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  Most recent CRP 10.4 on 6/18/2024.  Vedolizumab level 2.0 with no antibodies 6/18/2024.  Quant gold and hep B screening negative 1/2024.  Patient is feeling significantly better since surgery.  Patient notes a hard/swollen and tender vein in his left wrist ever since having the IV placed for surgery 3 weeks ago.  He denies any swelling, pain, numbness of his hand.    -Recommend venous duplex of LUE to evaluate for superficial venous thrombosis and rule out DVT.    -Recommend switching from Entyvio to infliximab.  Discussed risks/benefits with patient at length and he agrees with this plan.  We will try to get patient approved for at home infusions.  -Recommend checking CBC with differential, CMP, CRP, sed rate, iron panel, vitamin D, vitamin B12, and fecal calprotectin.  -After patient is initiated on infliximab, would recommend reevaluating laboratory markers of inflammation and checking  infliximab drug/antibody levels.  -Next MR enterography to be determined at next office visit  -Next colonoscopy to be determined at next office visit  -Advised patient to call the office or send a [employer] message with any questions/concerns or any new/worsening symptoms.      Follow up in office as scheduled with Dr. Freedman (12/2024).    __________________________________________________________    SUBJECTIVE:  Victor Hugo Hurtado is a 33 y.o. male with history of ileal Crohn's disease complicated by abscess and stricture diagnosed November 2023, treated with antibiotics and currently on Entyvio who presents for follow up.  Last office visit 6/5/2024.    Unfortunately his disease progressed with a new abscess despite Entyvio.  Now s/p ileocecectomy with primary side-to-side anastomosis 6/26/2024.  Operative findings showed large phlegmon and abscess cavity in RLQ involving terminal ileum, pericolonic inflammation at sigmoid colon from adjacent abscess.  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  CT abdomen pelvis 5/2024 showed development of 3.9 x 3.9 x 3.8 RLQ interloop abscess and persistent inflammatory changes involving the distal ileum and interval decrease in the size of the inflammatory mass.  Colonoscopy from 1/2024 noted a stricture that was not traversable at the ileocecal valve with biopsies showing inflammation and preserved crypt architecture.  MRA from 12/2023 showed active inflammatory small bowel Crohn's disease with luminal narrowing involving distal 15 cm of ileum and stricture as well as inflammatory mass.    Patient reports he is feeling significantly better since his surgery.  He was previously having up to 10 loose/watery BMs per day, but has now been having 2-3 BMs per day over the last week which are starting to become more formed.   He is experiencing rectal bleeding but denies any blood in the stool or black or tarry stool.  He also has ongoing fecal urgency and pain in the rectum when having a bowel movement, but these seem to be improving.  He has frequent nocturnal bowel movements but denies any incontinence.  He denies any abdominal pain, rashes, mouth sores, joint pains, heartburn, nausea, vomiting.  He previously weighed 118 pounds prior to his surgery, and is currently 126 pounds.  His appetite is fair, slowly improving.    REVIEW OF SYSTEMS:  10 point ROS reviewed and negative, except as above      Historical Information   Past Medical History:   Diagnosis Date   • Crohn's colitis (HCC)      Past Surgical History:   Procedure Laterality Date   • COLONOSCOPY     • MO LAPAROSCOPY COLECTOMY PARTIAL W/ANASTOMOSIS N/A 6/26/2024    Procedure: HAND-ASSISTED LAPAROSCOPIC ILEOCECETOMY;  Surgeon: Jana Lugo MD;  [facility];  Service: Colorectal   • MO SIGMOIDOSCOPY FLX DX W/COLLJ SPEC BR/WA IF PFRMD N/A 6/26/2024    Procedure: SIGMOIDOSCOPY FLEXIBLE;  Surgeon: Jana Lugo MD;  Location[facility];  Service: Colorectal     Social History   Social History     Substance and Sexual Activity   Alcohol Use Not Currently     Social History     Substance and Sexual Activity   Drug Use Yes   • Types: Marijuana    Comment: states 2 ounces/month, medical card     Social History     Tobacco Use   Smoking Status Every Day   • Current packs/day: 0.50   • Types: Cigarettes   Smokeless Tobacco Never     Family History   Problem Relation Age of Onset   • No Known Problems Mother    • No Known Problems Father    • Colon cancer Neg Hx    • Colon polyps Neg Hx        Meds/Allergies       Current Outpatient Medications:   •  Cholecalciferol (Vitamin D3) 50 MCG (2000 UT) capsule  •  enoxaparin (LOVENOX) 40 mg/0.4 mL  •  gabapentin (NEURONTIN) 100 mg capsule    Allergies   Allergen Reactions   • Ammonia Nasal Congestion            Objective     Wt Readings from Last 3 Encounters:   07/17/24 57.5 kg (126 lb 12.8 oz)   07/10/24 58.5 kg (129 lb)   06/26/24 56.7 kg (125 lb)     Temp Readings from Last 3 Encounters:   07/17/24 (!) 97.1 °F (36.2 °C) (Tympanic)   06/29/24 98 °F (36.7 °C)   06/05/24 98.3 °F (36.8 °C) (Tympanic)     BP Readings from Last 3 Encounters:   07/17/24 98/61   07/10/24 98/60   06/29/24 126/80     Pulse Readings from Last 3 Encounters:   07/17/24 69   06/29/24 60   06/05/24 98          PHYSICAL EXAM:      Physical Exam  Vitals reviewed.   Constitutional:       General: He is not in acute distress.     Appearance: He is well-developed.   HENT:      Head: Normocephalic and atraumatic.   Eyes:      Conjunctiva/sclera: Conjunctivae normal.   Cardiovascular:      Rate and Rhythm: Normal rate and regular rhythm.      Heart sounds: No murmur heard.  Pulmonary:      Effort: Pulmonary effort is normal. No respiratory distress.      Breath sounds: Normal breath sounds.   Abdominal:      General: A surgical scar is present. Bowel sounds are normal. There is no distension.      Palpations: Abdomen is soft.      Tenderness: There is no abdominal tenderness.   Musculoskeletal:         General: No swelling.      Cervical back: Neck supple.      Comments: Left wrist with hard/swollen and tender superficial vein   Skin:     General: Skin is warm and dry.   Neurological:      Mental Status: He is alert.   Psychiatric:         Mood and Affect: Mood normal.          Lab Results:   No visits with results within 1 Day(s) from this visit.   Latest known visit with results is:   Admission on 06/26/2024, Discharged on 06/29/2024   Component Date Value   • ABO Grouping 06/26/2024 A    • Rh Factor 06/26/2024 Positive    • Case Report 06/26/2024                      Value:Surgical Pathology Report                         Case: [id number]                                  Authorizing Provider:  Jana Lugo, Collected:            06/26/2024 1755                                     MD                                                                           Ordering Location:     [facility]      Received:            06/26/2024 2148                                     Hospital Operating Room                                                      Pathologist:           Libby Menchaca MD                                                                    Specimen:    Colon, Ileocecectomy                                                                      • Addendum 06/26/2024                      Value:Immunostain for CMV on A6 is negative.     • Final Diagnosis 06/26/2024                      Value:A. 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  -   Negative for granuloma, dysplasia and malignancy.  -   Immunohistochemical stain on A6 for CMV is pending and will be reported in addendum.  -   Cecum with reactive lymphoid follicles; negative for colitis.  -   Appendix with acute appendicitis and acute serositis.  -   Proximal resection margin, negative for ileitis.  -   Distal resection margin with patchy acute colitis.  -   Fifteen benign lymph nodes.     Comment: The findings are consistent with the patient's known Crohn's disease.      Interpretation performed at [facility], [address] [place] [address]       • Additional Information 06/26/2024                      Value:All reported additional testing was performed with appropriately reactive controls.  These tests were developed and their performance characteristics determined by [facility] or appropriate performing facility, though some tests may be performed on tissues which have not been validated for performance characteristics (such as staining performed on alcohol exposed cell blocks and  "decalcified tissues).  Results should be interpreted with caution and in the context of the patients’ clinical condition. These tests may not be cleared or approved by the U.S. Food and Drug Administration, though the FDA has determined that such clearance or approval is not necessary. These tests are used for clinical purposes and they should not be regarded as investigational or for research. This laboratory has been approved by [place] 88, designated as a high-complexity laboratory and is qualified to perform these tests.  .     • Gross Description 06/26/2024                      Value:A. The specimen is received in formalin, labeled with the patient's name and hospital number, and is designated \" ileocecectomy\".  It consists of an ileocecectomy specimen with opposing margins stapled together.  The ileum is 27.3 cm in length and 2.3-3.0 cm in diameter with attached 5.2 x 4.0 cm cecal pouch and 6.5 x 0.7 cm appendix.  Adjacent to the staple line is a 1.6 cm anastomosis.  The serosa of the small bowel is tan to red-brown and smooth.  Sectioning reveals the mucosa is predominantly tan to red and smooth to ulcerated with a wall thickness of 0.5 to 1.4 cm.  There is a 3.7 x 2.2 x 2.0 cm outpouching of ileal wall consistent with diverticulum is located 5.3 cm from the ileal margin.  The cecal mucosa is tan with a normal folding pattern and a wall thickness of 0.3 cm.  The appendix serosa is tan-pink and smooth with a wall thickness of 0.2 to 0.4 cm.  Multiple potential lymph nodes ranging from 0.3 to 0.9 cm in greatest dimension are identified within the adherent                           mesenteric fat.  Representative sections are submitted as follows:   A1: Ileal margin  A2: Cecal margin  A3: Sections perpendicular to anastomosis  A4-A5: Section of diverticula, bisected  A6-A10: Ileum  A11: Appendix  A12: Cecum  A13: 2 potential lymph nodes, bisected (1 differentially inked black)  A14: 2 potential lymph nodes, " bisected (1 differentially inked black)  A15: 2 potential lymph nodes, bisected (1 differentially inked black  A16: 4 potential lymph nodes, entirely submitted  A17: 5 potential lymph nodes, entirely submitted  Note: The estimated total formalin fixation time based upon information provided by the submitting clinician and the standard processing schedule is under 72 hours. [email or web address]      • Clinical Information 06/26/2024                      Value:Per op note,   Operative Indications:  Crohn's disease of both small and large intestine with abscess (HCC) [K50.814]     Operative Findings:  Large phlegmon and abscess cavity in right lower quadrant involving terminal ileum  Pericolonic inflammation at sigmoid colon from adjacent abscess  Primary side-to-side functional end-to-end anastomosis matured with 100 SHANTI stapler  Flexible sigmoidoscopy performed revealing healthy sigmoid colon and no bubbles on leak test   • WBC 06/27/2024 17.81 (H)    • RBC 06/27/2024 4.28    • Hemoglobin 06/27/2024 11.6 (L)    • Hematocrit 06/27/2024 35.9 (L)    • MCV 06/27/2024 84    • MCH 06/27/2024 27.1    • MCHC 06/27/2024 32.3    • RDW 06/27/2024 15.3 (H)    • MPV 06/27/2024 9.6    • Platelets 06/27/2024 308    • nRBC 06/27/2024 0    • Segmented % 06/27/2024 85 (H)    • Immature Grans % 06/27/2024 1    • Lymphocytes % 06/27/2024 7 (L)    • Monocytes % 06/27/2024 7    • Eosinophils Relative 06/27/2024 0    • Basophils Relative 06/27/2024 0    • Absolute Neutrophils 06/27/2024 15.25 (H)    • Absolute Immature Grans 06/27/2024 0.11    • Absolute Lymphocytes 06/27/2024 1.27    • Absolute Monocytes 06/27/2024 1.15    • Eosinophils Absolute 06/27/2024 0.00    • Basophils Absolute 06/27/2024 0.03    • Sodium 06/27/2024 136    • Potassium 06/27/2024 4.2    • Chloride 06/27/2024 103    • CO2 06/27/2024 25    • ANION GAP 06/27/2024 8    • BUN 06/27/2024 6    • Creatinine 06/27/2024 0.64    • Glucose 06/27/2024 111    • Calcium 06/27/2024 8.9    •  eGFR 06/27/2024 127    • Magnesium 06/27/2024 1.8 (L)    • Phosphorus 06/27/2024 4.1    • WBC 06/28/2024 10.88 (H)    • RBC 06/28/2024 4.48    • Hemoglobin 06/28/2024 12.0    • Hematocrit 06/28/2024 37.8    • MCV 06/28/2024 84    • MCH 06/28/2024 26.8    • MCHC 06/28/2024 31.7    • RDW 06/28/2024 15.9 (H)    • MPV 06/28/2024 9.6    • Platelets 06/28/2024 340    • nRBC 06/28/2024 0    • Segmented % 06/28/2024 77 (H)    • Immature Grans % 06/28/2024 0    • Lymphocytes % 06/28/2024 11 (L)    • Monocytes % 06/28/2024 10    • Eosinophils Relative 06/28/2024 2    • Basophils Relative 06/28/2024 0    • Absolute Neutrophils 06/28/2024 8.42 (H)    • Absolute Immature Grans 06/28/2024 0.03    • Absolute Lymphocytes 06/28/2024 1.19    • Absolute Monocytes 06/28/2024 1.03    • Eosinophils Absolute 06/28/2024 0.19    • Basophils Absolute 06/28/2024 0.02    • Sodium 06/28/2024 139    • Potassium 06/28/2024 4.1    • Chloride 06/28/2024 100    • CO2 06/28/2024 30    • ANION GAP 06/28/2024 9    • BUN 06/28/2024 5    • Creatinine 06/28/2024 0.67    • Glucose 06/28/2024 137    • Calcium 06/28/2024 9.2    • eGFR 06/28/2024 125    • Sodium 06/29/2024 138    • Potassium 06/29/2024 3.7    • Chloride 06/29/2024 101    • CO2 06/29/2024 27    • ANION GAP 06/29/2024 10    • BUN 06/29/2024 8    • Creatinine 06/29/2024 0.69    • Glucose 06/29/2024 110    • Calcium 06/29/2024 9.4    • eGFR 06/29/2024 123    • WBC 06/29/2024 11.32 (H)    • RBC 06/29/2024 4.38    • Hemoglobin 06/29/2024 11.9 (L)    • Hematocrit 06/29/2024 37.1    • MCV 06/29/2024 85    • MCH 06/29/2024 27.2    • MCHC 06/29/2024 32.1    • RDW 06/29/2024 16.0 (H)    • MPV 06/29/2024 9.7    • Platelets 06/29/2024 371    • nRBC 06/29/2024 0    • Segmented % 06/29/2024 63    • Immature Grans % 06/29/2024 0    • Lymphocytes % 06/29/2024 20    • Monocytes % 06/29/2024 11    • Eosinophils Relative 06/29/2024 6    • Basophils Relative 06/29/2024 0    • Absolute Neutrophils 06/29/2024 7.03     • Absolute Immature Grans 06/29/2024 0.04    • Absolute Lymphocytes 06/29/2024 2.31    • Absolute Monocytes 06/29/2024 1.25 (H)    • Eosinophils Absolute 06/29/2024 0.64 (H)    • Basophils Absolute 06/29/2024 0.05    • Ventricular Rate 06/28/2024 65    • Atrial Rate 06/28/2024 65    • TX Interval 06/28/2024 142    • QRSD Interval 06/28/2024 90    • QT Interval 06/28/2024 428    • QTC Interval 06/28/2024 445    • P Axis 06/28/2024 51    • QRS Axis 06/28/2024 66    • T Wave Axis 06/28/2024 72        Lab Results   Component Value Date    WBC 11.32 (H) 06/29/2024    HGB 11.9 (L) 06/29/2024    HCT 37.1 06/29/2024    MCV 85 06/29/2024     06/29/2024       Lab Results   Component Value Date    SODIUM 138 06/29/2024    K 3.7 06/29/2024     06/29/2024    CO2 27 06/29/2024    AGAP 10 06/29/2024    BUN 8 06/29/2024    CREATININE 0.69 06/29/2024    GLUC 110 06/29/2024    CALCIUM 9.4 06/29/2024    AST 17 06/18/2024    ALT 18 06/18/2024    ALKPHOS 67 06/18/2024    TP 7.5 06/18/2024    TBILI 0.88 06/18/2024    EGFR 123 06/29/2024         Radiology Results:   No results found.

## 2024-07-26 ENCOUNTER — APPOINTMENT (OUTPATIENT)
Dept: LAB | Facility: HOSPITAL | Age: 34
End: 2024-07-26
Payer: COMMERCIAL

## 2024-07-26 DIAGNOSIS — K50.814 CROHN'S DISEASE OF BOTH SMALL AND LARGE INTESTINE WITH ABSCESS (HCC): ICD-10-CM

## 2024-07-26 LAB
25(OH)D3 SERPL-MCNC: 27.9 NG/ML (ref 30–100)
ALBUMIN SERPL BCG-MCNC: 4.5 G/DL (ref 3.5–5)
ALP SERPL-CCNC: 89 U/L (ref 34–104)
ALT SERPL W P-5'-P-CCNC: 63 U/L (ref 7–52)
ANION GAP SERPL CALCULATED.3IONS-SCNC: 5 MMOL/L (ref 4–13)
AST SERPL W P-5'-P-CCNC: 48 U/L (ref 13–39)
BASOPHILS # BLD AUTO: 0.08 THOUSANDS/ÂΜL (ref 0–0.1)
BASOPHILS NFR BLD AUTO: 1 % (ref 0–1)
BILIRUB SERPL-MCNC: 1.02 MG/DL (ref 0.2–1)
BUN SERPL-MCNC: 11 MG/DL (ref 5–25)
CALCIUM SERPL-MCNC: 9.7 MG/DL (ref 8.4–10.2)
CHLORIDE SERPL-SCNC: 105 MMOL/L (ref 96–108)
CO2 SERPL-SCNC: 30 MMOL/L (ref 21–32)
CREAT SERPL-MCNC: 0.85 MG/DL (ref 0.6–1.3)
CRP SERPL QL: <1 MG/L
EOSINOPHIL # BLD AUTO: 0.56 THOUSAND/ÂΜL (ref 0–0.61)
EOSINOPHIL NFR BLD AUTO: 9 % (ref 0–6)
ERYTHROCYTE [DISTWIDTH] IN BLOOD BY AUTOMATED COUNT: 17 % (ref 11.6–15.1)
ERYTHROCYTE [SEDIMENTATION RATE] IN BLOOD: 10 MM/HOUR (ref 0–14)
FERRITIN SERPL-MCNC: 83 NG/ML (ref 24–336)
GFR SERPL CREATININE-BSD FRML MDRD: 113 ML/MIN/1.73SQ M
GLUCOSE P FAST SERPL-MCNC: 103 MG/DL (ref 65–99)
HCT VFR BLD AUTO: 42.9 % (ref 36.5–49.3)
HGB BLD-MCNC: 13.4 G/DL (ref 12–17)
IMM GRANULOCYTES # BLD AUTO: 0.02 THOUSAND/UL (ref 0–0.2)
IMM GRANULOCYTES NFR BLD AUTO: 0 % (ref 0–2)
IRON SATN MFR SERPL: 7 % (ref 15–50)
IRON SERPL-MCNC: 27 UG/DL (ref 50–212)
LYMPHOCYTES # BLD AUTO: 1.98 THOUSANDS/ÂΜL (ref 0.6–4.47)
LYMPHOCYTES NFR BLD AUTO: 31 % (ref 14–44)
MCH RBC QN AUTO: 27.5 PG (ref 26.8–34.3)
MCHC RBC AUTO-ENTMCNC: 31.2 G/DL (ref 31.4–37.4)
MCV RBC AUTO: 88 FL (ref 82–98)
MONOCYTES # BLD AUTO: 0.42 THOUSAND/ÂΜL (ref 0.17–1.22)
MONOCYTES NFR BLD AUTO: 7 % (ref 4–12)
NEUTROPHILS # BLD AUTO: 3.33 THOUSANDS/ÂΜL (ref 1.85–7.62)
NEUTS SEG NFR BLD AUTO: 52 % (ref 43–75)
NRBC BLD AUTO-RTO: 0 /100 WBCS
PLATELET # BLD AUTO: 304 THOUSANDS/UL (ref 149–390)
PMV BLD AUTO: 9.5 FL (ref 8.9–12.7)
POTASSIUM SERPL-SCNC: 4.4 MMOL/L (ref 3.5–5.3)
PROT SERPL-MCNC: 7.9 G/DL (ref 6.4–8.4)
RBC # BLD AUTO: 4.87 MILLION/UL (ref 3.88–5.62)
SODIUM SERPL-SCNC: 140 MMOL/L (ref 135–147)
TIBC SERPL-MCNC: 406 UG/DL (ref 250–450)
UIBC SERPL-MCNC: 379 UG/DL (ref 155–355)
VIT B12 SERPL-MCNC: 260 PG/ML (ref 180–914)
WBC # BLD AUTO: 6.39 THOUSAND/UL (ref 4.31–10.16)

## 2024-07-26 PROCEDURE — 83540 ASSAY OF IRON: CPT

## 2024-07-26 PROCEDURE — 85025 COMPLETE CBC W/AUTO DIFF WBC: CPT

## 2024-07-26 PROCEDURE — 80053 COMPREHEN METABOLIC PANEL: CPT

## 2024-07-26 PROCEDURE — 82728 ASSAY OF FERRITIN: CPT

## 2024-07-26 PROCEDURE — 36415 COLL VENOUS BLD VENIPUNCTURE: CPT

## 2024-07-26 PROCEDURE — 86140 C-REACTIVE PROTEIN: CPT

## 2024-07-26 PROCEDURE — 82306 VITAMIN D 25 HYDROXY: CPT

## 2024-07-26 PROCEDURE — 82607 VITAMIN B-12: CPT

## 2024-07-26 PROCEDURE — 83550 IRON BINDING TEST: CPT

## 2024-07-26 PROCEDURE — 85652 RBC SED RATE AUTOMATED: CPT

## 2024-07-29 DIAGNOSIS — E55.9 VITAMIN D DEFICIENCY: Primary | ICD-10-CM

## 2024-07-29 DIAGNOSIS — E53.8 B12 DEFICIENCY: ICD-10-CM

## 2024-07-29 RX ORDER — CHOLECALCIFEROL (VITAMIN D3) 125 MCG
1 CAPSULE ORAL DAILY
Qty: 90 CAPSULE | Refills: 3 | Status: SHIPPED | OUTPATIENT
Start: 2024-07-29

## 2024-07-29 RX ORDER — LANOLIN ALCOHOL/MO/W.PET/CERES
1000 CREAM (GRAM) TOPICAL DAILY
Qty: 90 TABLET | Refills: 3 | Status: SHIPPED | OUTPATIENT
Start: 2024-07-29

## 2024-09-27 ENCOUNTER — APPOINTMENT (OUTPATIENT)
Dept: LAB | Facility: HOSPITAL | Age: 34
End: 2024-09-27
Payer: COMMERCIAL

## 2024-09-27 DIAGNOSIS — K50.814 CROHN'S DISEASE OF BOTH SMALL AND LARGE INTESTINE WITH ABSCESS (HCC): ICD-10-CM

## 2024-09-27 DIAGNOSIS — D84.9 IMMUNODEFICIENCY, UNSPECIFIED (HCC): ICD-10-CM

## 2024-09-27 LAB
BASOPHILS # BLD AUTO: 0.07 THOUSANDS/ΜL (ref 0–0.1)
BASOPHILS NFR BLD AUTO: 1 % (ref 0–1)
C3 SERPL-MCNC: 127 MG/DL (ref 87–200)
C4 SERPL-MCNC: 39 MG/DL (ref 19–52)
EOSINOPHIL # BLD AUTO: 0.41 THOUSAND/ΜL (ref 0–0.61)
EOSINOPHIL NFR BLD AUTO: 7 % (ref 0–6)
ERYTHROCYTE [DISTWIDTH] IN BLOOD BY AUTOMATED COUNT: 14.2 % (ref 11.6–15.1)
EST. AVERAGE GLUCOSE BLD GHB EST-MCNC: 126 MG/DL
HBA1C MFR BLD: 6 %
HCT VFR BLD AUTO: 45.9 % (ref 36.5–49.3)
HGB BLD-MCNC: 14.7 G/DL (ref 12–17)
IGA SERPL-MCNC: 261 MG/DL (ref 66–433)
IGG SERPL-MCNC: 1059 MG/DL (ref 635–1741)
IGM SERPL-MCNC: 121 MG/DL (ref 45–281)
IMM GRANULOCYTES # BLD AUTO: 0.03 THOUSAND/UL (ref 0–0.2)
IMM GRANULOCYTES NFR BLD AUTO: 1 % (ref 0–2)
LYMPHOCYTES # BLD AUTO: 1.59 THOUSANDS/ΜL (ref 0.6–4.47)
LYMPHOCYTES NFR BLD AUTO: 26 % (ref 14–44)
MCH RBC QN AUTO: 28.1 PG (ref 26.8–34.3)
MCHC RBC AUTO-ENTMCNC: 32 G/DL (ref 31.4–37.4)
MCV RBC AUTO: 88 FL (ref 82–98)
MONOCYTES # BLD AUTO: 0.46 THOUSAND/ΜL (ref 0.17–1.22)
MONOCYTES NFR BLD AUTO: 7 % (ref 4–12)
NEUTROPHILS # BLD AUTO: 3.63 THOUSANDS/ΜL (ref 1.85–7.62)
NEUTS SEG NFR BLD AUTO: 58 % (ref 43–75)
NRBC BLD AUTO-RTO: 0 /100 WBCS
PLATELET # BLD AUTO: 335 THOUSANDS/UL (ref 149–390)
PMV BLD AUTO: 9.5 FL (ref 8.9–12.7)
RBC # BLD AUTO: 5.24 MILLION/UL (ref 3.88–5.62)
WBC # BLD AUTO: 6.19 THOUSAND/UL (ref 4.31–10.16)

## 2024-09-27 PROCEDURE — 86317 IMMUNOASSAY INFECTIOUS AGENT: CPT

## 2024-09-27 PROCEDURE — 86162 COMPLEMENT TOTAL (CH50): CPT

## 2024-09-27 PROCEDURE — 82785 ASSAY OF IGE: CPT

## 2024-09-27 PROCEDURE — 82784 ASSAY IGA/IGD/IGG/IGM EACH: CPT

## 2024-09-27 PROCEDURE — 83520 IMMUNOASSAY QUANT NOS NONAB: CPT

## 2024-09-27 PROCEDURE — 86360 T CELL ABSOLUTE COUNT/RATIO: CPT

## 2024-09-27 PROCEDURE — 83993 ASSAY FOR CALPROTECTIN FECAL: CPT

## 2024-09-27 PROCEDURE — 36415 COLL VENOUS BLD VENIPUNCTURE: CPT

## 2024-09-27 PROCEDURE — 82787 IGG 1 2 3 OR 4 EACH: CPT

## 2024-09-27 PROCEDURE — 86160 COMPLEMENT ANTIGEN: CPT

## 2024-09-27 PROCEDURE — 83036 HEMOGLOBIN GLYCOSYLATED A1C: CPT

## 2024-09-27 PROCEDURE — 86359 T CELLS TOTAL COUNT: CPT

## 2024-09-27 PROCEDURE — 86355 B CELLS TOTAL COUNT: CPT

## 2024-09-27 PROCEDURE — 85025 COMPLETE CBC W/AUTO DIFF WBC: CPT

## 2024-09-28 LAB
BASOPHILS # BLD AUTO: 0.1 X10E3/UL (ref 0–0.2)
BASOPHILS NFR BLD AUTO: 2 %
CD19 CELLS # BLD: 185 /UL (ref 12–645)
CD19 CELLS NFR BLD: 10.9 % (ref 3.3–25.4)
CD3 CELLS # BLD: 1346 /UL (ref 622–2402)
CD3 CELLS NFR BLD: 79.2 % (ref 57.5–86.2)
CD3+CD4+ CELLS # BLD: 966 /UL (ref 359–1519)
CD3+CD4+ CELLS NFR BLD: 56.8 % (ref 30.8–58.5)
CD3+CD4+ CELLS/CD3+CD8+ CLL BLD: 3.44 % (ref 0.92–3.72)
CD3+CD8+ CELLS # BLD: 281 /UL (ref 109–897)
CD3+CD8+ CELLS NFR BLD: 16.5 % (ref 12–35.5)
EOSINOPHIL # BLD AUTO: 0.4 X10E3/UL (ref 0–0.4)
EOSINOPHIL NFR BLD AUTO: 7 %
ERYTHROCYTE [DISTWIDTH] IN BLOOD BY AUTOMATED COUNT: 14 % (ref 11.6–15.4)
HCT VFR BLD AUTO: 45.5 % (ref 37.5–51)
HGB BLD-MCNC: 14.5 G/DL (ref 13–17.7)
IMM GRANULOCYTES # BLD: 0 X10E3/UL (ref 0–0.1)
IMM GRANULOCYTES NFR BLD: 0 %
LYMPHOCYTES # BLD AUTO: 1.7 X10E3/UL (ref 0.7–3.1)
LYMPHOCYTES NFR BLD AUTO: 27 %
MCH RBC QN AUTO: 27.6 PG (ref 26.6–33)
MCHC RBC AUTO-ENTMCNC: 31.9 G/DL (ref 31.5–35.7)
MCV RBC AUTO: 87 FL (ref 79–97)
MONOCYTES # BLD AUTO: 0.5 X10E3/UL (ref 0.1–0.9)
MONOCYTES NFR BLD AUTO: 7 %
NEUTROPHILS # BLD AUTO: 3.5 X10E3/UL (ref 1.4–7)
NEUTROPHILS NFR BLD AUTO: 57 %
PLATELET # BLD AUTO: 322 X10E3/UL (ref 150–450)
RBC # BLD AUTO: 5.26 X10E6/UL (ref 4.14–5.8)
WBC # BLD AUTO: 6.2 X10E3/UL (ref 3.4–10.8)

## 2024-09-29 LAB — TOTAL IGE SMQN RAST: 53.8 KU/L (ref 0–113)

## 2024-09-30 LAB
CALPROTECTIN STL-MCNC: 58.5 ÂΜG/G
CH50 SERPL-ACNC: 58 U/ML

## 2024-10-01 LAB
IGG SERPL-MCNC: 1144 MG/DL (ref 603–1613)
IGG1 SER-MCNC: 489 MG/DL (ref 248–810)
IGG2 SER-MCNC: 303 MG/DL (ref 130–555)
IGG3 SER-MCNC: 60 MG/DL (ref 15–102)
IGG4 SER-MCNC: 154 MG/DL (ref 2–96)

## 2024-10-03 LAB
DEPRECATED S PNEUM14 IGG SER-MCNC: 0.2 UG/ML
DEPRECATED S PNEUM19 IGG SER-MCNC: 0.3 UG/ML
DEPRECATED S PNEUM23 IGG SER-MCNC: 0.2 UG/ML
DEPRECATED S PNEUM3 IGG SER-MCNC: 0.2 UG/ML
DEPRECATED S PNEUM4 IGG SER-MCNC: 0.3 UG/ML
DEPRECATED S PNEUM8 AB SER-MCNC: 2.7 UG/ML
DEPRECATED S PNEUM9 IGG SER-MCNC: 1.2 UG/ML
FLUBV RNA SPEC QL NAA+PROBE: <0.1 UG/ML
MANNOSE-BP SER-MCNC: 6720 NG/ML
S PNEUM DA 18C IGG SER-MCNC: 0.5 UG/ML
S PNEUM DA 19A IGG SER-MCNC: 2.1 UG/ML
S PNEUM DA 6B IGG SER-MCNC: 1.9 UG/ML
SL AMB PNEUMO AB TYPE 17 (17F): 0.5 UG/ML
SL AMB PNEUMO AB TYPE 20: 6.2 UG/ML
SL AMB PNEUMO AB TYPE 22 (22F): 0.4 UG/ML
SL AMB PNEUMO AB TYPE 2: 3.8 UG/ML
SL AMB PNEUMO AB TYPE 34 (10A): 0.2 UG/ML
SL AMB PNEUMO AB TYPE 43 (11A): 0.7 UG/ML
SL AMB PNEUMO AB TYPE 54 (15B): 1.4 UG/ML
SL AMB PNEUMO AB TYPE 5: 0.1 UG/ML
SL AMB PNEUMO AB TYPE 70 (33F): 0.6 UG/ML
STREP PNEUMO TYPE 1: 3.9 UG/ML
STREP PNEUMO TYPE 51: 0.5 UG/ML
STREP PNEUMO TYPE 68: <0.1 UG/ML

## 2024-10-10 ENCOUNTER — TELEPHONE (OUTPATIENT)
Age: 34
End: 2024-10-10

## 2024-10-10 NOTE — TELEPHONE ENCOUNTER
Called and spoke with the patient.  He asked if he has true Crohn's disease since he was told he has appendicitis.  I reviewed pathology and explained that he does have Crohn's disease.  We also discussed whether or not he should start any infusions.  I did recommend that he start on postop prophylaxis.  He said he will figure out his insurance issues and then we can potentially get started.

## 2024-10-10 NOTE — TELEPHONE ENCOUNTER
----- Message from Lalitha MARTÍNEZ sent at 10/9/2024  4:26 PM EDT -----  Good afternoon Dr Freedman,     I spoke with pt and have related the results from Flex, pt has understood but wanted me to reach out to you and send you a message, whenever you have a chance pt would like for you to give him a call regarding injections he would like to have a discussion with you.    Thank you.

## 2024-10-16 NOTE — PROGRESS NOTES
Ambulatory Visit  Name: Victor Hugo Hurtado      : 1990      MRN: [id number]  Encounter Provider: Jana Lugo MD  Encounter Date: 10/18/2024   Encounter department: [facility] [place]    Assessment & Plan  Crohn's disease without complication, unspecified gastrointestinal tract location (HCC)  Patient is doing well, tolerating a diet, and having regular bowel function without bleeding  He denies abdominal pain  We reviewed pathology today, and although specimen showed acute appendicitis, I explained that this is likely secondary to adjacent inflammation from active Crohn's disease  He is currently maintained on biologic therapy, managed by gastroenterology       Incisional hernia, without obstruction or gangrene  Patient does have a small defect at the umbilicus  I have referred him to Dr. Dow of general surgery, although he is unsure if he would like to undergo surgery at this time  Orders:    Ambulatory Referral to General Surgery; Future      History of Present Illness       Victor Hugo Hurtado is a 33 y.o. male with a history of Crohn's disease who presents for a follow up evaluation. He was last seen in the office post operatively on 24.    The patient is status post hand assisted laparoscopic ileocolectomy on 2024.     The patient states that he is doing well.   He reports some cramping around the incision site. He attributes this to the cold and reports that it resolves after he warms up.     He has a daily bowel movement. The consistency of the stool varies.He denies rectal bleeding and blood in the stool.     History obtained from : patient  Review of Systems   Constitutional:  Negative for chills and fever.   HENT:  Negative for ear pain and sore throat.    Eyes:  Negative for pain and visual disturbance.   Respiratory:  Negative for cough and shortness of breath.    Cardiovascular:  Negative for chest pain and palpitations.    Gastrointestinal:  Negative for abdominal pain and vomiting.   Genitourinary:  Negative for dysuria and hematuria.   Musculoskeletal:  Negative for arthralgias and back pain.   Skin:  Negative for color change and rash.   Neurological:  Negative for seizures and syncope.   All other systems reviewed and are negative.    Past Medical History   Past Medical History:   Diagnosis Date    Crohn's colitis (HCC)      Past Surgical History:   Procedure Laterality Date    COLONOSCOPY      MA LAPAROSCOPY COLECTOMY PARTIAL W/ANASTOMOSIS N/A 6/26/2024    Procedure: HAND-ASSISTED LAPAROSCOPIC ILEOCECETOMY;  Surgeon: Jana Lugo MD;  [facility];  Service: Colorectal    MA SIGMOIDOSCOPY FLX DX W/COLLJ SPEC BR/WA IF PFRMD N/A 6/26/2024    Procedure: SIGMOIDOSCOPY FLEXIBLE;  Surgeon: Jana Lugo MD;  Location[facility];  Service: Colorectal     Family History   Problem Relation Age of Onset    No Known Problems Mother     No Known Problems Father     Colon cancer Neg Hx     Colon polyps Neg Hx      Current Outpatient Medications on File Prior to Visit   Medication Sig Dispense Refill    Azelastine HCl 137 MCG/SPRAY SOLN INSERT 1 TO 2 SPRAYS INTO EACH NOSTRIL TWICE A DAY AS NEEDED FOR RHINITIS. USE IN EACH NOSTRIL AS DIRECTED. 90 mL 1    Cholecalciferol (Vitamin D) 125 MCG (5000 UT) CAPS Take 1 capsule by mouth in the morning 90 capsule 3    vitamin B-12 (VITAMIN B-12) 1,000 mcg tablet Take 1 tablet (1,000 mcg total) by mouth daily 90 tablet 3     No current facility-administered medications on file prior to visit.     Allergies   Allergen Reactions    Ammonia Nasal Congestion    Molds & Smuts Other (See Comments)     Discovered through allergen testing          Objective     There were no vitals taken for this visit.    Physical Exam  Vitals and nursing note reviewed.   Constitutional:       General: He is not in acute distress.     Appearance: He is well-developed.   HENT:      Head:  Normocephalic and atraumatic.   Eyes:      Conjunctiva/sclera: Conjunctivae normal.   Cardiovascular:      Rate and Rhythm: Normal rate and regular rhythm.      Heart sounds: No murmur heard.  Pulmonary:      Effort: Pulmonary effort is normal. No respiratory distress.      Breath sounds: Normal breath sounds.   Abdominal:      Palpations: Abdomen is soft.      Tenderness: There is no abdominal tenderness.      Comments: 1 cm defect at umbilicus   Musculoskeletal:         General: No swelling.      Cervical back: Neck supple.   Skin:     General: Skin is warm and dry.      Capillary Refill: Capillary refill takes less than 2 seconds.   Neurological:      Mental Status: He is alert.   Psychiatric:         Mood and Affect: Mood normal.       Administrative Statements   I have spent a total time of 20 minutes in caring for this patient on the day of the visit/encounter including Diagnostic results, Prognosis, Risks and benefits of tx options, Instructions for management, Patient and family education, Importance of tx compliance, Risk factor reductions, Impressions, Counseling / Coordination of care, Documenting in the medical record, Reviewing / ordering tests, medicine, procedures  , Obtaining or reviewing history  , and Communicating with other healthcare professionals .

## 2024-10-18 ENCOUNTER — OFFICE VISIT (OUTPATIENT)
Age: 34
End: 2024-10-18
Payer: COMMERCIAL

## 2024-10-18 VITALS — HEIGHT: 68 IN | BODY MASS INDEX: 22.28 KG/M2 | WEIGHT: 147 LBS

## 2024-10-18 DIAGNOSIS — K43.2 INCISIONAL HERNIA, WITHOUT OBSTRUCTION OR GANGRENE: ICD-10-CM

## 2024-10-18 DIAGNOSIS — K50.90 CROHN'S DISEASE WITHOUT COMPLICATION, UNSPECIFIED GASTROINTESTINAL TRACT LOCATION (HCC): Primary | ICD-10-CM

## 2024-10-18 PROCEDURE — 99213 OFFICE O/P EST LOW 20 MIN: CPT | Performed by: SURGERY

## 2024-10-29 ENCOUNTER — NURSE TRIAGE (OUTPATIENT)
Age: 34
End: 2024-10-29

## 2024-10-29 DIAGNOSIS — K50.014 CROHN'S DISEASE OF SMALL INTESTINE WITH ABSCESS (HCC): Primary | ICD-10-CM

## 2024-10-29 DIAGNOSIS — R20.0 BILATERAL HAND NUMBNESS: ICD-10-CM

## 2024-10-29 DIAGNOSIS — H53.9 VISION CHANGES: ICD-10-CM

## 2024-10-29 NOTE — TELEPHONE ENCOUNTER
I called and spoke with the patient. Relayed provider message STAT referral was placed for ophthalmology consult and provider recommend discontinuing infliximab and switching to risankizumab. Educated patient on Skyrizi. Provided patient with [facility] number to call to set up ophthalmology office visit. Pt aware I will also send Skyrizi information via [facility].

## 2024-10-29 NOTE — TELEPHONE ENCOUNTER
Discussed with Dr. Freedman.  Given vision changes, recommend STAT ophthalmology consult.  Also recommend discontinuing infliximab and switching to risankizumab.  I attempted to call patient to discuss these recommendations, but patient did not answer.  Will re-attempt.

## 2024-10-29 NOTE — LETTER
November 1, 2024         Patient: Victor Hugo Hurtado   YOB: 1990           To whom it may concern,    Please excuse Victor Hugo Hurtado from work from 10/28/2024 PM to 11/1/2024 due to side effects from treatment. Mr. Hurtado may return to work on 11/4/2024.       Please call with any questions,    [facility]

## 2024-10-29 NOTE — TELEPHONE ENCOUNTER
"Patient calling with possible side effects after 1st injection of inflixamab.  Patient took 1st dose on Tues 10/22.  Thurs 10/24 started with numbness (lack of blood circulation per patient) to hands.  Continued through weekend.  Went to work on Monday and numbness came back but started with bright light then vision became blurry.  Had to leave work early due to vision issues and took off today.  Yesterday had bright red rectal bleeding - no clots.  Unable to say if in stool or not.  Does have hx of hemorrhoids.  Denies pain, fevers, or rash.  Please advise.            Reason for Disposition   Caller has NON-URGENT medicine question about med that PCP or specialist prescribed and triager unable to answer question    Answer Assessment - Initial Assessment Questions  1. NAME of MEDICINE: \"What medicine(s) are you calling about?\"      infliximab  2. QUESTION: \"What is your question?\" (e.g., double dose of medicine, side effect)      Side effects  3. PRESCRIBER: \"Who prescribed the medicine?\" Reason: if prescribed by specialist, call should be referred to that group.      [facility]  4. SYMPTOMS: \"Do you have any symptoms?\" If Yes, ask: \"What symptoms are you having?\"  \"How bad are the symptoms (e.g., mild, moderate, severe)      moderate    Protocols used: Medication Question Call-Adult-[place]    "

## 2024-10-30 NOTE — TELEPHONE ENCOUNTER
Called patient, reached voicemail, left message to call back as provider is recommending additional bloodwork.

## 2024-10-31 ENCOUNTER — APPOINTMENT (OUTPATIENT)
Dept: LAB | Facility: HOSPITAL | Age: 34
End: 2024-10-31
Payer: COMMERCIAL

## 2024-10-31 DIAGNOSIS — K50.014 CROHN'S DISEASE OF SMALL INTESTINE WITH ABSCESS (HCC): ICD-10-CM

## 2024-10-31 LAB — HCV AB SER QL: NORMAL

## 2024-10-31 PROCEDURE — 86803 HEPATITIS C AB TEST: CPT

## 2024-10-31 PROCEDURE — 36415 COLL VENOUS BLD VENIPUNCTURE: CPT

## 2024-10-31 NOTE — TELEPHONE ENCOUNTER
Ellen, PCP's , transferred to myself by Neeru.    Ellen reports pt called in this morning stating he was referred to PCP to resolve side effects attributed to Inflixamab. First dose was taken 10/22. Please see telephone encounter 10/29 by SHANNON Vann, for further review of symptoms.    Per chart pt was called yesterday with provider recommendations including discontinuation of Inflixamab, STAT ophthalmology OV, Hep C blood work, and trial of Skyrizi.    Please return call to pt for clarification/update on symptoms and to review steps moving forward.

## 2024-10-31 NOTE — TELEPHONE ENCOUNTER
"Reason for Disposition  • Caller is not with the adult (patient) AND patient has probable NON-URGENT symptoms    Answer Assessment - Initial Assessment Questions  1. REASON FOR CALL: \"What is the main reason for your call?\" or \"How can I best help you?\"     Please see note    Protocols used: Information Only Call - No Triage-Adult-[place]    "

## 2024-10-31 NOTE — TELEPHONE ENCOUNTER
Called patient, reached voicemail, left message to call back to discuss referral in more detail and provide phone number.

## 2024-11-01 NOTE — TELEPHONE ENCOUNTER
I called and spoke with the patient. Pt saw ophthalmology- pt does not have official documentation however was able to relay floaters and slight pressure was present during exam and the ophthalmologist said his nerves looked good.     Pt requesting work excuse from 10/28 PM until 11/1 due to hand numbness. Letter sent via [employer].     Pt scheduled with Neurology is January and placed on the wait list.     Pt states he feels numbness from his shoulder, down his elbows and hands which gradually gets worse throughout the day, he says the numbness comes and goes throughout the day.

## 2024-11-04 ENCOUNTER — CONSULT (OUTPATIENT)
Dept: NEUROLOGY | Facility: CLINIC | Age: 34
End: 2024-11-04
Payer: COMMERCIAL

## 2024-11-04 VITALS
DIASTOLIC BLOOD PRESSURE: 82 MMHG | HEART RATE: 84 BPM | BODY MASS INDEX: 21.59 KG/M2 | WEIGHT: 142 LBS | SYSTOLIC BLOOD PRESSURE: 118 MMHG | OXYGEN SATURATION: 97 % | TEMPERATURE: 98.2 F

## 2024-11-04 DIAGNOSIS — H53.9 VISION CHANGES: ICD-10-CM

## 2024-11-04 DIAGNOSIS — R20.0 BILATERAL HAND NUMBNESS: ICD-10-CM

## 2024-11-04 DIAGNOSIS — R20.0 NUMBNESS: Primary | ICD-10-CM

## 2024-11-04 PROCEDURE — 99204 OFFICE O/P NEW MOD 45 MIN: CPT | Performed by: PSYCHIATRY & NEUROLOGY

## 2024-11-04 NOTE — PROGRESS NOTES
"Please note: this note was created with voice recognition software. Occasional wrong word or \"sound alike\" substitutions may occur due to the inherent limitations of voice recognition software. Read the chart carefully and recognize (using context) where substitutions may have occurred. I am available to discuss any questions.       1. Numbness  Assessment & Plan:  Mr Hurtado developed bilateral hand tingling with lightheadedness and headache.  He now has occasional tingling that can involve either hand.  Given the temporal relationship to his first dose of Remicade, my main suspicion is that his medicines were a consequence.  In my experience I have not seen a single dose of Remicade because intracranial disease but he needs a cranial MRI to make sure there is no structural abnormality that might account for his symptoms.  Obviously, patients with 1 immune-mediated disease are at increased risk of developing another.  However, his neurologic examination is nonlocalizing which I told him is a good indicator from my standpoint.  He will call me after his MRI is completed.  Further measures would be considered based on the results and based on his clinical course.  If he does not have a cause that is seen then the next step would be to decide whether he can receive another dose of Remicade or whether he would need to try something else; this would be up to his gastroenterologist.  All of his questions were answered and he is in agreement with this plan.  Orders:  -     MRI brain without contrast; Future; Expected date: 11/04/2024  2. Vision changes  -     Ambulatory Referral to Neurology  3. Bilateral hand numbness  -     Ambulatory Referral to Neurology  -     MRI brain without contrast; Future; Expected date: 11/04/2024      Problem List Items Addressed This Visit       Numbness - Primary     Mr Hurtado developed bilateral hand tingling with lightheadedness and headache.  He now has occasional tingling that can " involve either hand.  Given the temporal relationship to his first dose of Remicade, my main suspicion is that his medicines were a consequence.  In my experience I have not seen a single dose of Remicade because intracranial disease but he needs a cranial MRI to make sure there is no structural abnormality that might account for his symptoms.  Obviously, patients with 1 immune-mediated disease are at increased risk of developing another.  However, his neurologic examination is nonlocalizing which I told him is a good indicator from my standpoint.  He will call me after his MRI is completed.  Further measures would be considered based on the results and based on his clinical course.  If he does not have a cause that is seen then the next step would be to decide whether he can receive another dose of Remicade or whether he would need to try something else; this would be up to his gastroenterologist.  All of his questions were answered and he is in agreement with this plan.         Relevant Orders    MRI brain without contrast     Other Visit Diagnoses       Vision changes        Bilateral hand numbness        Relevant Orders    MRI brain without contrast            Problem List       Anemia    Cigarette nicotine dependence without complication    Clostridioides difficile infection    Crohn's disease of small intestine with abscess (HCC)    Intestinal abscess    Leukocytosis    Lung nodule    Numbness    Current Assessment & Plan     Mr Hurtado developed bilateral hand tingling with lightheadedness and headache.  He now has occasional tingling that can involve either hand.  Given the temporal relationship to his first dose of Remicade, my main suspicion is that his medicines were a consequence.  In my experience I have not seen a single dose of Remicade because intracranial disease but he needs a cranial MRI to make sure there is no structural abnormality that might account for his symptoms.  Obviously, patients with 1  "immune-mediated disease are at increased risk of developing another.  However, his neurologic examination is nonlocalizing which I told him is a good indicator from my standpoint.  He will call me after his MRI is completed.  Further measures would be considered based on the results and based on his clinical course.  If he does not have a cause that is seen then the next step would be to decide whether he can receive another dose of Remicade or whether he would need to try something else; this would be up to his gastroenterologist.  All of his questions were answered and he is in agreement with this plan.         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 male, for neuromuscular consultation. The referral was for numbness.     He has a history of Crohn's disease diagnosed last year which was initially treated with Entyvio.  His case was complicated by an abscess and stricture that progressed to an abscess.  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  On October 22 he received his first dose of Remicade.  1 week ago while working he felt a tingling shooting down from the right shoulder to his hand.  He lost his  on his stools but developed symmetrical pins and numbness in both of his hands.  He then had a change in his vision; \"white or bright was too bright\" associated with lightheadedness.  He then developed a 4/10 symmetrical throbbing headache behind his eyes traveling to the temples, which lasted for 2 or 3 hours.  He went home and went to sleep but when he woke up the next morning felt \"like I was in another world... Dazed, sluggish\".  He continues to " "have intermittent hand tingling \"like you hit your funny bone\" generally occurring on 1 side.  Either hand can be affected but neither 1 seems to be particularly worse than the other.  He has no difficulty with fine motor activity.  He has no neck pain, incontinence, or erectile dysfunction.  He saw an eye doctor and his examination was apparently negative.  He has a history of smoking but no history of illicit substance abuse or alcohol abuse.      Past Medical History:   Diagnosis Date    Crohn's colitis (HCC)        Past Surgical History:   Procedure Laterality Date    COLONOSCOPY      [place] LAPAROSCOPY COLECTOMY PARTIAL W/ANASTOMOSIS N/A 6/26/2024    Procedure: HAND-ASSISTED LAPAROSCOPIC ILEOCECETOMY;  Surgeon: Jana Lugo MD;  Location: [facility];  Service: Colorectal    [place] SIGMOIDOSCOPY FLX DX W/COLLJ SPEC BR/WA IF PFRMD N/A 6/26/2024    Procedure: SIGMOIDOSCOPY FLEXIBLE;  Surgeon: Jana Lugo MD;  Location: [facility];  Service: Colorectal       Ammonia and Molds & smuts    Social History     Tobacco Use   Smoking Status Every Day    Current packs/day: 0.50    Types: Cigarettes   Smokeless Tobacco Never       Social History     Substance and Sexual Activity   Alcohol Use Not Currently       Social History     Substance and Sexual Activity   Drug Use Yes    Types: Marijuana    Comment: states 2 ounces/month, medical card       Family History   Problem Relation Age of Onset    No Known Problems Mother     No Known Problems Father     Colon cancer Neg Hx     Colon polyps Neg Hx          Current Outpatient Medications:     Azelastine HCl 137 MCG/SPRAY SOLN, INSERT 1 TO 2 SPRAYS INTO EACH NOSTRIL TWICE A DAY AS NEEDED FOR RHINITIS. USE IN EACH NOSTRIL AS DIRECTED., Disp: 90 mL, Rfl: 1    Cholecalciferol (Vitamin D) 125 MCG (5000 UT) CAPS, Take 1 capsule by mouth in the morning, Disp: 90 capsule, Rfl: 3    vitamin B-12 (VITAMIN B-12) 1,000 mcg tablet, Take 1 tablet (1,000 mcg total) by " mouth daily, Disp: 90 tablet, Rfl: 3    Appointment on 10/31/2024   Component Date Value Ref Range Status    Hepatitis C Ab 10/31/2024 Non-reactive  Non-Reactive Final   Appointment on 09/27/2024   Component Date Value Ref Range Status    Hemoglobin A1C 09/27/2024 6.0 (H)  Normal 4.0-5.6%; PreDiabetic 5.7-6.4%; Diabetic >=6.5%; Glycemic control for adults with diabetes <7.0% % Final    EAG 09/27/2024 126  mg/dl Final    Calprotectin, Fecal 09/27/2024 58.5  <=120 µg/g Final    Calprotectin Concentration Result    <50 µg/g    Normal   50-120µg/g  Borderline   >120 µg/g   Elevated    Re-evaluation of borderline fecal calprotectin levels after 4-6 weeks is recommended to determine the inflammatory status. This decision should be made by the clinician in conjunction with the patient's clinical symptoms, medical history, and other clinical and laboratory findings.     Results may not be clinically applicable to children less than two years of age who have mildly increased fecal calprotectin levels.  Assay results should be utilized in conjunction with other clinical and laboratory data to assist the clinician in making individual patient management decisions.      WBC 09/27/2024 6.19  4.31 - 10.16 Thousand/uL Final    RBC 09/27/2024 5.24  3.88 - 5.62 Million/uL Final    Hemoglobin 09/27/2024 14.7  12.0 - 17.0 g/dL Final    Hematocrit 09/27/2024 45.9  36.5 - 49.3 % Final    MCV 09/27/2024 88  82 - 98 fL Final    MCH 09/27/2024 28.1  26.8 - 34.3 pg Final    MCHC 09/27/2024 32.0  31.4 - 37.4 g/dL Final    RDW 09/27/2024 14.2  11.6 - 15.1 % Final    MPV 09/27/2024 9.5  8.9 - 12.7 fL Final    Platelets 09/27/2024 335  149 - 390 Thousands/uL Final    nRBC 09/27/2024 0  /100 WBCs Final    Segmented % 09/27/2024 58  43 - 75 % Final    Immature Grans % 09/27/2024 1  0 - 2 % Final    Lymphocytes % 09/27/2024 26  14 - 44 % Final    Monocytes % 09/27/2024 7  4 - 12 % Final    Eosinophils Relative 09/27/2024 7 (H)  0 - 6 % Final     Basophils Relative 09/27/2024 1  0 - 1 % Final    Absolute Neutrophils 09/27/2024 3.63  1.85 - 7.62 Thousands/µL Final    Absolute Immature Grans 09/27/2024 0.03  0.00 - 0.20 Thousand/uL Final    Absolute Lymphocytes 09/27/2024 1.59  0.60 - 4.47 Thousands/µL Final    Absolute Monocytes 09/27/2024 0.46  0.17 - 1.22 Thousand/µL Final    Eosinophils Absolute 09/27/2024 0.41  0.00 - 0.61 Thousand/µL Final    Basophils Absolute 09/27/2024 0.07  0.00 - 0.10 Thousands/µL Final    IgE 09/27/2024 53.8  0 - 113 kU/l Final    IGA 09/27/2024 261  66 - 433 mg/dL Final    IGG 09/27/2024 1,059  635 - 1,741 mg/dL Final    IGM 09/27/2024 121  45 - 281 mg/dL Final    MANNOSE BINDING LECTIN 09/27/2024 6720  ng/mL Final                                 Low:             0 -  50                               Intermediate:   51 - 500                               Normal:             >500    CD19 Abs 09/27/2024 185  12 - 645 /uL Final    CD3 ABS 09/27/2024 1346  622 - 2402 /uL Final    CD4 ABS 09/27/2024 966  359 - 1519 /uL Final    CD8 SUPPRESSOR ABS 09/27/2024 281  109 - 897 /uL Final    CD19+ LYMPHS % 09/27/2024 10.9  3.3 - 25.4 % Final    CD3 Pos Lymphs % 09/27/2024 79.2  57.5 - 86.2 % Final    CD4 % 09/27/2024 56.8  30.8 - 58.5 % Final    CD8  LYMPH % 09/27/2024 16.5  12.0 - 35.5 % Final    CD4/CD8 Ratio 09/27/2024 3.44  0.92 - 3.72 Final    White Blood Cell Count 09/27/2024 6.2  3.4 - 10.8 x10E3/uL Final    Red Blood Cell Count 09/27/2024 5.26  4.14 - 5.80 x10E6/uL Final    Hemoglobin 09/27/2024 14.5  13.0 - 17.7 g/dL Final    HCT 09/27/2024 45.5  37.5 - 51.0 % Final    MCV 09/27/2024 87  79 - 97 fL Final    MCH 09/27/2024 27.6  26.6 - 33.0 pg Final    MCHC 09/27/2024 31.9  31.5 - 35.7 g/dL Final    RDW 09/27/2024 14.0  11.6 - 15.4 % Final    Platelet Count 09/27/2024 322  150 - 450 x10E3/uL Final    Neutrophils 09/27/2024 57  Not Estab. % Final    Lymphocytes 09/27/2024 27  Not Estab. % Final    Monocytes 09/27/2024 7  Not Estab. %  Final    Eosinophils 09/27/2024 7  Not Estab. % Final    Basophils PCT 09/27/2024 2  Not Estab. % Final    Neutrophils (Absolute) 09/27/2024 3.5  1.4 - 7.0 x10E3/uL Final    Lymphocytes (Absolute) 09/27/2024 1.7  0.7 - 3.1 x10E3/uL Final    Monocytes (Absolute) 09/27/2024 0.5  0.1 - 0.9 x10E3/uL Final    Eosinophils (Absolute) 09/27/2024 0.4  0.0 - 0.4 x10E3/uL Final    Basophils ABS 09/27/2024 0.1  0.0 - 0.2 x10E3/uL Final    Immature Granulocytes 09/27/2024 0  Not Estab. % Final    Immature Granulocytes (Absolute) 09/27/2024 0.0  0.0 - 0.1 x10E3/uL Final    STREP PNEUMO TYPE1 09/27/2024 3.9  >1.3 ug/mL Final    Pneumococcal antibody Type 3 09/27/2024 0.2 (L)  >1.3 ug/mL Final    Strep pneumo Type 4 09/27/2024 0.3 (L)  >1.3 ug/mL Final    Pneumococcal antibody Type 8 09/27/2024 2.7  >1.3 ug/mL Final    Strep pneumo Type 9 09/27/2024 1.2 (L)  >1.3 ug/mL Final    Strep pneumo Type 12 09/27/2024 <0.1 (L)  >1.3 ug/mL Final    Strep pneumo Type 14 09/27/2024 0.2 (L)  >1.3 ug/mL Final    Pneumo Ab Type 17 (17F) 09/27/2024 0.5 (L)  >1.3 ug/mL Final    Strep pneumo Type 19 09/27/2024 0.3 (L)  >1.3 ug/mL Final    Pneumo Ab Type 2 09/27/2024 3.8  >1.3 ug/mL Final    Pneumo Ab Type 20 09/27/2024 6.2  >1.3 ug/mL Final    Pneumo Ab Type 22 (22F) 09/27/2024 0.4 (L)  >1.3 ug/mL Final    Pneumococcal antibody Type 23 09/27/2024 0.2 (L)  >1.3 ug/mL Final    Strep pneumo Type 26 09/27/2024 1.9  >1.3 ug/mL Final    Pneumo Ab Type 43 (11A) 09/27/2024 0.7 (L)  >1.3 ug/mL Final    Pneumo Ab Type 5 09/27/2024 0.1 (L)  >1.3 ug/mL Final    Strep pneumo Type 51 09/27/2024 0.5 (L)  >1.3 ug/mL Final    Pneumo Ab Type 54 (15B) 09/27/2024 1.4  >1.3 ug/mL Final    Strep pneumo Type 56 09/27/2024 0.5 (L)  >1.3 ug/mL Final    Strep pneumo Type 57 09/27/2024 2.1  >1.3 ug/mL Final    Strep pneumo Type 68 09/27/2024 <0.1 (L)  >1.3 ug/mL Final    Pneumo Ab Type 70 (33F) 09/27/2024 0.6 (L)  >1.3 ug/mL Final    *This test was developed and its  "performance  characteristics determined by [facility]. It has not  been cleared or approved by the U.S. Food and Drug  Administration.    FLAG Interpretation: A = Abnormal, H = High, L = Low    Pneumo Ab Type 34 (10A) 09/27/2024 0.2 (L)  >1.3 ug/mL Final    IgG 1 09/27/2024 489  248 - 810 mg/dL Final    IgG 2 09/27/2024 303  130 - 555 mg/dL Final    IgG 3 09/27/2024 60  15 - 102 mg/dL Final    IgG 4 09/27/2024 154 (H)  2 - 96 mg/dL Final    IgG 09/27/2024 1144  603 - 1613 mg/dL Final    C3 Complement 09/27/2024 127  87 - 200 mg/dL Final    C4, COMPLEMENT 09/27/2024 39  19 - 52 mg/dL Final    Compl, Total (CH50) 09/27/2024 58  >41 U/mL Final                 Age                Male          Female            1 - 30 days         Not Estab.     Not Estab.      31 days -  6 months          >32            >20     7 months - 17 years           >39            >39               >17 years           >41            >41   **NOTE: The adult (\">17 years\") reference interval**           range is used to flag abnormals on this           report. If the patient is 17 years old or           younger, use the table above to determine           out of range values.   Appointment on 07/26/2024   Component Date Value Ref Range Status    Vit D, 25-Hydroxy 07/26/2024 27.9 (L)  30.0 - 100.0 ng/mL Final    Vitamin D guidelines established by Clinical Guidelines Subcommittee  of the Endocrine Society Task Force, 2011    Deficiency <20ng/ml   Insufficiency 20-30ng/ml   Sufficient  ng/ml     Vitamin B-12 07/26/2024 260  180 - 914 pg/mL Final    Sed Rate 07/26/2024 10  0 - 14 mm/hour Final    WBC 07/26/2024 6.39  4.31 - 10.16 Thousand/uL Final    RBC 07/26/2024 4.87  3.88 - 5.62 Million/uL Final    Hemoglobin 07/26/2024 13.4  12.0 - 17.0 g/dL Final    Hematocrit 07/26/2024 42.9  36.5 - 49.3 % Final    MCV 07/26/2024 88  82 - 98 fL Final    MCH 07/26/2024 27.5  26.8 - 34.3 pg Final    MCHC 07/26/2024 31.2 (L)  31.4 - 37.4 g/dL Final    " RDW 07/26/2024 17.0 (H)  11.6 - 15.1 % Final    MPV 07/26/2024 9.5  8.9 - 12.7 fL Final    Platelets 07/26/2024 304  149 - 390 Thousands/uL Final    nRBC 07/26/2024 0  /100 WBCs Final    Segmented % 07/26/2024 52  43 - 75 % Final    Immature Grans % 07/26/2024 0  0 - 2 % Final    Lymphocytes % 07/26/2024 31  14 - 44 % Final    Monocytes % 07/26/2024 7  4 - 12 % Final    Eosinophils Relative 07/26/2024 9 (H)  0 - 6 % Final    Basophils Relative 07/26/2024 1  0 - 1 % Final    Absolute Neutrophils 07/26/2024 3.33  1.85 - 7.62 Thousands/µL Final    Absolute Immature Grans 07/26/2024 0.02  0.00 - 0.20 Thousand/uL Final    Absolute Lymphocytes 07/26/2024 1.98  0.60 - 4.47 Thousands/µL Final    Absolute Monocytes 07/26/2024 0.42  0.17 - 1.22 Thousand/µL Final    Eosinophils Absolute 07/26/2024 0.56  0.00 - 0.61 Thousand/µL Final    Basophils Absolute 07/26/2024 0.08  0.00 - 0.10 Thousands/µL Final    CRP 07/26/2024 <1.0  <3.0 mg/L Final    Sodium 07/26/2024 140  135 - 147 mmol/L Final    Potassium 07/26/2024 4.4  3.5 - 5.3 mmol/L Final    Chloride 07/26/2024 105  96 - 108 mmol/L Final    CO2 07/26/2024 30  21 - 32 mmol/L Final    ANION GAP 07/26/2024 5  4 - 13 mmol/L Final    BUN 07/26/2024 11  5 - 25 mg/dL Final    Creatinine 07/26/2024 0.85  0.60 - 1.30 mg/dL Final    Standardized to IDMS reference method    Glucose, Fasting 07/26/2024 103 (H)  65 - 99 mg/dL Final    Calcium 07/26/2024 9.7  8.4 - 10.2 mg/dL Final    AST 07/26/2024 48 (H)  13 - 39 U/L Final    ALT 07/26/2024 63 (H)  7 - 52 U/L Final    Specimen collection should occur prior to Sulfasalazine administration due to the potential for falsely depressed results.     Alkaline Phosphatase 07/26/2024 89  34 - 104 U/L Final    Total Protein 07/26/2024 7.9  6.4 - 8.4 g/dL Final    Albumin 07/26/2024 4.5  3.5 - 5.0 g/dL Final    Total Bilirubin 07/26/2024 1.02 (H)  0.20 - 1.00 mg/dL Final    Use of this assay is not recommended for patients undergoing treatment with  eltrombopag due to the potential for falsely elevated results.  N-acetyl-p-benzoquinone imine (metabolite of Acetaminophen) will generate erroneously low results in samples for patients that have taken an overdose of Acetaminophen.    eGFR 07/26/2024 113  ml/min/1.73sq m Final    Iron Saturation 07/26/2024 7 (L)  15 - 50 % Final    TIBC 07/26/2024 406  250 - 450 ug/dL Final    Iron 07/26/2024 27 (L)  50 - 212 ug/dL Final    Patients treated with metal-binding drugs (ie. Deferoxamine) may have depressed iron values.    UIBC 07/26/2024 379 (H)  155 - 355 ug/dL Final    Ferritin 07/26/2024 83  24 - 336 ng/mL Final   Admission on 06/26/2024, Discharged on 06/29/2024   Component Date Value Ref Range Status    ABO Grouping 06/26/2024 A   Final    Rh Factor 06/26/2024 Positive   Final    Case Report 06/26/2024    Final                    Value:Surgical Pathology Report                         Case: [id number]                                  Authorizing Provider:  Jana Lugo, Collected:           06/26/2024 1755                                     MD                                                                           Ordering Location:     [facility]      Received:            06/26/2024 [facility]                                     Hospital Operating Room                                                      Pathologist:           Libby Menchaca MD                                                                    Specimen:    Colon, Ileocecectomy                                                                       Addendum 06/26/2024    Final                    Value:Immunostain for CMV on A6 is negative.      Final Diagnosis 06/26/2024    Final                    Value:A. 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  -   Negative for granuloma,  "dysplasia and malignancy.  -   Immunohistochemical stain on A6 for CMV is pending and will be reported in addendum.  -   Cecum with reactive lymphoid follicles; negative for colitis.  -   Appendix with acute appendicitis and acute serositis.  -   Proximal resection margin, negative for ileitis.  -   Distal resection margin with patchy acute colitis.  -   Fifteen benign lymph nodes.     Comment: The findings are consistent with the patient's known Crohn's disease.      Interpretation performed at [facility], [address] [place] [address]        Additional Information 06/26/2024    Final                    Value:All reported additional testing was performed with appropriately reactive controls.  These tests were developed and their performance characteristics determined by [facility] or appropriate performing facility, though some tests may be performed on tissues which have not been validated for performance characteristics (such as staining performed on alcohol exposed cell blocks and decalcified tissues).  Results should be interpreted with caution and in the context of the patients’ clinical condition. These tests may not be cleared or approved by the U.S. Food and Drug Administration, though the FDA has determined that such clearance or approval is not necessary. These tests are used for clinical purposes and they should not be regarded as investigational or for research. This laboratory has been approved by CLIA 88, designated as a high-complexity laboratory and is qualified to perform these tests.  .      Gross Description 06/26/2024    Final                    Value:A. The specimen is received in formalin, labeled with the patient's name and hospital number, and is designated \" ileocecectomy\".  It consists of an ileocecectomy specimen with opposing margins stapled together.  The ileum is 27.3 cm in length and 2.3-3.0 cm in diameter with attached 5.2 x 4.0 cm cecal pouch and 6.5 " x 0.7 cm appendix.  Adjacent to the staple line is a 1.6 cm anastomosis.  The serosa of the small bowel is tan to red-brown and smooth.  Sectioning reveals the mucosa is predominantly tan to red and smooth to ulcerated with a wall thickness of 0.5 to 1.4 cm.  There is a 3.7 x 2.2 x 2.0 cm outpouching of ileal wall consistent with diverticulum is located 5.3 cm from the ileal margin.  The cecal mucosa is tan with a normal folding pattern and a wall thickness of 0.3 cm.  The appendix serosa is tan-pink and smooth with a wall thickness of 0.2 to 0.4 cm.  Multiple potential lymph nodes ranging from 0.3 to 0.9 cm in greatest dimension are identified within the adherent                           mesenteric fat.  Representative sections are submitted as follows:   A1: Ileal margin  A2: Cecal margin  A3: Sections perpendicular to anastomosis  A4-A5: Section of diverticula, bisected  A6-A10: Ileum  A11: Appendix  A12: Cecum  A13: 2 potential lymph nodes, bisected (1 differentially inked black)  A14: 2 potential lymph nodes, bisected (1 differentially inked black)  A15: 2 potential lymph nodes, bisected (1 differentially inked black  A16: 4 potential lymph nodes, entirely submitted  A17: 5 potential lymph nodes, entirely submitted  Note: The estimated total formalin fixation time based upon information provided by the submitting clinician and the standard processing schedule is under 72 hours. [email or web address]       Clinical Information 06/26/2024    Final                    Value:Per op note,   Operative Indications:  Crohn's disease of both small and large intestine with abscess (HCC) [K50.814]     Operative Findings:  Large phlegmon and abscess cavity in right lower quadrant involving terminal ileum  Pericolonic inflammation at sigmoid colon from adjacent abscess  Primary side-to-side functional end-to-end anastomosis matured with 100 SHANTI stapler  Flexible sigmoidoscopy performed revealing healthy sigmoid colon and no bubbles on  leak test    WBC 06/27/2024 17.81 (H)  4.31 - 10.16 Thousand/uL Final    RBC 06/27/2024 4.28  3.88 - 5.62 Million/uL Final    Hemoglobin 06/27/2024 11.6 (L)  12.0 - 17.0 g/dL Final    Hematocrit 06/27/2024 35.9 (L)  36.5 - 49.3 % Final    MCV 06/27/2024 84  82 - 98 fL Final    MCH 06/27/2024 27.1  26.8 - 34.3 pg Final    MCHC 06/27/2024 32.3  31.4 - 37.4 g/dL Final    RDW 06/27/2024 15.3 (H)  11.6 - 15.1 % Final    MPV 06/27/2024 9.6  8.9 - 12.7 fL Final    Platelets 06/27/2024 308  149 - 390 Thousands/uL Final    nRBC 06/27/2024 0  /100 WBCs Final    Segmented % 06/27/2024 85 (H)  43 - 75 % Final    Immature Grans % 06/27/2024 1  0 - 2 % Final    Lymphocytes % 06/27/2024 7 (L)  14 - 44 % Final    Monocytes % 06/27/2024 7  4 - 12 % Final    Eosinophils Relative 06/27/2024 0  0 - 6 % Final    Basophils Relative 06/27/2024 0  0 - 1 % Final    Absolute Neutrophils 06/27/2024 15.25 (H)  1.85 - 7.62 Thousands/µL Final    Absolute Immature Grans 06/27/2024 0.11  0.00 - 0.20 Thousand/uL Final    Absolute Lymphocytes 06/27/2024 1.27  0.60 - 4.47 Thousands/µL Final    Absolute Monocytes 06/27/2024 1.15  0.17 - 1.22 Thousand/µL Final    Eosinophils Absolute 06/27/2024 0.00  0.00 - 0.61 Thousand/µL Final    Basophils Absolute 06/27/2024 0.03  0.00 - 0.10 Thousands/µL Final    Sodium 06/27/2024 136  135 - 147 mmol/L Final    Potassium 06/27/2024 4.2  3.5 - 5.3 mmol/L Final    Chloride 06/27/2024 103  96 - 108 mmol/L Final    CO2 06/27/2024 25  21 - 32 mmol/L Final    ANION GAP 06/27/2024 8  4 - 13 mmol/L Final    BUN 06/27/2024 6  5 - 25 mg/dL Final    Creatinine 06/27/2024 0.64  0.60 - 1.30 mg/dL Final    Standardized to IDMS reference method    Glucose 06/27/2024 111  65 - 140 mg/dL Final    If the patient is fasting, the ADA then defines impaired fasting glucose as > 100 mg/dL and diabetes as > or equal to 123 mg/dL.    Calcium 06/27/2024 8.9  8.4 - 10.2 mg/dL Final    eGFR 06/27/2024 127  ml/min/1.73sq m Final    Magnesium  06/27/2024 1.8 (L)  1.9 - 2.7 mg/dL Final    Phosphorus 06/27/2024 4.1  2.7 - 4.5 mg/dL Final    WBC 06/28/2024 10.88 (H)  4.31 - 10.16 Thousand/uL Final    RBC 06/28/2024 4.48  3.88 - 5.62 Million/uL Final    Hemoglobin 06/28/2024 12.0  12.0 - 17.0 g/dL Final    Hematocrit 06/28/2024 37.8  36.5 - 49.3 % Final    MCV 06/28/2024 84  82 - 98 fL Final    MCH 06/28/2024 26.8  26.8 - 34.3 pg Final    MCHC 06/28/2024 31.7  31.4 - 37.4 g/dL Final    RDW 06/28/2024 15.9 (H)  11.6 - 15.1 % Final    MPV 06/28/2024 9.6  8.9 - 12.7 fL Final    Platelets 06/28/2024 340  149 - 390 Thousands/uL Final    nRBC 06/28/2024 0  /100 WBCs Final    Segmented % 06/28/2024 77 (H)  43 - 75 % Final    Immature Grans % 06/28/2024 0  0 - 2 % Final    Lymphocytes % 06/28/2024 11 (L)  14 - 44 % Final    Monocytes % 06/28/2024 10  4 - 12 % Final    Eosinophils Relative 06/28/2024 2  0 - 6 % Final    Basophils Relative 06/28/2024 0  0 - 1 % Final    Absolute Neutrophils 06/28/2024 8.42 (H)  1.85 - 7.62 Thousands/µL Final    Absolute Immature Grans 06/28/2024 0.03  0.00 - 0.20 Thousand/uL Final    Absolute Lymphocytes 06/28/2024 1.19  0.60 - 4.47 Thousands/µL Final    Absolute Monocytes 06/28/2024 1.03  0.17 - 1.22 Thousand/µL Final    Eosinophils Absolute 06/28/2024 0.19  0.00 - 0.61 Thousand/µL Final    Basophils Absolute 06/28/2024 0.02  0.00 - 0.10 Thousands/µL Final    Sodium 06/28/2024 139  135 - 147 mmol/L Final    Potassium 06/28/2024 4.1  3.5 - 5.3 mmol/L Final    Chloride 06/28/2024 100  96 - 108 mmol/L Final    CO2 06/28/2024 30  21 - 32 mmol/L Final    ANION GAP 06/28/2024 9  4 - 13 mmol/L Final    BUN 06/28/2024 5  5 - 25 mg/dL Final    Creatinine 06/28/2024 0.67  0.60 - 1.30 mg/dL Final    Standardized to IDMS reference method    Glucose 06/28/2024 137  65 - 140 mg/dL Final    If the patient is fasting, the ADA then defines impaired fasting glucose as > 100 mg/dL and diabetes as > or equal to 123 mg/dL.    Calcium 06/28/2024 9.2  8.4 -  10.2 mg/dL Final    eGFR 06/28/2024 125  ml/min/1.73sq m Final    Sodium 06/29/2024 138  135 - 147 mmol/L Final    Potassium 06/29/2024 3.7  3.5 - 5.3 mmol/L Final    Chloride 06/29/2024 101  96 - 108 mmol/L Final    CO2 06/29/2024 27  21 - 32 mmol/L Final    ANION GAP 06/29/2024 10  4 - 13 mmol/L Final    BUN 06/29/2024 8  5 - 25 mg/dL Final    Creatinine 06/29/2024 0.69  0.60 - 1.30 mg/dL Final    Standardized to IDMS reference method    Glucose 06/29/2024 110  65 - 140 mg/dL Final    If the patient is fasting, the ADA then defines impaired fasting glucose as > 100 mg/dL and diabetes as > or equal to 123 mg/dL.    Calcium 06/29/2024 9.4  8.4 - 10.2 mg/dL Final    eGFR 06/29/2024 123  ml/min/1.73sq m Final    WBC 06/29/2024 11.32 (H)  4.31 - 10.16 Thousand/uL Final    RBC 06/29/2024 4.38  3.88 - 5.62 Million/uL Final    Hemoglobin 06/29/2024 11.9 (L)  12.0 - 17.0 g/dL Final    Hematocrit 06/29/2024 37.1  36.5 - 49.3 % Final    MCV 06/29/2024 85  82 - 98 fL Final    MCH 06/29/2024 27.2  26.8 - 34.3 pg Final    MCHC 06/29/2024 32.1  31.4 - 37.4 g/dL Final    RDW 06/29/2024 16.0 (H)  11.6 - 15.1 % Final    MPV 06/29/2024 9.7  8.9 - 12.7 fL Final    Platelets 06/29/2024 371  149 - 390 Thousands/uL Final    nRBC 06/29/2024 0  /100 WBCs Final    Segmented % 06/29/2024 63  43 - 75 % Final    Immature Grans % 06/29/2024 0  0 - 2 % Final    Lymphocytes % 06/29/2024 20  14 - 44 % Final    Monocytes % 06/29/2024 11  4 - 12 % Final    Eosinophils Relative 06/29/2024 6  0 - 6 % Final    Basophils Relative 06/29/2024 0  0 - 1 % Final    Absolute Neutrophils 06/29/2024 7.03  1.85 - 7.62 Thousands/µL Final    Absolute Immature Grans 06/29/2024 0.04  0.00 - 0.20 Thousand/uL Final    Absolute Lymphocytes 06/29/2024 2.31  0.60 - 4.47 Thousands/µL Final    Absolute Monocytes 06/29/2024 1.25 (H)  0.17 - 1.22 Thousand/µL Final    Eosinophils Absolute 06/29/2024 0.64 (H)  0.00 - 0.61 Thousand/µL Final    Basophils Absolute 06/29/2024  0.05  0.00 - 0.10 Thousands/µL Final    Ventricular Rate 06/28/2024 65  BPM Final    Atrial Rate 06/28/2024 65  BPM Final    OR Interval 06/28/2024 142  ms Final    QRSD Interval 06/28/2024 90  ms Final    QT Interval 06/28/2024 428  ms Final    QTC Interval 06/28/2024 445  ms Final    P Axis 06/28/2024 51  degrees Final    QRS Axis 06/28/2024 66  degrees Final    T Wave Clyde 06/28/2024 72  degrees Final   Lab Requisition on 06/18/2024   Component Date Value Ref Range Status    ABO Grouping 06/18/2024 A   Final    Rh Factor 06/18/2024 Positive   Final    Antibody Screen 06/18/2024 Negative   Final    Specimen Expiration Date 06/18/2024 20240716   Final   Appointment on 06/18/2024   Component Date Value Ref Range Status    VEDOLIZUMAB 06/18/2024 2.0  ug/mL Final    Comment:   Quantitation Limit: <1.3 ug/mL  Results of 1.3 or higher indicate detection of vedolizumab.    COMMENTS:       - The optimal drug concentration depends upon patient-         specific factors including the disease and desired         therapeutic endpoint.       - The following vedolizumab trough concentration         targets have been proposed:             > 30.0 ug/mL at week 2 (1)             > 24.0 ug/mL at week 6 (1)             > 14 ug/mL during maintenance (1)         - Mucosal healing in UC was more common in patients         with higher week 6 trough levels (>30).(2)       - Highest quartile week 6 levels (>35.8) and lowest         quartile (<17.2) corresponded to week 52 remission         rates of 37% and 15%, respectively.(3)       - Patients with Crohn's Disease and Ulcerative Colitis         had similar vedolizumab pharmacokinetic data.(4)       - This assay measures the antibody-unbound (free)         fraction of vedolizumab when serum anti-vedolizumab         antibodies                            are present.      ANTI-VEDOLIZUMAB ANTIBODY 06/18/2024 <25  ng/mL Final    Comment:   Quantitation Limit: < 25 ng/mL.  Results of 25 or  higher indicate detection of anti-  vedolizumab antibodies.    COMMENTS:       - Anti-vedolizumab antibodies developed in about 13%         of IBD patients.(5)       - Patients with persistently positive anti-vedolizumab         antibodies had undetectable or reduced vedolizumab         levels.(5)       - Anti-drug antibody positivity should be interpreted         in the context of the concomitant free drug level.       - Serial measurements over time may be helpful.       - This anti-vedolizumab antibody assay is drug         tolerant, and all positive results are verified for         anti-drug antibody specificity by a confirmatory         test.    References:  1. Mitch HARPER, et al. Clin Gastroenterol Hepatol 2018;16:     1937-46.  2. Yesica M, et al. Inflamm Bowel Dis 2014;20:S1-S3.  3. Jeffery BLACKWELL, et al. Aliment Pharmacol Ther 2019;49:     408-418.  4. Carlos YUSUF, et al. [employer] 2015;29:57-67.  5. [employer]                            [employer], [place]: [place]     prescribing information. Accessed 21 Nov 2016.    These tests were developed and their performance  characteristics determined by [facility]. They have not been  cleared or approved by the Food and Drug Administration.    However, these electrochemiluminescence immunoassay (ECLIA)  measurements of vedolizumab and anti-vedolizumab antibody  (constituting DoseASSURE VDZ) have been developed and  validated in accordance with FDA Guidance document, Assay  Development and Validation for Immunogenicity Testing of  Therapeutic Protein Products (2019).    CRP 06/18/2024 10.4 (H)  <3.0 mg/L Final    Sodium 06/18/2024 139  135 - 147 mmol/L Final    Potassium 06/18/2024 4.5  3.5 - 5.3 mmol/L Final    Chloride 06/18/2024 104  96 - 108 mmol/L Final    CO2 06/18/2024 30  21 - 32 mmol/L Final    ANION GAP 06/18/2024 5  4 - 13 mmol/L Final    BUN 06/18/2024 9  5 - 25 mg/dL Final    Creatinine 06/18/2024 0.87  0.60 - 1.30 mg/dL Final    Standardized to  IDMS reference method    Glucose 06/18/2024 110  65 - 140 mg/dL Final    If the patient is fasting, the ADA then defines impaired fasting glucose as > 100 mg/dL and diabetes as > or equal to 123 mg/dL.    Calcium 06/18/2024 9.7  8.4 - 10.2 mg/dL Final    AST 06/18/2024 17  13 - 39 U/L Final    ALT 06/18/2024 18  7 - 52 U/L Final    Specimen collection should occur prior to Sulfasalazine administration due to the potential for falsely depressed results.     Alkaline Phosphatase 06/18/2024 67  34 - 104 U/L Final    Total Protein 06/18/2024 7.5  6.4 - 8.4 g/dL Final    Albumin 06/18/2024 3.9  3.5 - 5.0 g/dL Final    Total Bilirubin 06/18/2024 0.88  0.20 - 1.00 mg/dL Final    Use of this assay is not recommended for patients undergoing treatment with eltrombopag due to the potential for falsely elevated results.  N-acetyl-p-benzoquinone imine (metabolite of Acetaminophen) will generate erroneously low results in samples for patients that have taken an overdose of Acetaminophen.    eGFR 06/18/2024 112  ml/min/1.73sq m Final    WBC 06/18/2024 15.68 (H)  4.31 - 10.16 Thousand/uL Final    RBC 06/18/2024 4.83  3.88 - 5.62 Million/uL Final    Hemoglobin 06/18/2024 12.5  12.0 - 17.0 g/dL Final    Hematocrit 06/18/2024 40.3  36.5 - 49.3 % Final    MCV 06/18/2024 83  82 - 98 fL Final    MCH 06/18/2024 25.9 (L)  26.8 - 34.3 pg Final    MCHC 06/18/2024 31.0 (L)  31.4 - 37.4 g/dL Final    RDW 06/18/2024 14.6  11.6 - 15.1 % Final    MPV 06/18/2024 8.7 (L)  8.9 - 12.7 fL Final    Platelets 06/18/2024 471 (H)  149 - 390 Thousands/uL Final    nRBC 06/18/2024 0  /100 WBCs Final    Segmented % 06/18/2024 74  43 - 75 % Final    Immature Grans % 06/18/2024 1  0 - 2 % Final    Lymphocytes % 06/18/2024 15  14 - 44 % Final    Monocytes % 06/18/2024 7  4 - 12 % Final    Eosinophils Relative 06/18/2024 2  0 - 6 % Final    Basophils Relative 06/18/2024 1  0 - 1 % Final    Absolute Neutrophils 06/18/2024 11.68 (H)  1.85 - 7.62 Thousands/µL Final     Absolute Immature Grans 06/18/2024 0.10  0.00 - 0.20 Thousand/uL Final    Absolute Lymphocytes 06/18/2024 2.35  0.60 - 4.47 Thousands/µL Final    Absolute Monocytes 06/18/2024 1.11  0.17 - 1.22 Thousand/µL Final    Eosinophils Absolute 06/18/2024 0.36  0.00 - 0.61 Thousand/µL Final    Basophils Absolute 06/18/2024 0.08  0.00 - 0.10 Thousands/µL Final    Hemoglobin A1C 06/18/2024 6.5 (H)  Normal 4.0-5.6%; PreDiabetic 5.7-6.4%; Diabetic >=6.5%; Glycemic control for adults with diabetes <7.0% % Final    EAG 06/18/2024 140  mg/dl Final   Appointment on 06/06/2024   Component Date Value Ref Range Status    VEDOLIZUMAB 06/06/2024 2.9  ug/mL Final    Comment:   Quantitation Limit: <1.3 ug/mL  Results of 1.3 or higher indicate detection of vedolizumab.    COMMENTS:       - The optimal drug concentration depends upon patient-         specific factors including the disease and desired         therapeutic endpoint.       - The following vedolizumab trough concentration         targets have been proposed:             > 30.0 ug/mL at week 2 (1)             > 24.0 ug/mL at week 6 (1)             > 14 ug/mL during maintenance (1)         - Mucosal healing in UC was more common in patients         with higher week 6 trough levels (>30).(2)       - Highest quartile week 6 levels (>35.8) and lowest         quartile (<17.2) corresponded to week 52 remission         rates of 37% and 15%, respectively.(3)       - Patients with Crohn's Disease and Ulcerative Colitis         had similar vedolizumab pharmacokinetic data.(4)       - This assay measures the antibody-unbound (free)         fraction of vedolizumab when serum anti-vedolizumab         antibodies                            are present.      ANTI-VEDOLIZUMAB ANTIBODY 06/06/2024 <25  ng/mL Final    Comment:   Quantitation Limit: < 25 ng/mL.  Results of 25 or higher indicate detection of anti-  vedolizumab antibodies.    COMMENTS:       - Anti-vedolizumab antibodies developed in  about 13%         of IBD patients.(5)       - Patients with persistently positive anti-vedolizumab         antibodies had undetectable or reduced vedolizumab         levels.(5)       - Anti-drug antibody positivity should be interpreted         in the context of the concomitant free drug level.       - Serial measurements over time may be helpful.       - This anti-vedolizumab antibody assay is drug         tolerant, and all positive results are verified for         anti-drug antibody specificity by a confirmatory         test.    References:  1. Mitch HARPER, et al. Clin Gastroenterol Hepatol 2018;16:     1937-46.  2. Yesica COE, et al. Inflamm Bowel Dis 2014;20:S1-S3.  3. Jeffery MT, et al. Aliment Pharmacol Ther 2019;49:     408-418.  4. Carlos YUSUF, et al. [employer]rugs 2015;29:57-67.  5. [employer]                            [employer], [place]: [place]     prescribing information. Accessed 21 Nov 2016.    These tests were developed and their performance  characteristics determined by [facility]. They have not been  cleared or approved by the Food and Drug Administration.    However, these electrochemiluminescence immunoassay (ECLIA)  measurements of vedolizumab and anti-vedolizumab antibody  (constituting DoseASSURE VDZ) have been developed and  validated in accordance with FDA Guidance document, Assay  Development and Validation for Immunogenicity Testing of  Therapeutic Protein Products (2019).    CRP 06/06/2024 192.7 (H)  <3.0 mg/L Final    Sodium 06/06/2024 135  135 - 147 mmol/L Final    Potassium 06/06/2024 4.0  3.5 - 5.3 mmol/L Final    Chloride 06/06/2024 98  96 - 108 mmol/L Final    CO2 06/06/2024 30  21 - 32 mmol/L Final    ANION GAP 06/06/2024 7  4 - 13 mmol/L Final    BUN 06/06/2024 10  5 - 25 mg/dL Final    Creatinine 06/06/2024 0.84  0.60 - 1.30 mg/dL Final    Standardized to IDMS reference method    Glucose 06/06/2024 104  65 - 140 mg/dL Final    If the patient is fasting, the ADA then defines  impaired fasting glucose as > 100 mg/dL and diabetes as > or equal to 123 mg/dL.    Calcium 06/06/2024 9.8  8.4 - 10.2 mg/dL Final    AST 06/06/2024 9 (L)  13 - 39 U/L Final    ALT 06/06/2024 5 (L)  7 - 52 U/L Final    Specimen collection should occur prior to Sulfasalazine administration due to the potential for falsely depressed results.     Alkaline Phosphatase 06/06/2024 70  34 - 104 U/L Final    Total Protein 06/06/2024 8.0  6.4 - 8.4 g/dL Final    Albumin 06/06/2024 3.9  3.5 - 5.0 g/dL Final    Total Bilirubin 06/06/2024 0.87  0.20 - 1.00 mg/dL Final    Use of this assay is not recommended for patients undergoing treatment with eltrombopag due to the potential for falsely elevated results.  N-acetyl-p-benzoquinone imine (metabolite of Acetaminophen) will generate erroneously low results in samples for patients that have taken an overdose of Acetaminophen.    eGFR 06/06/2024 114  ml/min/1.73sq m Final    WBC 06/06/2024 14.15 (H)  4.31 - 10.16 Thousand/uL Final    RBC 06/06/2024 4.54  3.88 - 5.62 Million/uL Final    Hemoglobin 06/06/2024 11.8 (L)  12.0 - 17.0 g/dL Final    Hematocrit 06/06/2024 37.6  36.5 - 49.3 % Final    MCV 06/06/2024 83  82 - 98 fL Final    MCH 06/06/2024 26.0 (L)  26.8 - 34.3 pg Final    MCHC 06/06/2024 31.4  31.4 - 37.4 g/dL Final    RDW 06/06/2024 13.1  11.6 - 15.1 % Final    MPV 06/06/2024 8.7 (L)  8.9 - 12.7 fL Final    Platelets 06/06/2024 555 (H)  149 - 390 Thousands/uL Final    nRBC 06/06/2024 0  /100 WBCs Final    Segmented % 06/06/2024 74  43 - 75 % Final    Immature Grans % 06/06/2024 1  0 - 2 % Final    Lymphocytes % 06/06/2024 13 (L)  14 - 44 % Final    Monocytes % 06/06/2024 9  4 - 12 % Final    Eosinophils Relative 06/06/2024 2  0 - 6 % Final    Basophils Relative 06/06/2024 1  0 - 1 % Final    Absolute Neutrophils 06/06/2024 10.60 (H)  1.85 - 7.62 Thousands/µL Final    Absolute Immature Grans 06/06/2024 0.10  0.00 - 0.20 Thousand/uL Final    Absolute Lymphocytes 06/06/2024  1.89  0.60 - 4.47 Thousands/µL Final    Absolute Monocytes 06/06/2024 1.20  0.17 - 1.22 Thousand/µL Final    Eosinophils Absolute 06/06/2024 0.28  0.00 - 0.61 Thousand/µL Final    Basophils Absolute 06/06/2024 0.08  0.00 - 0.10 Thousands/µL Final        No results found for this or any previous visit.     No results found for this or any previous visit.    No results found for this or any previous visit.    No results found for this or any previous visit.    No results found for this or any previous visit.    No results found for this or any previous visit.    No results found for this or any previous visit.    No results found for this or any previous visit.    No results found for this or any previous visit.    No results found for this or any previous visit.    No results found for this or any previous visit.    No results found for this or any previous visit.      Review of Systems   Constitutional:  Negative for appetite change, fatigue and fever.   HENT: Negative.  Negative for hearing loss, tinnitus, trouble swallowing and voice change.    Eyes:  Positive for visual disturbance (had 1 occurence of losing vision-saw bright white light). Negative for photophobia and pain.   Respiratory: Negative.  Negative for shortness of breath.    Cardiovascular: Negative.  Negative for palpitations.   Gastrointestinal: Negative.  Negative for nausea and vomiting.   Endocrine: Negative.  Negative for cold intolerance.   Genitourinary: Negative.  Negative for dysuria, frequency and urgency.   Musculoskeletal:  Negative for back pain, gait problem, myalgias, neck pain and neck stiffness.   Skin: Negative.  Negative for rash.   Allergic/Immunologic: Negative.    Neurological:  Positive for dizziness, weakness (hands b/l) and numbness (b/l hands since starting remicaide for chron's 1 week ago). Negative for tremors, seizures, syncope, facial asymmetry, speech difficulty, light-headedness and headaches.   Hematological: Negative.   Does not bruise/bleed easily.   Psychiatric/Behavioral: Negative.  Negative for confusion, hallucinations and sleep disturbance.          On examination,     Blood pressure 118/82, pulse 84, temperature 98.2 °F (36.8 °C), temperature source Temporal, weight 64.4 kg (142 lb), SpO2 97%.    Well developed, well nourished, in no acute distress    Normocephalic, atraumatic    Heart: regular rate and rhythm  I did not hear a carotid bruit    Extremities: no clubbing, cyanosis, or edema    Speech and cognition appeared normal    Cranial nerves:  II: Pupils equal, round, and reactive to light. No gross visual field defect. I did not appreciate optic disc edema.  III, IV, VI: Extraocular movements intact  V: Normal facial sensation in all three divisions of the trigeminal nerve bilaterally  VII: Normal facial strength  VIII: Hearing intact to finger rub bilaterally  IX, X: Palate elevated symmetrically  XI: Sternocleidomastoid strength normal bilaterally  XII: Tongue protruded in midline without atrophy or fibrillations    Motor:  Normal tone and bulk throughout.   Muscle strength testing by the MRC scale (listed below) was 5/5 in the deltoid, biceps, triceps, wrist extensors, wrist flexors, finger extensors, finger flexors, hip flexors, quadriceps, ankle dorsiflexors, ankle plantar flexors, and EHL bilaterally  MRC scale:  0/5 No contraction is present   1/5 Incomplete range of active motion, even when gravity is eliminated   2/5 Complete active range of motion with gravity eliminated   3/5 Full motion against gravity, but not with any resistance  4/5 The patient is able to complete the action against gravity and some resistance by the examiner   5/5 Completely normal strength, overcoming gravity and all resistance by the examiner     Deep tendon reflexes:   3+ and symmetrical in the biceps, triceps, brachioradialis, patellas, and ankles  Toes downgoing (no Babinski sign)  No Odom's sign    Sensation: Normal pinprick  and light touch throughout    Cerebellar: normal finger to nose and heel to shin testing    Gait: Normal             There are no Patient Instructions on file for this visit.      Thank you very much for allowing me to participate in your patient's care. Please feel free to contact me for any questions or concerns. Please be aware of the inherent limitations of voice recognition software, which may result in transcriptional errors.    Ryan Worthington MD

## 2024-11-04 NOTE — TELEPHONE ENCOUNTER
I called [facility], patient was added on to the call, pt scheduled today 11/4 for neurology consult.

## 2024-11-04 NOTE — ASSESSMENT & PLAN NOTE
Mr Hurtado developed bilateral hand tingling with lightheadedness and headache.  He now has occasional tingling that can involve either hand.  Given the temporal relationship to his first dose of Remicade, my main suspicion is that his medicines were a consequence.  In my experience I have not seen a single dose of Remicade because intracranial disease but he needs a cranial MRI to make sure there is no structural abnormality that might account for his symptoms.  Obviously, patients with 1 immune-mediated disease are at increased risk of developing another.  However, his neurologic examination is nonlocalizing which I told him is a good indicator from my standpoint.  He will call me after his MRI is completed.  Further measures would be considered based on the results and based on his clinical course.  If he does not have a cause that is seen then the next step would be to decide whether he can receive another dose of Remicade or whether he would need to try something else; this would be up to his gastroenterologist.  All of his questions were answered and he is in agreement with this plan.

## 2024-11-08 ENCOUNTER — TELEPHONE (OUTPATIENT)
Age: 34
End: 2024-11-08

## 2024-11-08 NOTE — TELEPHONE ENCOUNTER
Aruna with [facility] calling. States that she spoke with the patient and he was taken off inflectra and placed on Skyrizi.  They do not have an order for the skyrizi and will also have to start a prior auth.  They also need documentation of the reaction from inflectra faxed to them at this number-  [phone].    You can contact aruna with any questions her phone number and extension is listed.

## 2024-11-08 NOTE — TELEPHONE ENCOUNTER
Flaquita calling in from [facility] pharmacy, she reached out to pt and pt reported he was on skyrizi but she has him taking avsola. They did not receive any orders for skyrizi. Per notes in chart pt was being switched to skyrizi. Flaquita would like a call back to confirm. [phone] ext [phone]

## 2024-11-12 ENCOUNTER — TELEPHONE (OUTPATIENT)
Age: 34
End: 2024-11-12

## 2024-11-12 NOTE — TELEPHONE ENCOUNTER
Aruna from [facility] calling for script for Barbara.  Was supposed to be sent but has not received.    CB: [phone]  EXT [id number]

## 2024-11-16 ENCOUNTER — HOSPITAL ENCOUNTER (OUTPATIENT)
Dept: MRI IMAGING | Facility: HOSPITAL | Age: 34
Discharge: HOME | End: 2024-11-16
Attending: PSYCHIATRY & NEUROLOGY
Payer: COMMERCIAL

## 2024-11-16 DIAGNOSIS — R20.0 NUMBNESS: ICD-10-CM

## 2024-11-16 DIAGNOSIS — R20.0 BILATERAL HAND NUMBNESS: ICD-10-CM

## 2024-11-16 PROCEDURE — 70551 MRI BRAIN STEM W/O DYE: CPT

## 2024-11-19 ENCOUNTER — RESULTS FOLLOW-UP (OUTPATIENT)
Dept: NEUROLOGY | Facility: CLINIC | Age: 34
End: 2024-11-19

## 2024-12-06 ENCOUNTER — TELEPHONE (OUTPATIENT)
Age: 34
End: 2024-12-06

## 2024-12-06 ENCOUNTER — OFFICE VISIT (OUTPATIENT)
Age: 34
End: 2024-12-06
Payer: COMMERCIAL

## 2024-12-06 VITALS
HEART RATE: 66 BPM | HEIGHT: 68 IN | WEIGHT: 145 LBS | SYSTOLIC BLOOD PRESSURE: 92 MMHG | OXYGEN SATURATION: 99 % | TEMPERATURE: 98.5 F | BODY MASS INDEX: 21.98 KG/M2 | DIASTOLIC BLOOD PRESSURE: 62 MMHG

## 2024-12-06 DIAGNOSIS — D84.9 IMMUNOCOMPROMISED STATE (HCC): ICD-10-CM

## 2024-12-06 DIAGNOSIS — K50.814 CROHN'S DISEASE OF BOTH SMALL AND LARGE INTESTINE WITH ABSCESS (HCC): Primary | ICD-10-CM

## 2024-12-06 DIAGNOSIS — E61.1 IRON DEFICIENCY: ICD-10-CM

## 2024-12-06 DIAGNOSIS — E46 PROTEIN-CALORIE MALNUTRITION, UNSPECIFIED SEVERITY (HCC): ICD-10-CM

## 2024-12-06 DIAGNOSIS — E53.8 B12 DEFICIENCY: ICD-10-CM

## 2024-12-06 DIAGNOSIS — E55.9 VITAMIN D DEFICIENCY: ICD-10-CM

## 2024-12-06 DIAGNOSIS — R10.9 ABDOMINAL PAIN, UNSPECIFIED ABDOMINAL LOCATION: ICD-10-CM

## 2024-12-06 PROCEDURE — 99214 OFFICE O/P EST MOD 30 MIN: CPT | Performed by: INTERNAL MEDICINE

## 2024-12-06 RX ORDER — SODIUM CHLORIDE, SODIUM LACTATE, POTASSIUM CHLORIDE, CALCIUM CHLORIDE 600; 310; 30; 20 MG/100ML; MG/100ML; MG/100ML; MG/100ML
125 INJECTION, SOLUTION INTRAVENOUS CONTINUOUS
OUTPATIENT
Start: 2024-12-06

## 2024-12-06 NOTE — PROGRESS NOTES
Name: Victor Hugo Hurtado      : 1990      MRN: [id number]  Encounter Provider: Sam Freedman MD  Encounter Date: 2024   Encounter department: [facility]  :  Assessment & Plan  Crohn's disease of both small and large intestine with abscess (HCC)  The patient is a 34-year-old male with ileal Crohn's disease predominantly complicated by abscess and stricture diagnosed in 2023 treated with antibiotics, Entyvio, last seen by me in  and at that time noted to have disease progression despite Entyvio with new abscess and it was thought that he would likely benefit from surgical resection and postoperative biologic.  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  He was started on infliximab postoperatively but developed numbness and neurological changes that were thought to possibly be related to infliximab.  Also noted to have vision changes and neurologic changes / neuropathy. Overall now significantly better after his ileocecal resection.     Colonoscopy 2024 with stricture that was not traversable at the ileocecal valve, internal hemorrhoids; biopsies with inflammation noted and preserved crypt architecture.    MR enterography 2023 with active inflammatory small bowel Crohn's disease with luminal narrowing involving distal 15 cm of the ileum and stricture with imaging findings of active inflammation as well as inflammatory mass.  CT May 2024 with development of a right lower  pelvis intraloop abscess and persistent inflammatory changes involving a long segment of distal ileum mildly improved distally particularly at the level of the terminal ileum and improved proximal small bowel dilation and interval decrease in size of the inflammatory mass.    Prior vedolizumab level 2 with no antibodies in June 2024.  Labs from July notable for CMP with elevated AST, ALT, total bilirubin, glucose but otherwise normal.  Iron studies with low iron and iron saturation.  Vitamin D low at 27.9 and B12 lower end of normal at 260.  Most recent CBC with normal white blood cell count, hemoglobin, MCV, platelets.  Sed rate and CRP normal.  Hemoglobin A1c mildly elevated at 6.  Most recent calprotectin 58.5.      Continue to follow-up with surgery as needed  Next Quant gold and hepatitis panel ordered today and due January 2025  Repeat blood work ordered today including CBC, CMP, CRP, vitamin D, vitamin B12, iron studies    Continue Skyrizi  Follow-up with neurology    Low residue diet (avoid leafy green vegetables, raw fruits and vegetables especially with skin, nuts, seeds, corn/popcorn, high-fiber foods).  He can consider expanding diet in the setting of ileocecectomy  Drink at least 8 cups of water per day    Schedule postoperative colonoscopy  Obtain MR enterography in the future    Orders:    Colonoscopy; Future    CBC and differential; Future    Comprehensive metabolic panel; Future    C-reactive protein; Future    Vitamin D 25 hydroxy; Future    Vitamin B12; Future    Quantiferon TB Gold Plus Assay; Future    Chronic Hepatitis Panel; Future    Iron Panel (Includes Ferritin, Iron Sat%, Iron, and TIBC); Future    Immunocompromised state (HCC)  Avoid live virus vaccines  Yearly flu shot  COVID vaccine and booster  Pneumonia vaccine  Shingrix  Routine skin exams with the dermatologist    Orders:    CBC and differential; Future    Comprehensive metabolic panel; Future    C-reactive protein; Future     Vitamin D 25 hydroxy; Future    Vitamin B12; Future    Quantiferon TB Gold Plus Assay; Future    Chronic Hepatitis Panel; Future    Iron Panel (Includes Ferritin, Iron Sat%, Iron, and TIBC); Future    Protein-calorie malnutrition, unspecified severity (HCC)  Malnutrition Findings:                                 BMI Findings:           Body mass index is 22.05 kg/m².     High-protein and high-calorie diet  Recommend nutritional supplementation such as protein shakes       B12 deficiency  Continue to monitor  Continue supplementation  Orders:    CBC and differential; Future    Comprehensive metabolic panel; Future    C-reactive protein; Future    Vitamin D 25 hydroxy; Future    Vitamin B12; Future    Quantiferon TB Gold Plus Assay; Future    Chronic Hepatitis Panel; Future    Iron Panel (Includes Ferritin, Iron Sat%, Iron, and TIBC); Future    Vitamin D deficiency  Continue to monitor  Continue supplementation  Orders:    CBC and differential; Future    Comprehensive metabolic panel; Future    C-reactive protein; Future    Vitamin D 25 hydroxy; Future    Vitamin B12; Future    Quantiferon TB Gold Plus Assay; Future    Chronic Hepatitis Panel; Future    Iron Panel (Includes Ferritin, Iron Sat%, Iron, and TIBC); Future    Abdominal pain, unspecified abdominal location  As noted above under Crohn's plan  Particular focus on low residue diet with texture modification to help prevent potential episodes of obstruction which would likely contribute to abdominal pain -however, this should be improved now that he has had an ileocecectomy       Iron deficiency  Repeat iron levels  Orders:    CBC and differential; Future    Comprehensive metabolic panel; Future    C-reactive protein; Future    Vitamin D 25 hydroxy; Future    Vitamin B12; Future    Quantiferon TB Gold Plus Assay; Future    Chronic Hepatitis Panel; Future    Iron Panel (Includes Ferritin, Iron Sat%, Iron, and TIBC); Future        History of Present Illness  "  HPI  Victor Hugo Hurtado is a 34 y.o. male who presents fup of Crohns.     Had significant tingling, numbness and vision loss after first dose of infliximab. Subsequently saw opthalmogy, neurology. Vision change only happened for one day. Still continues to have tingling and numbness in bilateral hands.     First infusion of skyrizi this past Wednesday 12/4/24.    Since surgery abdominal symptoms are much better. 1-2 BM every day. No blood, urgency, nocturnal BM since surgery. Diet and weight has improved.         10 point ROS reviewed and negative, except as above         Objective   BP 92/62 (BP Location: Left arm, Patient Position: Sitting, Cuff Size: Adult)   Pulse 66   Temp 98.5 °F (36.9 °C) (Tympanic)   Ht 5' 8\" (1.727 m)   Wt 65.8 kg (145 lb)   SpO2 99%   BMI 22.05 kg/m²      PHYSICAL EXAMINATION:    General Appearance:   Alert, cooperative, no distress   HEENT:  Normocephalic, atraumatic, anicteric. Neck supple, symmetrical, trachea midline.   Lungs:   Equal chest rise and unlabored breathing, normal effort, no coughing.   Cardiovascular:   No visualized JVD.   Abdomen:   No abdominal distension.   Skin:   No jaundice, rashes, or lesions.    Musculoskeletal:   Normal range of motion visualized.   Psych:  Normal affect and normal insight.   Neuro:  Alert and appropriate.           "

## 2024-12-06 NOTE — TELEPHONE ENCOUNTER
Procedure: Colonoscopy  Date: 03/18/2025  Physician performing: Dr. Freedman  Location of procedure:  [facility]  Instructions given to patient: Miralax  Diabetic: No  Clearances: N/A

## 2025-01-13 ENCOUNTER — OFFICE VISIT (OUTPATIENT)
Dept: URGENT CARE | Facility: CLINIC | Age: 35
End: 2025-01-13
Payer: COMMERCIAL

## 2025-01-13 VITALS
RESPIRATION RATE: 16 BRPM | OXYGEN SATURATION: 97 % | HEART RATE: 94 BPM | SYSTOLIC BLOOD PRESSURE: 134 MMHG | DIASTOLIC BLOOD PRESSURE: 70 MMHG | TEMPERATURE: 100.9 F

## 2025-01-13 DIAGNOSIS — R68.89 FLU-LIKE SYMPTOMS: Primary | ICD-10-CM

## 2025-01-13 DIAGNOSIS — R68.83 CHILLS: ICD-10-CM

## 2025-01-13 LAB
SARS-COV-2 AG UPPER RESP QL IA: NEGATIVE
VALID CONTROL: NORMAL

## 2025-01-13 PROCEDURE — 99213 OFFICE O/P EST LOW 20 MIN: CPT

## 2025-01-13 PROCEDURE — 87636 SARSCOV2 & INF A&B AMP PRB: CPT

## 2025-01-13 RX ORDER — OSELTAMIVIR PHOSPHATE 75 MG/1
75 CAPSULE ORAL EVERY 12 HOURS SCHEDULED
Qty: 10 CAPSULE | Refills: 0 | Status: SHIPPED | OUTPATIENT
Start: 2025-01-13 | End: 2025-01-18

## 2025-01-14 LAB
FLUAV RNA RESP QL NAA+PROBE: POSITIVE
FLUBV RNA RESP QL NAA+PROBE: NEGATIVE
SARS-COV-2 RNA RESP QL NAA+PROBE: NEGATIVE

## 2025-01-14 NOTE — PATIENT INSTRUCTIONS
Rapid Covid test negative.    You have a Covid/Flu PCR test pending. You can download [email or web address] for the results which take approximately 24-48 hours. You will be notified if positive.      For nasal/sinus congestion you can try steam, warm compresses, saline nasal spray, Neti pot, nasal steroid (Flonase, Nasocort), or nasal decongestant (Afrin - for 3 days only).    You can try a decongestant (Sudafed) if > 6 years of age and no history of high blood pressure.    For cough you can take an over-the-counter expectorant such as plain Robitussion or Mucinex. A spoonful of honey at bedtime may also be helpful.    For cold symptoms with high blood pressure take Coricidin cough/cold.     For sore throat you can use Cepacol lozenges, do warm salt water gargles, drink warm water with lemon or herbal teas, or use an over-the-counter throat spray (Chloraseptic).    You can take ibuprofen/Motrin and acetaminophen/Tylenol as needed for pain, fever, body aches. Do not take ibuprofen/Motrin/Advil if you have a history of heart disease, bleeding ulcers, or if you take blood thinners.     Drink plenty of fluids to stay hydrated. Airborne or Emergen-C for extra vitamin C and zinc.    Follow up with your PCP in 3-5 days for persistent symptoms.    Go to the ER if symptoms worsen.

## 2025-01-14 NOTE — PROGRESS NOTES
[facility]        NAME: Victor Hugo Hurtado is a 34 y.o. male  : 1990    MRN: [id number]  DATE: 2025  TIME: 7:13 PM    Assessment and Plan   Flu-like symptoms [R68.89]  1. Flu-like symptoms  oseltamivir (TAMIFLU) 75 mg capsule      2. Chills  Poct Covid 19 Rapid Antigen Test            Patient Instructions     Rapid Covid test negative.    You have a Covid/Flu PCR test pending. You can download [facility] for the results which take approximately 24-48 hours. You will be notified if positive.      For nasal/sinus congestion you can try steam, warm compresses, saline nasal spray, Neti pot, nasal steroid (Flonase, Nasocort), or nasal decongestant (Afrin - for 3 days only).    You can try a decongestant (Sudafed) if > 6 years of age and no history of high blood pressure.    For cough you can take an over-the-counter expectorant such as plain Robitussion or Mucinex. A spoonful of honey at bedtime may also be helpful.    For cold symptoms with high blood pressure take Coricidin cough/cold.     For sore throat you can use Cepacol lozenges, do warm salt water gargles, drink warm water with lemon or herbal teas, or use an over-the-counter throat spray (Chloraseptic).    You can take ibuprofen/Motrin and acetaminophen/Tylenol as needed for pain, fever, body aches. Do not take ibuprofen/Motrin/Advil if you have a history of heart disease, bleeding ulcers, or if you take blood thinners.     Drink plenty of fluids to stay hydrated. Airborne or Emergen-C for extra vitamin C and zinc.    Follow up with your PCP in 3-5 days for persistent symptoms.    Go to the ER if symptoms worsen.     If tests are performed, our office will contact you with results only if changes need to made to the care plan discussed with you at the visit. You can review your full results on [facility].      Chief Complaint     Chief Complaint   Patient presents with    Generalized Body Aches     Patient  with body aches, sore throat, and chest congestion x1. Patient also with fatigue.          History of Present Illness       34-year-old male presenting with 1-day history of subjective fevers, chills, body aches, congestion, sore throat, and cough. Known exposure to influenza.        Review of Systems   Review of Systems   Constitutional:  Positive for chills, fatigue and fever.   HENT:  Positive for congestion and sore throat. Negative for ear pain and sinus pressure.    Eyes:  Negative for discharge and redness.   Respiratory:  Positive for cough. Negative for shortness of breath and wheezing.    Cardiovascular:  Negative for chest pain and palpitations.   Gastrointestinal:  Negative for abdominal pain, diarrhea, nausea and vomiting.   Skin:  Negative for rash.   Neurological:  Positive for headaches. Negative for dizziness and light-headedness.         Current Medications       Current Outpatient Medications:     oseltamivir (TAMIFLU) 75 mg capsule, Take 1 capsule (75 mg total) by mouth every 12 (twelve) hours for 5 days, Disp: 10 capsule, Rfl: 0    Azelastine HCl 137 MCG/SPRAY SOLN, INSERT 1 TO 2 SPRAYS INTO EACH NOSTRIL TWICE A DAY AS NEEDED FOR RHINITIS. USE IN EACH NOSTRIL AS DIRECTED., Disp: 90 mL, Rfl: 1    Cholecalciferol (Vitamin D) 125 MCG (5000 UT) CAPS, Take 1 capsule by mouth in the morning, Disp: 90 capsule, Rfl: 3    vitamin B-12 (VITAMIN B-12) 1,000 mcg tablet, Take 1 tablet (1,000 mcg total) by mouth daily, Disp: 90 tablet, Rfl: 3    Current Allergies     Allergies as of 01/13/2025 - Reviewed 01/13/2025   Allergen Reaction Noted    Ammonia Nasal Congestion 10/02/2021    Molds & smuts Other (See Comments) 10/18/2024            The following portions of the patient's history were reviewed and updated as appropriate: allergies, current medications, past family history, past medical history, past social history, past surgical history and problem list.     Past Medical History:   Diagnosis Date     Crohn's colitis (HCC)        Past Surgical History:   Procedure Laterality Date    COLONOSCOPY      TN LAPAROSCOPY COLECTOMY PARTIAL W/ANASTOMOSIS N/A 6/26/2024    Procedure: HAND-ASSISTED LAPAROSCOPIC ILEOCECETOMY;  Surgeon: Jana Lugo MD;  [facility];  Service: Colorectal    TN SIGMOIDOSCOPY FLX DX W/COLLJ SPEC BR/WA IF PFRMD N/A 6/26/2024    Procedure: SIGMOIDOSCOPY FLEXIBLE;  Surgeon: Jana Lugo MD;  [facility]: [facility];  Service: Colorectal       Family History   Problem Relation Age of Onset    No Known Problems Mother     No Known Problems Father     Colon cancer Neg Hx     Colon polyps Neg Hx          Medications have been verified.        Objective   /70   Pulse 94   Temp (!) 100.9 °F (38.3 °C)   Resp 16   SpO2 97%        Physical Exam     Physical Exam  Vitals and nursing note reviewed.   Constitutional:       General: He is not in acute distress.     Appearance: He is not ill-appearing or diaphoretic.   HENT:      Head: Normocephalic and atraumatic.      Right Ear: Tympanic membrane, ear canal and external ear normal.      Left Ear: Tympanic membrane, ear canal and external ear normal.      Nose: Nose normal.      Mouth/Throat:      Mouth: Mucous membranes are moist.      Pharynx: Oropharynx is clear.   Eyes:      Conjunctiva/sclera: Conjunctivae normal.   Cardiovascular:      Rate and Rhythm: Normal rate and regular rhythm.   Pulmonary:      Effort: Pulmonary effort is normal.      Breath sounds: Normal breath sounds.   Musculoskeletal:         General: Normal range of motion.      Cervical back: Normal range of motion and neck supple.   Skin:     General: Skin is warm and dry.      Capillary Refill: Capillary refill takes less than 2 seconds.   Neurological:      Mental Status: He is alert and oriented to person, place, and time.

## 2025-01-17 DIAGNOSIS — K50.814 CROHN'S DISEASE OF BOTH SMALL AND LARGE INTESTINE WITH ABSCESS (HCC): Primary | ICD-10-CM

## 2025-01-17 RX ORDER — RISANKIZUMAB-RZAA 360 MG/2.4
WEARABLE INJECTOR SUBCUTANEOUS
Qty: 2.4 ML | Refills: 6 | Status: SHIPPED | OUTPATIENT
Start: 2025-01-17

## 2025-01-21 ENCOUNTER — TELEPHONE (OUTPATIENT)
Age: 35
End: 2025-01-21

## 2025-01-21 NOTE — TELEPHONE ENCOUNTER
Patient calling regarding Skyrizi injections wanting to  make sure we had the correct insurance / Explained we have [employer] on file / Patient stated that's correct.

## 2025-01-22 ENCOUNTER — TELEPHONE (OUTPATIENT)
Dept: GASTROENTEROLOGY | Facility: CLINIC | Age: 35
End: 2025-01-22

## 2025-02-04 DIAGNOSIS — K50.014 CROHN'S DISEASE OF SMALL INTESTINE WITH ABSCESS (HCC): Primary | ICD-10-CM

## 2025-02-04 RX ORDER — METHYLPREDNISOLONE SODIUM SUCCINATE 40 MG/ML
40 INJECTION, POWDER, LYOPHILIZED, FOR SOLUTION INTRAMUSCULAR; INTRAVENOUS ONCE
Status: CANCELLED | OUTPATIENT
Start: 2025-02-06

## 2025-02-04 RX ORDER — DEXTROSE MONOHYDRATE 50 MG/ML
20 INJECTION, SOLUTION INTRAVENOUS ONCE
Status: CANCELLED | OUTPATIENT
Start: 2025-02-06

## 2025-02-04 RX ORDER — ACETAMINOPHEN 325 MG/1
650 TABLET ORAL ONCE
Status: CANCELLED | OUTPATIENT
Start: 2025-02-06

## 2025-02-04 RX ORDER — DIPHENHYDRAMINE HCL 25 MG
25 TABLET ORAL ONCE
Status: CANCELLED | OUTPATIENT
Start: 2025-02-06

## 2025-02-05 DIAGNOSIS — K50.014 CROHN'S DISEASE OF SMALL INTESTINE WITH ABSCESS (HCC): Primary | ICD-10-CM

## 2025-02-05 RX ORDER — RISANKIZUMAB-RZAA 360 MG/2.4
WEARABLE INJECTOR SUBCUTANEOUS
Qty: 2.4 ML | Refills: 6 | Status: SHIPPED | OUTPATIENT
Start: 2025-02-05

## 2025-02-06 ENCOUNTER — APPOINTMENT (OUTPATIENT)
Dept: LAB | Facility: HOSPITAL | Age: 35
End: 2025-02-06
Payer: COMMERCIAL

## 2025-02-06 ENCOUNTER — RESULTS FOLLOW-UP (OUTPATIENT)
Age: 35
End: 2025-02-06

## 2025-02-06 ENCOUNTER — HOSPITAL ENCOUNTER (OUTPATIENT)
Dept: INFUSION CENTER | Facility: HOSPITAL | Age: 35
Discharge: HOME | End: 2025-02-06
Attending: INTERNAL MEDICINE
Payer: COMMERCIAL

## 2025-02-06 VITALS
WEIGHT: 143.3 LBS | RESPIRATION RATE: 18 BRPM | TEMPERATURE: 97.3 F | HEIGHT: 68 IN | BODY MASS INDEX: 21.72 KG/M2 | SYSTOLIC BLOOD PRESSURE: 121 MMHG | HEART RATE: 83 BPM | OXYGEN SATURATION: 100 % | DIASTOLIC BLOOD PRESSURE: 72 MMHG

## 2025-02-06 DIAGNOSIS — K50.014 CROHN'S DISEASE OF SMALL INTESTINE WITH ABSCESS (HCC): ICD-10-CM

## 2025-02-06 DIAGNOSIS — E53.8 VITAMIN B12 DEFICIENCY: ICD-10-CM

## 2025-02-06 DIAGNOSIS — D84.9 IMMUNOCOMPROMISED STATE (HCC): ICD-10-CM

## 2025-02-06 DIAGNOSIS — E53.8 B12 DEFICIENCY: ICD-10-CM

## 2025-02-06 DIAGNOSIS — K50.814 CROHN'S DISEASE OF BOTH SMALL AND LARGE INTESTINE WITH ABSCESS (HCC): ICD-10-CM

## 2025-02-06 DIAGNOSIS — E61.1 IRON DEFICIENCY: Primary | ICD-10-CM

## 2025-02-06 DIAGNOSIS — E61.1 IRON DEFICIENCY: ICD-10-CM

## 2025-02-06 DIAGNOSIS — K50.014 CROHN'S DISEASE OF SMALL INTESTINE WITH ABSCESS (HCC): Primary | ICD-10-CM

## 2025-02-06 DIAGNOSIS — E55.9 VITAMIN D DEFICIENCY: ICD-10-CM

## 2025-02-06 LAB
25(OH)D3 SERPL-MCNC: 17.3 NG/ML (ref 30–100)
ALBUMIN SERPL BCG-MCNC: 4.6 G/DL (ref 3.5–5)
ALP SERPL-CCNC: 74 U/L (ref 34–104)
ALT SERPL W P-5'-P-CCNC: 18 U/L (ref 7–52)
ANION GAP SERPL CALCULATED.3IONS-SCNC: 6 MMOL/L (ref 4–13)
AST SERPL W P-5'-P-CCNC: 18 U/L (ref 13–39)
BASOPHILS # BLD AUTO: 0.07 THOUSANDS/ÂΜL (ref 0–0.1)
BASOPHILS NFR BLD AUTO: 1 % (ref 0–1)
BILIRUB SERPL-MCNC: 1.62 MG/DL (ref 0.2–1)
BUN SERPL-MCNC: 14 MG/DL (ref 5–25)
CALCIUM SERPL-MCNC: 9.6 MG/DL (ref 8.4–10.2)
CHLORIDE SERPL-SCNC: 104 MMOL/L (ref 96–108)
CO2 SERPL-SCNC: 29 MMOL/L (ref 21–32)
CREAT SERPL-MCNC: 0.92 MG/DL (ref 0.6–1.3)
CRP SERPL QL: 11.5 MG/L
EOSINOPHIL # BLD AUTO: 0.47 THOUSAND/ÂΜL (ref 0–0.61)
EOSINOPHIL NFR BLD AUTO: 5 % (ref 0–6)
ERYTHROCYTE [DISTWIDTH] IN BLOOD BY AUTOMATED COUNT: 12.8 % (ref 11.6–15.1)
FERRITIN SERPL-MCNC: 61 NG/ML (ref 24–336)
GFR SERPL CREATININE-BSD FRML MDRD: 107 ML/MIN/1.73SQ M
GLUCOSE SERPL-MCNC: 127 MG/DL (ref 65–140)
HCT VFR BLD AUTO: 42.7 % (ref 36.5–49.3)
HGB BLD-MCNC: 14.5 G/DL (ref 12–17)
IMM GRANULOCYTES # BLD AUTO: 0.02 THOUSAND/UL (ref 0–0.2)
IMM GRANULOCYTES NFR BLD AUTO: 0 % (ref 0–2)
IRON SATN MFR SERPL: 12 % (ref 15–50)
IRON SERPL-MCNC: 53 UG/DL (ref 50–212)
LYMPHOCYTES # BLD AUTO: 1.95 THOUSANDS/ÂΜL (ref 0.6–4.47)
LYMPHOCYTES NFR BLD AUTO: 20 % (ref 14–44)
MCH RBC QN AUTO: 30 PG (ref 26.8–34.3)
MCHC RBC AUTO-ENTMCNC: 34 G/DL (ref 31.4–37.4)
MCV RBC AUTO: 88 FL (ref 82–98)
MONOCYTES # BLD AUTO: 0.86 THOUSAND/ÂΜL (ref 0.17–1.22)
MONOCYTES NFR BLD AUTO: 9 % (ref 4–12)
NEUTROPHILS # BLD AUTO: 6.18 THOUSANDS/ÂΜL (ref 1.85–7.62)
NEUTS SEG NFR BLD AUTO: 65 % (ref 43–75)
NRBC BLD AUTO-RTO: 0 /100 WBCS
PLATELET # BLD AUTO: 257 THOUSANDS/UL (ref 149–390)
PMV BLD AUTO: 9.7 FL (ref 8.9–12.7)
POTASSIUM SERPL-SCNC: 4.4 MMOL/L (ref 3.5–5.3)
PROT SERPL-MCNC: 7.8 G/DL (ref 6.4–8.4)
RBC # BLD AUTO: 4.84 MILLION/UL (ref 3.88–5.62)
SODIUM SERPL-SCNC: 139 MMOL/L (ref 135–147)
TIBC SERPL-MCNC: 432.6 UG/DL (ref 250–450)
TRANSFERRIN SERPL-MCNC: 309 MG/DL (ref 203–362)
UIBC SERPL-MCNC: 380 UG/DL (ref 155–355)
VIT B12 SERPL-MCNC: 173 PG/ML (ref 180–914)
WBC # BLD AUTO: 9.55 THOUSAND/UL (ref 4.31–10.16)

## 2025-02-06 PROCEDURE — 86480 TB TEST CELL IMMUN MEASURE: CPT

## 2025-02-06 PROCEDURE — 86705 HEP B CORE ANTIBODY IGM: CPT

## 2025-02-06 PROCEDURE — 82306 VITAMIN D 25 HYDROXY: CPT

## 2025-02-06 PROCEDURE — 96365 THER/PROPH/DIAG IV INF INIT: CPT

## 2025-02-06 PROCEDURE — 83550 IRON BINDING TEST: CPT

## 2025-02-06 PROCEDURE — 86803 HEPATITIS C AB TEST: CPT

## 2025-02-06 PROCEDURE — 86704 HEP B CORE ANTIBODY TOTAL: CPT

## 2025-02-06 PROCEDURE — 83540 ASSAY OF IRON: CPT

## 2025-02-06 PROCEDURE — 80053 COMPREHEN METABOLIC PANEL: CPT

## 2025-02-06 PROCEDURE — 85025 COMPLETE CBC W/AUTO DIFF WBC: CPT

## 2025-02-06 PROCEDURE — 82728 ASSAY OF FERRITIN: CPT

## 2025-02-06 PROCEDURE — 82607 VITAMIN B-12: CPT

## 2025-02-06 PROCEDURE — 36415 COLL VENOUS BLD VENIPUNCTURE: CPT

## 2025-02-06 PROCEDURE — 86140 C-REACTIVE PROTEIN: CPT

## 2025-02-06 PROCEDURE — 87340 HEPATITIS B SURFACE AG IA: CPT

## 2025-02-06 RX ORDER — DEXTROSE MONOHYDRATE 50 MG/ML
20 INJECTION, SOLUTION INTRAVENOUS ONCE
Status: COMPLETED | OUTPATIENT
Start: 2025-02-06 | End: 2025-02-06

## 2025-02-06 RX ORDER — DIPHENHYDRAMINE HCL 25 MG
25 TABLET ORAL ONCE
Status: CANCELLED | OUTPATIENT
Start: 2025-02-06

## 2025-02-06 RX ORDER — DEXTROSE MONOHYDRATE 50 MG/ML
20 INJECTION, SOLUTION INTRAVENOUS ONCE
Status: CANCELLED | OUTPATIENT
Start: 2025-02-06

## 2025-02-06 RX ORDER — METHYLPREDNISOLONE SODIUM SUCCINATE 40 MG/ML
40 INJECTION, POWDER, LYOPHILIZED, FOR SOLUTION INTRAMUSCULAR; INTRAVENOUS ONCE
Status: CANCELLED | OUTPATIENT
Start: 2025-02-06

## 2025-02-06 RX ORDER — DIPHENHYDRAMINE HCL 25 MG
25 TABLET ORAL ONCE
Status: DISCONTINUED | OUTPATIENT
Start: 2025-02-06 | End: 2025-02-09 | Stop reason: HOSPADM

## 2025-02-06 RX ORDER — METHYLPREDNISOLONE SODIUM SUCCINATE 40 MG/ML
40 INJECTION, POWDER, LYOPHILIZED, FOR SOLUTION INTRAMUSCULAR; INTRAVENOUS ONCE
Status: DISCONTINUED | OUTPATIENT
Start: 2025-02-06 | End: 2025-02-09 | Stop reason: HOSPADM

## 2025-02-06 RX ORDER — ACETAMINOPHEN 325 MG/1
650 TABLET ORAL ONCE
Status: CANCELLED | OUTPATIENT
Start: 2025-02-06

## 2025-02-06 RX ORDER — ACETAMINOPHEN 325 MG/1
650 TABLET ORAL ONCE
Status: DISCONTINUED | OUTPATIENT
Start: 2025-02-06 | End: 2025-02-09 | Stop reason: HOSPADM

## 2025-02-06 RX ADMIN — DEXTROSE 20 ML/HR: 50 INJECTION, SOLUTION INTRAVENOUS at 13:12

## 2025-02-06 RX ADMIN — DEXTROSE 600 MG: 5 SOLUTION INTRAVENOUS at 13:36

## 2025-02-06 NOTE — PROGRESS NOTES
Victor Hugo Hurtado  tolerated treatment well with no complications.      Victor Hugo Hurtado has no future appointments scheduled with infusion center as pt. Is transitioning to home injections. Pt. Has office f/u scheduled for 06/17/2025 at 08:30 AM.    AVS printed and given to Victor Hugo Hurtado:  No (Declined by Victor Hugo Hurtado)

## 2025-02-06 NOTE — RESULT ENCOUNTER NOTE
Hi,    Can we check how many Skyrizi infusions he has received? All I see is 1 but his ordered have no more. Not sure if this was entered wrong by whomever ordered it.     Thank you

## 2025-02-07 LAB
GAMMA INTERFERON BACKGROUND BLD IA-ACNC: 0.01 IU/ML
HBV CORE AB SER QL: NORMAL
HBV CORE IGM SER QL: NORMAL
HBV SURFACE AG SER QL: NORMAL
HCV AB SER QL: NORMAL
M TB IFN-G BLD-IMP: NEGATIVE
M TB IFN-G CD4+ BCKGRND COR BLD-ACNC: 0 IU/ML
M TB IFN-G CD4+ BCKGRND COR BLD-ACNC: 0.01 IU/ML
MITOGEN IGNF BCKGRD COR BLD-ACNC: 9.99 IU/ML

## 2025-02-11 PROBLEM — E53.8 VITAMIN B12 DEFICIENCY: Status: ACTIVE | Noted: 2025-02-11

## 2025-02-11 PROBLEM — E61.1 IRON DEFICIENCY: Status: ACTIVE | Noted: 2025-02-11

## 2025-02-11 RX ORDER — CYANOCOBALAMIN 1000 UG/ML
1000 INJECTION, SOLUTION INTRAMUSCULAR; SUBCUTANEOUS ONCE
OUTPATIENT
Start: 2025-02-17 | End: 2025-02-17

## 2025-02-11 RX ORDER — SODIUM CHLORIDE 9 MG/ML
20 INJECTION, SOLUTION INTRAVENOUS ONCE
OUTPATIENT
Start: 2025-02-17

## 2025-02-11 RX ORDER — ERGOCALCIFEROL 1.25 MG/1
50000 CAPSULE ORAL WEEKLY
Qty: 16 CAPSULE | Refills: 0 | Status: SHIPPED | OUTPATIENT
Start: 2025-02-11 | End: 2025-05-28

## 2025-02-11 NOTE — TELEPHONE ENCOUNTER
----- Message from Lorraine FRAUSTO sent at 2/11/2025 11:48 AM EST -----  Spoke with the patient and he has agreed to all of the recommendations mentioned. I also informed him someone would reach out to him on scheduling the iron infusions.

## 2025-03-03 ENCOUNTER — TELEPHONE (OUTPATIENT)
Age: 35
End: 2025-03-03

## 2025-03-03 NOTE — TELEPHONE ENCOUNTER
Spoke with pt and confirming upcoming procedure with Dr. Freedman on 03/18, Pt does not have any questions about the prep.

## 2025-03-14 ENCOUNTER — ANESTHESIA EVENT (OUTPATIENT)
Dept: ANESTHESIOLOGY | Facility: HOSPITAL | Age: 35
End: 2025-03-14

## 2025-03-14 ENCOUNTER — ANESTHESIA (OUTPATIENT)
Dept: ANESTHESIOLOGY | Facility: HOSPITAL | Age: 35
End: 2025-03-14

## 2025-03-18 ENCOUNTER — ANESTHESIA (OUTPATIENT)
Dept: GASTROENTEROLOGY | Facility: MEDICAL CENTER | Age: 35
End: 2025-03-18
Payer: COMMERCIAL

## 2025-03-18 ENCOUNTER — ANESTHESIA EVENT (OUTPATIENT)
Dept: GASTROENTEROLOGY | Facility: MEDICAL CENTER | Age: 35
End: 2025-03-18
Payer: COMMERCIAL

## 2025-03-18 ENCOUNTER — HOSPITAL ENCOUNTER (OUTPATIENT)
Dept: GASTROENTEROLOGY | Facility: MEDICAL CENTER | Age: 35
Setting detail: OUTPATIENT SURGERY
Discharge: HOME | End: 2025-03-18
Attending: INTERNAL MEDICINE
Payer: COMMERCIAL

## 2025-03-18 VITALS
OXYGEN SATURATION: 100 % | HEART RATE: 74 BPM | BODY MASS INDEX: 22.96 KG/M2 | HEIGHT: 69 IN | TEMPERATURE: 97.6 F | RESPIRATION RATE: 19 BRPM | WEIGHT: 155 LBS | SYSTOLIC BLOOD PRESSURE: 118 MMHG | DIASTOLIC BLOOD PRESSURE: 74 MMHG

## 2025-03-18 DIAGNOSIS — K50.814 CROHN'S DISEASE OF BOTH SMALL AND LARGE INTESTINE WITH ABSCESS (HCC): ICD-10-CM

## 2025-03-18 PROCEDURE — 88342 IMHCHEM/IMCYTCHM 1ST ANTB: CPT | Performed by: PATHOLOGY

## 2025-03-18 PROCEDURE — 88341 IMHCHEM/IMCYTCHM EA ADD ANTB: CPT | Performed by: PATHOLOGY

## 2025-03-18 PROCEDURE — 88305 TISSUE EXAM BY PATHOLOGIST: CPT | Performed by: PATHOLOGY

## 2025-03-18 RX ORDER — SODIUM CHLORIDE, SODIUM LACTATE, POTASSIUM CHLORIDE, CALCIUM CHLORIDE 600; 310; 30; 20 MG/100ML; MG/100ML; MG/100ML; MG/100ML
125 INJECTION, SOLUTION INTRAVENOUS CONTINUOUS
Status: DISCONTINUED | OUTPATIENT
Start: 2025-03-18 | End: 2025-03-22 | Stop reason: HOSPADM

## 2025-03-18 RX ORDER — PROPOFOL 10 MG/ML
INJECTION, EMULSION INTRAVENOUS AS NEEDED
Status: DISCONTINUED | OUTPATIENT
Start: 2025-03-18 | End: 2025-03-18

## 2025-03-18 RX ADMIN — PROPOFOL 30 MG: 10 INJECTION, EMULSION INTRAVENOUS at 10:37

## 2025-03-18 RX ADMIN — PROPOFOL 30 MG: 10 INJECTION, EMULSION INTRAVENOUS at 10:28

## 2025-03-18 RX ADMIN — PROPOFOL 20 MG: 10 INJECTION, EMULSION INTRAVENOUS at 10:33

## 2025-03-18 RX ADMIN — PROPOFOL 30 MG: 10 INJECTION, EMULSION INTRAVENOUS at 10:30

## 2025-03-18 RX ADMIN — SODIUM CHLORIDE, SODIUM LACTATE, POTASSIUM CHLORIDE, AND CALCIUM CHLORIDE 125 ML/HR: .6; .31; .03; .02 INJECTION, SOLUTION INTRAVENOUS at 10:21

## 2025-03-18 RX ADMIN — PROPOFOL 100 MG: 10 INJECTION, EMULSION INTRAVENOUS at 10:27

## 2025-03-18 RX ADMIN — PROPOFOL 20 MG: 10 INJECTION, EMULSION INTRAVENOUS at 10:35

## 2025-03-18 NOTE — ANESTHESIA PREPROCEDURE EVALUATION
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)      Respiratory/Allergy   (+) Lung nodule      FEN/Gastrointestinal   (+) Crohn's disease of small intestine with abscess (HCC)      EKG 6/28/2024:  Normal sinus rhythm with sinus arrhythmia  Normal ECG  When compared with ECG of 02-OCT-2021 15:38,  No significant change was found    Lab Results   Component Value Date    WBC 9.55 02/06/2025    HGB 14.5 02/06/2025    HCT 42.7 02/06/2025    MCV 88 02/06/2025     02/06/2025     Lab Results   Component Value Date    SODIUM 139 02/06/2025    K 4.4 02/06/2025     02/06/2025    CO2 29 02/06/2025    BUN 14 02/06/2025    CREATININE 0.92 02/06/2025    GLUC 127 02/06/2025    CALCIUM 9.6 02/06/2025     No results found for: \"INR\", \"PROTIME\"  Lab Results   Component Value Date    HGBA1C 6.0 (H) 09/27/2024          Physical Exam    Airway    Mallampati score: I  TM Distance: >3 FB  Neck ROM: full     Dental   No notable dental hx     Cardiovascular  Cardiovascular exam normal    Pulmonary  Pulmonary exam normal     Other Findings        Anesthesia Plan  ASA Score- 2     Anesthesia Type- IV sedation with anesthesia with ASA Monitors.         Additional Monitors:     Airway Plan:            Plan Factors-Exercise tolerance (METS): >4 METS.    Chart reviewed. EKG reviewed.  Existing labs reviewed. Patient summary reviewed.                  Induction- intravenous.    Postoperative Plan-         Informed Consent- Anesthetic plan and risks discussed with patient.  I personally reviewed this patient with the CRNA. Discussed and " agreed on the Anesthesia Plan with the CRNA..      NPO Status:  No vitals data found for the desired time range.

## 2025-03-18 NOTE — H&P
"History and Physical - [facility]  Victor Hugo Hurtado 34 y.o. male MRN: [id number]                  HPI: Victor Hugo Hurtado is a 34 y.o. year old male who presents for crohn's      REVIEW OF SYSTEMS: Per the HPI, and otherwise unremarkable.    Historical Information   Past Medical History:   Diagnosis Date    Crohn's colitis (HCC)      Past Surgical History:   Procedure Laterality Date    COLONOSCOPY      MD LAPAROSCOPY COLECTOMY PARTIAL W/ANASTOMOSIS N/A 6/26/2024    Procedure: HAND-ASSISTED LAPAROSCOPIC ILEOCECETOMY;  Surgeon: Jana Lugo MD;  [facility];  Service: Colorectal    MD SIGMOIDOSCOPY FLX DX W/COLLJ SPEC BR/WA IF PFRMD N/A 6/26/2024    Procedure: SIGMOIDOSCOPY FLEXIBLE;  Surgeon: Jana Lugo MD;  [facility];  Service: Colorectal     Social History   Social History     Substance and Sexual Activity   Alcohol Use Not Currently     Social History     Substance and Sexual Activity   Drug Use Yes    Types: Marijuana    Comment: states 2 ounces/month, medical card     Social History     Tobacco Use   Smoking Status Every Day    Current packs/day: 0.50    Types: Cigarettes   Smokeless Tobacco Never     Family History   Problem Relation Age of Onset    No Known Problems Mother     No Known Problems Father     Colon cancer Neg Hx     Colon polyps Neg Hx        Meds/Allergies     Not in a hospital admission.    Allergies   Allergen Reactions    Ammonia Nasal Congestion    Molds & Smuts Other (See Comments)     Discovered through allergen testing       Objective     Height 5' 9\" (1.753 m), weight 70.3 kg (155 lb).      PHYSICAL EXAMINATION:    General Appearance:   Alert, cooperative, no distress   HEENT:  Normocephalic, atraumatic, anicteric. Neck supple, symmetrical, trachea midline.   Lungs:   Equal chest rise and unlabored breathing, normal effort, no coughing.   Cardiovascular:   No visualized JVD.   Abdomen:   No abdominal " distension.   Skin:   No jaundice, rashes, or lesions.    Musculoskeletal:   Normal range of motion visualized.   Psych:  Normal affect and normal insight.   Neuro:  Alert and appropriate.           ASSESSMENT/PLAN:  This is a 34 y.o. year old male here for colonoscopy, and he is stable and optimized for his procedure.

## 2025-03-25 ENCOUNTER — RESULTS FOLLOW-UP (OUTPATIENT)
Age: 35
End: 2025-03-25

## 2025-03-25 PROCEDURE — 88305 TISSUE EXAM BY PATHOLOGIST: CPT | Performed by: PATHOLOGY

## 2025-03-25 PROCEDURE — 88341 IMHCHEM/IMCYTCHM EA ADD ANTB: CPT | Performed by: PATHOLOGY

## 2025-03-25 PROCEDURE — 88342 IMHCHEM/IMCYTCHM 1ST ANTB: CPT | Performed by: PATHOLOGY

## 2025-04-29 ENCOUNTER — OFFICE VISIT (OUTPATIENT)
Dept: URGENT CARE | Facility: CLINIC | Age: 35
End: 2025-04-29
Payer: COMMERCIAL

## 2025-04-29 VITALS
TEMPERATURE: 98.5 F | SYSTOLIC BLOOD PRESSURE: 120 MMHG | HEART RATE: 84 BPM | OXYGEN SATURATION: 98 % | RESPIRATION RATE: 18 BRPM | DIASTOLIC BLOOD PRESSURE: 70 MMHG

## 2025-04-29 DIAGNOSIS — J01.10 ACUTE NON-RECURRENT FRONTAL SINUSITIS: Primary | ICD-10-CM

## 2025-04-29 PROCEDURE — 99213 OFFICE O/P EST LOW 20 MIN: CPT | Performed by: FAMILY MEDICINE

## 2025-04-29 RX ORDER — AZITHROMYCIN 250 MG/1
TABLET, FILM COATED ORAL
Qty: 6 TABLET | Refills: 0 | Status: SHIPPED | OUTPATIENT
Start: 2025-04-29 | End: 2025-05-03

## 2025-04-29 RX ORDER — METHYLPREDNISOLONE 4 MG/1
TABLET ORAL
Qty: 21 TABLET | Refills: 0 | Status: SHIPPED | OUTPATIENT
Start: 2025-04-29

## 2025-04-29 NOTE — PROGRESS NOTES
[facility]        NAME: Victor Hugo Hurtado is a 34 y.o. male  : 1990    MRN: [id number]  DATE: 2025  TIME: 1:33 PM    Assessment and Plan   Acute non-recurrent frontal sinusitis [J01.10]  1. Acute non-recurrent frontal sinusitis  azithromycin (ZITHROMAX) 250 mg tablet    methylPREDNISolone 4 MG tablet therapy pack            Patient Instructions       Follow up with PCP in 3-5 days.  Proceed to  ER if symptoms worsen.    If tests have been performed at [facility], our office will contact you with results if changes need to be made to the care plan discussed with you at the visit.  You can review your full results on [facility].    Chief Complaint     Chief Complaint   Patient presents with    Sinus Congestion     Patient states he's had a runny nose, congestion, chills, and a headache since . Patient states he took mucinex and Claritin D but did not have relief.          History of Present Illness       34-year-old male with 3-day history of increased nasal congestion, sinus pressure and runny nose.  Denies any fevers or chills.        Review of Systems   Review of Systems   Constitutional: Negative.    HENT:  Positive for sinus pressure and sinus pain.    Eyes: Negative.    Respiratory: Negative.     Cardiovascular: Negative.    Gastrointestinal: Negative.    Genitourinary: Negative.    Skin: Negative.    Allergic/Immunologic: Negative.    Neurological: Negative.    Hematological: Negative.    Psychiatric/Behavioral: Negative.           Current Medications       Current Outpatient Medications:     azithromycin (ZITHROMAX) 250 mg tablet, Take 2 tablets today then 1 tablet daily x 4 days, Disp: 6 tablet, Rfl: 0    methylPREDNISolone 4 MG tablet therapy pack, Use as directed on package, Disp: 21 tablet, Rfl: 0    Azelastine HCl 137 MCG/SPRAY SOLN, INSERT 1 TO 2 SPRAYS INTO EACH NOSTRIL TWICE A DAY AS NEEDED FOR RHINITIS. USE IN EACH NOSTRIL AS DIRECTED., Disp: 90 mL,  Rfl: 1    Cholecalciferol (Vitamin D) 125 MCG (5000 UT) CAPS, Take 1 capsule by mouth in the morning, Disp: 90 capsule, Rfl: 3    ergocalciferol (ERGOCALCIFEROL) 1.25 MG (15088 UT) capsule, Take 1 capsule (50,000 Units total) by mouth once a week for 16 doses, Disp: 16 capsule, Rfl: 0    risankizumab-rzaa (Skyrizi) 360 MG/2.4ML SOCT, Inject 1 subcutaneously once every 8 weeks, Disp: 2.4 mL, Rfl: 6    risankizumab-rzaa (Skyrizi) 360 MG/2.4ML SOCT, Take body injector (OBI) under skin every 56 days, Disp: 2.4 mL, Rfl: 6    vitamin B-12 (VITAMIN B-12) 1,000 mcg tablet, Take 1 tablet (1,000 mcg total) by mouth daily, Disp: 90 tablet, Rfl: 3    Current Allergies     Allergies as of 04/29/2025 - Reviewed 04/29/2025   Allergen Reaction Noted    Ammonia Nasal Congestion 10/02/2021    Molds & smuts Other (See Comments) 10/18/2024            The following portions of the patient's history were reviewed and updated as appropriate: allergies, current medications, past family history, past medical history, past social history, past surgical history and problem list.     Past Medical History:   Diagnosis Date    Crohn's colitis (HCC)        Past Surgical History:   Procedure Laterality Date    COLONOSCOPY      VT LAPAROSCOPY COLECTOMY PARTIAL W/ANASTOMOSIS N/A 6/26/2024    Procedure: HAND-ASSISTED LAPAROSCOPIC ILEOCECETOMY;  Surgeon: Jana Lugo MD;  [facility]: [facility];  Service: Colorectal    VT SIGMOIDOSCOPY FLX DX W/COLLJ SPEC BR/WA IF PFRMD N/A 6/26/2024    Procedure: SIGMOIDOSCOPY FLEXIBLE;  Surgeon: Jana Lugo MD;  Location: [facility];  Service: Colorectal       Family History   Problem Relation Age of Onset    No Known Problems Mother     No Known Problems Father     Colon cancer Neg Hx     Colon polyps Neg Hx          Medications have been verified.        Objective   /70   Pulse 84   Temp 98.5 °F (36.9 °C)   Resp 18   SpO2 98%   No LMP for male patient.       Physical Exam      Physical Exam  Constitutional:       Appearance: He is well-developed.   HENT:      Head: Normocephalic.      Right Ear: Tympanic membrane normal.      Left Ear: Tympanic membrane normal.      Nose: Congestion present.   Eyes:      Pupils: Pupils are equal, round, and reactive to light.   Cardiovascular:      Rate and Rhythm: Normal rate and regular rhythm.   Pulmonary:      Effort: Pulmonary effort is normal.   Abdominal:      General: Abdomen is flat.   Musculoskeletal:         General: Normal range of motion.      Cervical back: Normal range of motion.   Skin:     General: Skin is warm.   Neurological:      Mental Status: He is alert.

## 2025-04-29 NOTE — LETTER
April 29, 2025     Patient: Victor Hugo Hurtado   YOB: 1990   Date of Visit: 4/29/2025       To Whom it May Concern:    Victor Hugo Hurtado was seen in my clinic on 4/29/2025. He may return to work tomorrow, 4/30/2025.     If you have any questions or concerns, please don't hesitate to call.         Sincerely,          Kristian Reardon, DO        CC: No Recipients

## 2025-05-29 ENCOUNTER — OFFICE VISIT (OUTPATIENT)
Dept: FAMILY MEDICINE CLINIC | Facility: HOSPITAL | Age: 35
End: 2025-05-29
Payer: COMMERCIAL

## 2025-05-29 VITALS
SYSTOLIC BLOOD PRESSURE: 130 MMHG | BODY MASS INDEX: 21.48 KG/M2 | HEIGHT: 69 IN | HEART RATE: 91 BPM | RESPIRATION RATE: 16 BRPM | OXYGEN SATURATION: 95 % | TEMPERATURE: 99.8 F | WEIGHT: 145 LBS | DIASTOLIC BLOOD PRESSURE: 78 MMHG

## 2025-05-29 DIAGNOSIS — Z13.6 SCREENING FOR CARDIOVASCULAR CONDITION: ICD-10-CM

## 2025-05-29 DIAGNOSIS — K50.014 CROHN'S DISEASE OF SMALL INTESTINE WITH ABSCESS (HCC): Primary | ICD-10-CM

## 2025-05-29 DIAGNOSIS — Z13.1 SCREENING FOR DIABETES MELLITUS: ICD-10-CM

## 2025-05-29 DIAGNOSIS — F17.200 TOBACCO DEPENDENCY: ICD-10-CM

## 2025-05-29 DIAGNOSIS — E61.1 IRON DEFICIENCY: ICD-10-CM

## 2025-05-29 DIAGNOSIS — E55.9 VITAMIN D DEFICIENCY: ICD-10-CM

## 2025-05-29 DIAGNOSIS — E53.8 VITAMIN B12 DEFICIENCY: ICD-10-CM

## 2025-05-29 PROBLEM — J30.2 SEASONAL ALLERGIC RHINITIS: Status: ACTIVE | Noted: 2025-05-29

## 2025-05-29 PROBLEM — F19.11 SUBSTANCE ABUSE IN REMISSION (HCC): Status: RESOLVED | Noted: 2024-06-27 | Resolved: 2025-05-29

## 2025-05-29 PROBLEM — K63.0 INTESTINAL ABSCESS: Status: RESOLVED | Noted: 2023-10-26 | Resolved: 2025-05-29

## 2025-05-29 PROBLEM — A49.8 CLOSTRIDIOIDES DIFFICILE INFECTION: Status: RESOLVED | Noted: 2023-11-22 | Resolved: 2025-05-29

## 2025-05-29 PROBLEM — J01.10 ACUTE NON-RECURRENT FRONTAL SINUSITIS: Status: RESOLVED | Noted: 2025-04-29 | Resolved: 2025-05-29

## 2025-05-29 PROBLEM — R20.0 NUMBNESS: Status: RESOLVED | Noted: 2024-11-04 | Resolved: 2025-05-29

## 2025-05-29 PROBLEM — R91.1 LUNG NODULE: Status: RESOLVED | Noted: 2023-11-20 | Resolved: 2025-05-29

## 2025-05-29 PROBLEM — D72.829 LEUKOCYTOSIS: Status: RESOLVED | Noted: 2024-04-16 | Resolved: 2025-05-29

## 2025-05-29 PROCEDURE — 99204 OFFICE O/P NEW MOD 45 MIN: CPT | Performed by: INTERNAL MEDICINE

## 2025-05-29 RX ORDER — NICOTINE 21 MG/24HR
1 PATCH, TRANSDERMAL 24 HOURS TRANSDERMAL EVERY 24 HOURS
Qty: 28 PATCH | Refills: 0 | Status: SHIPPED | OUTPATIENT
Start: 2025-05-29

## 2025-05-29 NOTE — ASSESSMENT & PLAN NOTE
He also was deficient in vitamin D in February.  He is taking vitamin D supplements every week.  I will recheck vitamin D level  Orders:    Vitamin D 25 hydroxy; Future

## 2025-05-29 NOTE — ASSESSMENT & PLAN NOTE
Patient presents to establish care in my practice.    Patient has Crohn's disease, he status post surgery.  He denies nausea or abdominal pain, diarrhea, signs of GI bleeding.  He feels that his appetite is good.    His abdomen is soft and nontender today.    He had mild iron deficiency in February.  I will check his hemoglobin and iron levels.    Continue skyrizi  Orders:    Comprehensive metabolic panel; Future    CBC; Future

## 2025-05-29 NOTE — ASSESSMENT & PLAN NOTE
Patient also has malabsorption with B12 deficiency in February of this year.  He is taking B12 supplements.  I will recheck the B12 level  Orders:    Vitamin B12; Future

## 2025-05-29 NOTE — ASSESSMENT & PLAN NOTE
As above he had mild iron deficiency.  I will recheck iron levels  Orders:    CBC; Future    Iron Panel (Includes Ferritin, Iron Sat%, Iron, and TIBC); Future

## 2025-05-29 NOTE — PROGRESS NOTES
Name: Victor Hugo Hurtado      : 1990      MRN: [id number]  Encounter Provider: Wilmer Rothman MD  Encounter Date: 2025   Encounter department: [facility] PRIMARY CARE [address]  :  Assessment & Plan  Crohn's disease of small intestine with abscess (HCC)  Patient presents to establish care in my practice.    Patient has Crohn's disease, he status post surgery.  He denies nausea or abdominal pain, diarrhea, signs of GI bleeding.  He feels that his appetite is good.    His abdomen is soft and nontender today.    He had mild iron deficiency in February.  I will check his hemoglobin and iron levels.    Continue skyrizi  Orders:    Comprehensive metabolic panel; Future    CBC; Future    Vitamin B12 deficiency  Patient also has malabsorption with B12 deficiency in February of this year.  He is taking B12 supplements.  I will recheck the B12 level  Orders:    Vitamin B12; Future    Iron deficiency  As above he had mild iron deficiency.  I will recheck iron levels  Orders:    CBC; Future    Iron Panel (Includes Ferritin, Iron Sat%, Iron, and TIBC); Future    Vitamin D deficiency  He also was deficient in vitamin D in February.  He is taking vitamin D supplements every week.  I will recheck vitamin D level  Orders:    Vitamin D 25 hydroxy; Future    Tobacco dependency  He started smoking when he was 11 years old.  He smokes half a pack a day when he is under a lot of stress.  Currently he is smoking 3 cigarettes a day and he is trying to wean himself off.  He was open to smoking cessation counseling and accepted nicotine patch and nicotine gum to help him quit smoking.    He denies cough, shortness of breath, wheezing.  His lungs were clear to auscultation today.    I encouraged him to quit smoking but also help with his Crohn's disease.      Orders:    nicotine (NICODERM CQ) 14 mg/24hr TD 24 hr patch; Place 1 patch on the skin every 24 hours over 24 hours    nicotine (NICODERM CQ) 21 mg/24 hr  "TD 24 hr patch; Place 1 patch on the skin every 24 hours over 24 hours    nicotine polacrilex (NICORETTE) 2 mg gum; Chew 1 each (2 mg total) as needed for smoking cessation    Screening for diabetes mellitus    Orders:    Comprehensive metabolic panel; Future    Hemoglobin A1C; Future    Screening for cardiovascular condition    Orders:    Lipid panel; Future          Depression Screening and Follow-up Plan: Patient was screened for depression during today's encounter. They screened negative with a PHQ-2 score of 0.      Tobacco Cessation Counseling: Tobacco cessation counseling was provided. The patient is sincerely urged to quit consumption of tobacco. He is ready to quit tobacco. Medication options discussed. Nicotine gum and nicotine patch was prescribed.       History of Present Illness   HPI  Review of Systems   Respiratory:  Negative for cough and shortness of breath.    Cardiovascular:  Negative for chest pain.   Gastrointestinal:  Negative for abdominal pain, blood in stool, constipation, diarrhea and nausea.   Genitourinary:  Negative for difficulty urinating.   Musculoskeletal:  Negative for arthralgias and myalgias.   Skin:  Negative for rash.   Psychiatric/Behavioral:  Negative for dysphoric mood.        Objective   /78   Pulse 91   Temp 99.8 °F (37.7 °C) (Tympanic)   Resp 16   Ht 5' 8.5\" (1.74 m)   Wt 65.8 kg (145 lb)   SpO2 95%   BMI 21.73 kg/m²      Physical Exam  Constitutional:       General: He is not in acute distress.     Appearance: He is well-developed. He is not toxic-appearing.   HENT:      Head: Normocephalic.      Mouth/Throat:      Mouth: Mucous membranes are moist.      Pharynx: No oropharyngeal exudate.     Eyes:      Conjunctiva/sclera: Conjunctivae normal.       Cardiovascular:      Rate and Rhythm: Normal rate and regular rhythm.      Heart sounds: No murmur heard.     No gallop.   Pulmonary:      Effort: No respiratory distress.      Breath sounds: No wheezing or rales. "   Abdominal:      General: Bowel sounds are normal.      Palpations: Abdomen is soft.      Tenderness: There is no abdominal tenderness.     Musculoskeletal:      Cervical back: Neck supple.     Skin:     General: Skin is warm and dry.     Neurological:      Mental Status: He is alert and oriented to person, place, and time.      Cranial Nerves: No cranial nerve deficit.      Motor: No weakness.      Gait: Gait normal.     Psychiatric:         Mood and Affect: Mood normal.

## 2025-05-30 ENCOUNTER — OFFICE VISIT (OUTPATIENT)
Dept: URGENT CARE | Facility: CLINIC | Age: 35
End: 2025-05-30
Payer: COMMERCIAL

## 2025-05-30 VITALS
SYSTOLIC BLOOD PRESSURE: 128 MMHG | HEIGHT: 68 IN | RESPIRATION RATE: 16 BRPM | HEART RATE: 97 BPM | OXYGEN SATURATION: 98 % | BODY MASS INDEX: 21.82 KG/M2 | WEIGHT: 144 LBS | DIASTOLIC BLOOD PRESSURE: 70 MMHG | TEMPERATURE: 99.2 F

## 2025-05-30 DIAGNOSIS — J01.90 ACUTE RHINOSINUSITIS: Primary | ICD-10-CM

## 2025-05-30 PROCEDURE — 99213 OFFICE O/P EST LOW 20 MIN: CPT | Performed by: NURSE PRACTITIONER

## 2025-05-30 RX ORDER — PREDNISONE 20 MG/1
20 TABLET ORAL 2 TIMES DAILY WITH MEALS
Qty: 10 TABLET | Refills: 0 | Status: SHIPPED | OUTPATIENT
Start: 2025-05-30 | End: 2025-06-04

## 2025-05-30 RX ORDER — BROMPHENIRAMINE MALEATE, PSEUDOEPHEDRINE HYDROCHLORIDE, AND DEXTROMETHORPHAN HYDROBROMIDE 2; 30; 10 MG/5ML; MG/5ML; MG/5ML
10 SYRUP ORAL 3 TIMES DAILY PRN
Qty: 150 ML | Refills: 0 | Status: SHIPPED | OUTPATIENT
Start: 2025-05-30

## 2025-05-30 NOTE — PROGRESS NOTES
[facility]        NAME: Victor Hugo Hurtado is a 34 y.o. male  : 1990    MRN: [id number]  DATE: May 30, 2025  TIME: 5:24 PM    Assessment and Plan   Acute rhinosinusitis [J01.90]  1. Acute rhinosinusitis  brompheniramine-pseudoephedrine-DM 30-2-10 MG/5ML syrup    predniSONE 20 mg tablet            Patient Instructions       Take meds as prescribed   Follow up with PCP in 3-5 days.  Proceed to  ER if symptoms worsen.    If tests have been performed at [facility], our office will contact you with results if changes need to be made to the care plan discussed with you at the visit.  You can review your full results on [facility].    Chief Complaint     Chief Complaint   Patient presents with    Cold Like Symptoms     Pt reports 4 days of congestion, PND and b/l ear pain. Denies fevers.          History of Present Illness       HPI  Reports sinus problems for 3-4 days. Also nose and head congestion, bilateral ear pain and runny nose. Denies fever, nausea and vomiting. Says he was in a burn fever before this started. Chronic Hx of sinusitis.       Review of Systems   Review of Systems   Constitutional:  Negative for fever.   HENT:  Positive for congestion, ear pain and postnasal drip. Negative for sore throat.    Respiratory:  Negative for cough and wheezing.    Cardiovascular:  Negative for chest pain.   Gastrointestinal:  Negative for diarrhea and vomiting.   Neurological:  Negative for headaches.         Current Medications     Current Medications[1]    Current Allergies     Allergies as of 2025 - Reviewed 2025   Allergen Reaction Noted    Ammonia Nasal Congestion 10/02/2021    Molds & smuts Other (See Comments) 10/18/2024            The following portions of the patient's history were reviewed and updated as appropriate: allergies, current medications, past family history, past medical history, past social history, past surgical history and problem list.     Past Medical  "History[2]    Past Surgical History[3]    Family History[4]      Medications have been verified.        Objective   /70   Pulse 97   Temp 99.2 °F (37.3 °C)   Resp 16   Ht 5' 8\" (1.727 m)   Wt 65.3 kg (144 lb)   SpO2 98%   BMI 21.90 kg/m²   No LMP for male patient.       Physical Exam     Physical Exam  Constitutional:       General: He is not in acute distress.     Appearance: He is not ill-appearing.   HENT:      Head:      Comments: NTTP of the sinuses     Right Ear: Tympanic membrane normal.      Left Ear: Tympanic membrane normal.      Nose: Rhinorrhea present.      Mouth/Throat:      Pharynx: No posterior oropharyngeal erythema.      Comments: Mild post nasal drip    Cardiovascular:      Rate and Rhythm: Regular rhythm.      Heart sounds: Normal heart sounds.   Pulmonary:      Effort: Pulmonary effort is normal.      Breath sounds: Normal breath sounds.     Musculoskeletal:      Cervical back: Tenderness (with palpation of the right side of the neck) present.   Lymphadenopathy:      Cervical: No cervical adenopathy.                        [1]   Current Outpatient Medications:     brompheniramine-pseudoephedrine-DM 30-2-10 MG/5ML syrup, Take 10 mL by mouth 3 (three) times a day as needed for cough, Disp: 150 mL, Rfl: 0    predniSONE 20 mg tablet, Take 1 tablet (20 mg total) by mouth 2 (two) times a day with meals for 5 days, Disp: 10 tablet, Rfl: 0    risankizumab-rzaa (Skyrizi) 360 MG/2.4ML SOCT, Inject 1 subcutaneously once every 8 weeks, Disp: 2.4 mL, Rfl: 6    vitamin B-12 (VITAMIN B-12) 1,000 mcg tablet, Take 1 tablet (1,000 mcg total) by mouth daily, Disp: 90 tablet, Rfl: 3    Azelastine HCl 137 MCG/SPRAY SOLN, INSERT 1 TO 2 SPRAYS INTO EACH NOSTRIL TWICE A DAY AS NEEDED FOR RHINITIS. USE IN EACH NOSTRIL AS DIRECTED., Disp: 90 mL, Rfl: 1    ergocalciferol (ERGOCALCIFEROL) 1.25 MG (97585 UT) capsule, Take 1 capsule (50,000 Units total) by mouth once a week for 16 doses, Disp: 16 capsule, Rfl: " 0    nicotine (NICODERM CQ) 14 mg/24hr TD 24 hr patch, Place 1 patch on the skin every 24 hours over 24 hours (Patient not taking: Reported on 5/30/2025), Disp: 28 patch, Rfl: 0    nicotine (NICODERM CQ) 21 mg/24 hr TD 24 hr patch, Place 1 patch on the skin every 24 hours over 24 hours (Patient not taking: Reported on 5/30/2025), Disp: 28 patch, Rfl: 0    nicotine polacrilex (NICORETTE) 2 mg gum, Chew 1 each (2 mg total) as needed for smoking cessation (Patient not taking: Reported on 5/30/2025), Disp: 100 each, Rfl: 0    risankizumab-rzaa (Skyrizi) 360 MG/2.4ML SOCT, Take body injector (OBI) under skin every 56 days, Disp: 2.4 mL, Rfl: 6  [2]   Past Medical History:  Diagnosis Date    Clostridioides difficile infection 11/22/2023    Crohn's colitis (HCC)     Intestinal abscess 10/26/2023    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) 06/27/2024   [3]   Past Surgical History:  Procedure Laterality Date    COLONOSCOPY      [place] LAPAROSCOPY COLECTOMY PARTIAL W/ANASTOMOSIS N/A 6/26/2024    Procedure: HAND-ASSISTED LAPAROSCOPIC ILEOCECETOMY;  Surgeon: Jana Lugo MD;  Location: [facility];  Service: Colorectal    TN SIGMOIDOSCOPY FLX DX W/COLLJ SPEC BR/WA IF PFRMD N/A 6/26/2024    Procedure: SIGMOIDOSCOPY FLEXIBLE;  Surgeon: Jana Lugo MD;  Location: [facility];  Service: Colorectal   [4]   Family History  Problem Relation Name Age of Onset    No Known Problems Mother      No Known Problems Father      Colon cancer Neg Hx      Colon polyps Neg Hx

## 2025-05-31 ENCOUNTER — APPOINTMENT (OUTPATIENT)
Dept: LAB | Facility: HOSPITAL | Age: 35
End: 2025-05-31
Payer: COMMERCIAL

## 2025-05-31 DIAGNOSIS — Z13.1 SCREENING FOR DIABETES MELLITUS: ICD-10-CM

## 2025-05-31 DIAGNOSIS — E55.9 VITAMIN D DEFICIENCY: ICD-10-CM

## 2025-05-31 DIAGNOSIS — Z13.6 SCREENING FOR CARDIOVASCULAR CONDITION: ICD-10-CM

## 2025-05-31 DIAGNOSIS — E53.8 VITAMIN B12 DEFICIENCY: ICD-10-CM

## 2025-05-31 DIAGNOSIS — E61.1 IRON DEFICIENCY: ICD-10-CM

## 2025-05-31 DIAGNOSIS — K50.014 CROHN'S DISEASE OF SMALL INTESTINE WITH ABSCESS (HCC): ICD-10-CM

## 2025-05-31 LAB
25(OH)D3 SERPL-MCNC: 27.2 NG/ML (ref 30–100)
ALBUMIN SERPL BCG-MCNC: 4.6 G/DL (ref 3.5–5)
ALP SERPL-CCNC: 77 U/L (ref 34–104)
ALT SERPL W P-5'-P-CCNC: 26 U/L (ref 7–52)
ANION GAP SERPL CALCULATED.3IONS-SCNC: 7 MMOL/L (ref 4–13)
AST SERPL W P-5'-P-CCNC: 18 U/L (ref 13–39)
BILIRUB SERPL-MCNC: 1.45 MG/DL (ref 0.2–1)
BUN SERPL-MCNC: 14 MG/DL (ref 5–25)
CALCIUM SERPL-MCNC: 9.7 MG/DL (ref 8.4–10.2)
CHLORIDE SERPL-SCNC: 101 MMOL/L (ref 96–108)
CHOLEST SERPL-MCNC: 147 MG/DL (ref ?–200)
CO2 SERPL-SCNC: 28 MMOL/L (ref 21–32)
CREAT SERPL-MCNC: 0.83 MG/DL (ref 0.6–1.3)
ERYTHROCYTE [DISTWIDTH] IN BLOOD BY AUTOMATED COUNT: 12.3 % (ref 11.6–15.1)
EST. AVERAGE GLUCOSE BLD GHB EST-MCNC: 123 MG/DL
FERRITIN SERPL-MCNC: 91 NG/ML (ref 30–336)
GFR SERPL CREATININE-BSD FRML MDRD: 113 ML/MIN/1.73SQ M
GLUCOSE P FAST SERPL-MCNC: 160 MG/DL (ref 65–99)
HBA1C MFR BLD: 5.9 %
HCT VFR BLD AUTO: 47.3 % (ref 36.5–49.3)
HDLC SERPL-MCNC: 51 MG/DL
HGB BLD-MCNC: 15.5 G/DL (ref 12–17)
IRON SATN MFR SERPL: 4 % (ref 15–50)
IRON SERPL-MCNC: 17 UG/DL (ref 50–212)
LDLC SERPL CALC-MCNC: 62 MG/DL (ref 0–100)
MCH RBC QN AUTO: 29.4 PG (ref 26.8–34.3)
MCHC RBC AUTO-ENTMCNC: 32.8 G/DL (ref 31.4–37.4)
MCV RBC AUTO: 90 FL (ref 82–98)
NONHDLC SERPL-MCNC: 96 MG/DL
PLATELET # BLD AUTO: 302 THOUSANDS/UL (ref 149–390)
PMV BLD AUTO: 9.5 FL (ref 8.9–12.7)
POTASSIUM SERPL-SCNC: 4.3 MMOL/L (ref 3.5–5.3)
PROT SERPL-MCNC: 8.2 G/DL (ref 6.4–8.4)
RBC # BLD AUTO: 5.27 MILLION/UL (ref 3.88–5.62)
SODIUM SERPL-SCNC: 136 MMOL/L (ref 135–147)
TIBC SERPL-MCNC: 453.6 UG/DL (ref 250–450)
TRANSFERRIN SERPL-MCNC: 324 MG/DL (ref 203–362)
TRIGL SERPL-MCNC: 172 MG/DL (ref ?–150)
UIBC SERPL-MCNC: 437 UG/DL (ref 155–355)
VIT B12 SERPL-MCNC: 158 PG/ML (ref 180–914)
WBC # BLD AUTO: 11.35 THOUSAND/UL (ref 4.31–10.16)

## 2025-05-31 PROCEDURE — 83550 IRON BINDING TEST: CPT

## 2025-05-31 PROCEDURE — 36415 COLL VENOUS BLD VENIPUNCTURE: CPT

## 2025-05-31 PROCEDURE — 82607 VITAMIN B-12: CPT

## 2025-05-31 PROCEDURE — 82306 VITAMIN D 25 HYDROXY: CPT

## 2025-05-31 PROCEDURE — 85027 COMPLETE CBC AUTOMATED: CPT

## 2025-05-31 PROCEDURE — 82728 ASSAY OF FERRITIN: CPT

## 2025-05-31 PROCEDURE — 80061 LIPID PANEL: CPT

## 2025-05-31 PROCEDURE — 83540 ASSAY OF IRON: CPT

## 2025-05-31 PROCEDURE — 80053 COMPREHEN METABOLIC PANEL: CPT

## 2025-05-31 PROCEDURE — 83036 HEMOGLOBIN GLYCOSYLATED A1C: CPT

## 2025-06-01 ENCOUNTER — RESULTS FOLLOW-UP (OUTPATIENT)
Dept: FAMILY MEDICINE CLINIC | Facility: HOSPITAL | Age: 35
End: 2025-06-01

## 2025-06-02 NOTE — TELEPHONE ENCOUNTER
----- Message from Wilmer Rothman MD sent at 6/1/2025  5:27 PM EDT -----  Labs show low vitamin d, vitamin b12 and iron levels. Blood sugars have been in between normal and diabetes.Electrolytes, kidney and liver function tests are fine. Cholesterol is fine. Ask him if he   takes vitamin d, vitamin b12 supplements daily and whether he has received iron infusions before or  has taken iron supplements by mouth before.   ----- Message -----  From: Lab, Background User  Sent: 5/31/2025   8:55 AM EDT  To: Wilmer Rothman MD

## 2025-06-09 DIAGNOSIS — E53.8 VITAMIN B12 DEFICIENCY: Primary | ICD-10-CM

## 2025-06-09 DIAGNOSIS — E55.9 VITAMIN D DEFICIENCY: ICD-10-CM

## 2025-06-09 DIAGNOSIS — E61.1 IRON DEFICIENCY: Primary | ICD-10-CM

## 2025-06-09 DIAGNOSIS — K50.014 CROHN'S DISEASE OF SMALL INTESTINE WITH ABSCESS (HCC): ICD-10-CM

## 2025-06-09 DIAGNOSIS — E53.8 VITAMIN B12 DEFICIENCY: ICD-10-CM

## 2025-06-09 DIAGNOSIS — E61.1 IRON DEFICIENCY: ICD-10-CM

## 2025-06-09 RX ORDER — ERGOCALCIFEROL 1.25 MG/1
50000 CAPSULE ORAL WEEKLY
Qty: 12 CAPSULE | Refills: 3 | Status: SHIPPED | OUTPATIENT
Start: 2025-06-09

## 2025-06-09 RX ORDER — SODIUM CHLORIDE 9 MG/ML
20 INJECTION, SOLUTION INTRAVENOUS ONCE
OUTPATIENT
Start: 2025-06-09

## 2025-06-09 RX ORDER — LANOLIN ALCOHOL/MO/W.PET/CERES
1000 CREAM (GRAM) TOPICAL DAILY
Qty: 90 TABLET | Refills: 3 | Status: SHIPPED | OUTPATIENT
Start: 2025-06-09

## 2025-06-09 NOTE — TELEPHONE ENCOUNTER
Pt returned call, he states he takes Vit B when he remembers, vit D 50,000 units once per week also when he remembers, and has never had a iron infusion or supplements. Pt is requesting to speak to  after 3 pm today.

## 2025-06-17 ENCOUNTER — OFFICE VISIT (OUTPATIENT)
Age: 35
End: 2025-06-17
Payer: COMMERCIAL

## 2025-06-17 VITALS
HEART RATE: 69 BPM | WEIGHT: 147.2 LBS | SYSTOLIC BLOOD PRESSURE: 110 MMHG | DIASTOLIC BLOOD PRESSURE: 70 MMHG | OXYGEN SATURATION: 98 % | TEMPERATURE: 97.6 F | HEIGHT: 68 IN | BODY MASS INDEX: 22.31 KG/M2

## 2025-06-17 DIAGNOSIS — E53.8 VITAMIN B12 DEFICIENCY: ICD-10-CM

## 2025-06-17 DIAGNOSIS — K50.814 CROHN'S DISEASE OF BOTH SMALL AND LARGE INTESTINE WITH ABSCESS (HCC): Primary | ICD-10-CM

## 2025-06-17 DIAGNOSIS — D84.9 IMMUNOCOMPROMISED STATE (HCC): ICD-10-CM

## 2025-06-17 PROCEDURE — 99214 OFFICE O/P EST MOD 30 MIN: CPT | Performed by: DIETITIAN, REGISTERED

## 2025-06-17 RX ORDER — CYANOCOBALAMIN 1000 UG/ML
INJECTION, SOLUTION INTRAMUSCULAR; SUBCUTANEOUS
Qty: 10 ML | Refills: 3 | Status: SHIPPED | OUTPATIENT
Start: 2025-06-17

## 2025-06-17 RX ORDER — NEEDLES, SAFETY 18GX1 1/2"
NEEDLE, DISPOSABLE MISCELLANEOUS
Qty: 30 EACH | Refills: 0 | Status: SHIPPED | OUTPATIENT
Start: 2025-06-17

## 2025-06-17 NOTE — ASSESSMENT & PLAN NOTE
History of ileal Crohn's disease complicated by abscess and stricture diagnosed in 11/2023, with progression despite vedolizumab therapy, now s/p ileocecectomy with primary side-to-side anastomosis in 6/2024 at which time operatively it was found he had a large phlegmon and abscess cavity.  Post-op he was started on infliximab but he developed numbness and neurological changes, now transitioned to risankizumab.    Emeka continues on risankizumab every 8 weeks.  He is feeling very well.  He has normal bowel function with 1-2 BMs daily or every other day.  BMs are formed/soft.  He denies any urgency, blood in the stool, nocturnal BMs, or incontinence.  Sometimes has a scant amount of rectal bleeding related to hemorrhoids.  Appetite is great and he has gained 20 lb in the last year since his surgery.  He denies any mouth sores or joint pains.  Has some eczema on forearm/hands intermittently.  He is smoking <1/2 PPD currently, working on quitting using nicotine patches.  He is currently taking vitamin D once weekly and is scheduled to have 5 IV iron and vitamin B12 infusions.    Labs 5/2025 reviewed.  CBC normal other than white count 11.35.  CMP normal other than fasting glucose 160, T. bili 1.45.  Vitamin D low, 27.2 (improved from 17.3 in 2/2025).  Iron panel noted iron sat 4, TIBC 453, serum iron 17, ferritin 91.  Vitamin B12 low, 158.    -Continue risankizumab every 8 weeks.  -Proceed with IV Venofer and vitamin B12 infusion as ordered by PCP.  -After completing infusions, recommend once monthly vitamin B12 injections for maintenance.  -Patient is working on quitting cigarettes.  I encouraged patient to continue with these efforts.  -Recommend routine vaccinations.  -Recommend establishing with dermatology for annual skin cancer screening.  -Follow up in 6 months.    Orders:  •  cyanocobalamin 1,000 mcg/mL; Inject 1 ml IM once per week for 4 weeks, then transition to once monthly injections.  •  SYRINGE/NEEDLE,  "DISP, 1 ML (BD Luer-Letty Syringe) 25G X 5/8\" 1 ML MISC; For use with vitamin B12 monthly injections  •  Ambulatory Referral to Dermatology; Future  "

## 2025-06-17 NOTE — PROGRESS NOTES
Name: Victor Hugo Hurtado      : 1990      MRN: [id number]  Encounter Provider: Flex Arthur PA-C  Encounter Date: 2025   Encounter department: [facility]  :  Assessment & Plan  Crohn's disease of both small and large intestine with abscess (HCC)  Immunocompromised state (HCC)  Vitamin B12 deficiency  History of ileal Crohn's disease complicated by abscess and stricture diagnosed in 2023, with progression despite vedolizumab therapy, now s/p ileocecectomy with primary side-to-side anastomosis in 2024 at which time operatively it was found he had a large phlegmon and abscess cavity.  Post-op he was started on infliximab but he developed numbness and neurological changes, now transitioned to risankizumab.    Emeka continues on risankizumab every 8 weeks.  He is feeling very well.  He has normal bowel function with 1-2 BMs daily or every other day.  BMs are formed/soft.  He denies any urgency, blood in the stool, nocturnal BMs, or incontinence.  Sometimes has a scant amount of rectal bleeding related to hemorrhoids.  Appetite is great and he has gained 20 lb in the last year since his surgery.  He denies any mouth sores or joint pains.  Has some eczema on forearm/hands intermittently.  He is smoking <1/2 PPD currently, working on quitting using nicotine patches.  He is currently taking vitamin D once weekly and is scheduled to have 5 IV iron and vitamin B12 infusions.    Labs 2025 reviewed.  CBC normal other than white count 11.35.  CMP normal other than fasting glucose 160, T. bili 1.45.  Vitamin D low, 27.2 (improved from 17.3 in 2025).  Iron panel noted iron sat 4, TIBC 453, serum iron 17, ferritin 91.  Vitamin B12 low, 158.    -Continue risankizumab every 8 weeks.  -Proceed with IV Venofer and vitamin B12 infusion as ordered by PCP.  -After completing infusions, recommend once monthly vitamin B12 injections for maintenance.  -Patient is  "working on quitting cigarettes.  I encouraged patient to continue with these efforts.  -Recommend routine vaccinations.  -Recommend establishing with dermatology for annual skin cancer screening.  -Follow up in 6 months.    Orders:  •  cyanocobalamin 1,000 mcg/mL; Inject 1 ml IM once per week for 4 weeks, then transition to once monthly injections.  •  SYRINGE/NEEDLE, DISP, 1 ML (BD Luer-Letty Syringe) 25G X 5/8\" 1 ML MISC; For use with vitamin B12 monthly injections  •  Ambulatory Referral to Dermatology; Future        History of Present Illness   Victor Hugo Hurtado is a 34 y.o. male with history of ileal Crohn's disease complicated by abscess and stricture diagnosed in 11/2023, with progression despite vedolizumab therapy, now s/p ileocecectomy with primary side-to-side anastomosis in 6/2024 at which time operatively it was found he had a large phlegmon and abscess cavity.  Post-op he was started on infliximab but he developed numbness and neurological changes, now transitioned to risankizumab.  He now presents for follow-up.  Last seen in 12/2024.    Emeka continues on risankizumab every 8 weeks.  He is feeling very well.  He has normal bowel function with 1-2 BMs daily or every other day.  BMs are formed/soft.  He denies any urgency, blood in the stool, nocturnal BMs, or incontinence.  Sometimes has a scant amount of rectal bleeding related to hemorrhoids.  Appetite is great and he has gained 20 lb in the last year since his surgery.  He denies any mouth sores or joint pains.  Has some eczema on forearm/hands intermittently.  He is smoking <1/2 PPD currently, working on quitting using nicotine patches.  He is currently taking vitamin D once weekly and is scheduled to have 5 IV iron and vitamin B12 infusions.    Labs 5/2025 reviewed.  CBC normal other than white count 11.35.  CMP normal other than fasting glucose 160, T. bili 1.45.  Vitamin D low, 27.2 (improved from 17.3 in 2/2025).  Iron panel noted iron sat " "4, TIBC 453, serum iron 17, ferritin 91.  Vitamin B12 low, 158.    Quant gold and chronic hepatitis panel negative/nonreactive in 2/2025.    Last colonoscopy 3/18/2025  The terminal ileum appeared normal.  Ulcerated side-to-side ileocolonic anastomosis in the terminal ileum; no bleeding was observed; performed cold forceps biopsy  The entire colon appeared normal. Performed random biopsy using biopsy forceps.    Pathology  A. Terminal ileum, biopsy:  -Small bowel mucosa with no significant histopathologic change.      B. Colon, anastomosis, biopsy:  -Colonic mucosa with focal cryptitis, intraepithelial neutrophils and mild crypt architectural distortion.  -Separate fragment of ulcer material.  -Negative for dysplasia or granuloma.  -CMV and HSVI/II immunostains are negative.     CK AE1/AE3 highlights epithelium.       C. Colon, 70 cm, biopsy:  -Colonic mucosa with no significant histopathologic change.       D. Colon, 60 cm, biopsy:  -Colonic mucosa with no significant histopathologic change.        E. Colon, 50 cm, biopsy:  -Colonic mucosa with no significant histopathologic change.        F. Colon, 40 cm, biopsy:  -Colonic mucosa with no significant histopathologic change.        G. Colon, 30 cm, biopsy:  -Colonic mucosa with no significant histopathologic change.        H. Colon, 20 cm, biopsy:  -Colonic mucosa with no significant histopathologic change.        I. Colon, 10 cm, biopsy:  -Colonic mucosa with no significant histopathologic change.      HPI    Review of Systems A complete review of systems is negative other than that noted above in the HPI.      Current Medications[1]  Objective   /70 (BP Location: Left arm, Patient Position: Sitting, Cuff Size: Adult)   Pulse 69   Temp 97.6 °F (36.4 °C) (Tympanic)   Ht 5' 8\" (1.727 m)   Wt 66.8 kg (147 lb 3.2 oz)   SpO2 98%   BMI 22.38 kg/m²     Physical Exam  Vitals reviewed.   HENT:      Head: Normocephalic and atraumatic.     Eyes:      " "Conjunctiva/sclera: Conjunctivae normal.     Pulmonary:      Effort: Pulmonary effort is normal.     Skin:     Coloration: Skin is not jaundiced or pale.     Neurological:      General: No focal deficit present.     Psychiatric:         Mood and Affect: Mood normal.         Behavior: Behavior normal.          Lab Results: I personally reviewed relevant lab results.       Results for orders placed during the hospital encounter of 03/18/25    Colonoscopy    Impression  The terminal ileum appeared normal.  Ulcerated side-to-side ileocolonic anastomosis in the terminal ileum; performed cold forceps biopsy  The entire colon appeared normal. Performed random biopsy using biopsy forceps.        RECOMMENDATION:  Await pathology results  Repeat colonoscopy in 3 years, due: 3/17/2028  Inflammatory bowel disease              Sam Freedman MD               [1]  Current Outpatient Medications   Medication Sig Dispense Refill   • cyanocobalamin 1,000 mcg/mL Inject 1 ml IM once per week for 4 weeks, then transition to once monthly injections. 10 mL 3   • ergocalciferol (ERGOCALCIFEROL) 1.25 MG (11689 UT) capsule Take 1 capsule (50,000 Units total) by mouth once a week 12 capsule 3   • nicotine (NICODERM CQ) 14 mg/24hr TD 24 hr patch Place 1 patch on the skin every 24 hours over 24 hours 28 patch 0   • nicotine polacrilex (NICORETTE) 2 mg gum Chew 1 each (2 mg total) as needed for smoking cessation 100 each 0   • risankizumab-rzaa (Skyrizi) 360 MG/2.4ML SOCT Inject 1 subcutaneously once every 8 weeks 2.4 mL 6   • risankizumab-rzaa (Skyrizi) 360 MG/2.4ML SOCT Take body injector (OBI) under skin every 56 days 2.4 mL 6   • SYRINGE/NEEDLE, DISP, 1 ML (BD Luer-Letty Syringe) 25G X 5/8\" 1 ML MISC For use with vitamin B12 monthly injections 30 each 0   • vitamin B-12 (VITAMIN B-12) 1,000 mcg tablet Take 1 tablet (1,000 mcg total) by mouth daily 90 tablet 3   • Azelastine HCl 137 MCG/SPRAY SOLN INSERT 1 TO 2 SPRAYS INTO EACH NOSTRIL " TWICE A DAY AS NEEDED FOR RHINITIS. USE IN EACH NOSTRIL AS DIRECTED. 90 mL 1   • brompheniramine-pseudoephedrine-DM 30-2-10 MG/5ML syrup Take 10 mL by mouth 3 (three) times a day as needed for cough (Patient not taking: Reported on 6/17/2025) 150 mL 0   • nicotine (NICODERM CQ) 21 mg/24 hr TD 24 hr patch Place 1 patch on the skin every 24 hours over 24 hours (Patient not taking: Reported on 5/30/2025) 28 patch 0     No current facility-administered medications for this visit.

## 2025-06-24 DIAGNOSIS — F17.200 TOBACCO DEPENDENCY: ICD-10-CM

## 2025-06-25 RX ORDER — NICOTINE 21 MG/24HR
1 PATCH, TRANSDERMAL 24 HOURS TRANSDERMAL EVERY 24 HOURS
Qty: 28 PATCH | Refills: 3 | Status: SHIPPED | OUTPATIENT
Start: 2025-06-25

## 2025-06-27 ENCOUNTER — HOSPITAL ENCOUNTER (OUTPATIENT)
Dept: INFUSION CENTER | Facility: HOSPITAL | Age: 35
End: 2025-06-27
Attending: INTERNAL MEDICINE
Payer: COMMERCIAL

## 2025-06-27 VITALS
TEMPERATURE: 97.4 F | SYSTOLIC BLOOD PRESSURE: 129 MMHG | DIASTOLIC BLOOD PRESSURE: 75 MMHG | RESPIRATION RATE: 18 BRPM | HEART RATE: 85 BPM | OXYGEN SATURATION: 98 %

## 2025-06-27 DIAGNOSIS — E53.8 VITAMIN B12 DEFICIENCY: Primary | ICD-10-CM

## 2025-06-27 DIAGNOSIS — E61.1 IRON DEFICIENCY: ICD-10-CM

## 2025-06-27 DIAGNOSIS — E53.8 VITAMIN B12 DEFICIENCY: ICD-10-CM

## 2025-06-27 DIAGNOSIS — K50.014 CROHN'S DISEASE OF SMALL INTESTINE WITH ABSCESS (HCC): Primary | ICD-10-CM

## 2025-06-27 PROCEDURE — 96372 THER/PROPH/DIAG INJ SC/IM: CPT

## 2025-06-27 PROCEDURE — 96365 THER/PROPH/DIAG IV INF INIT: CPT

## 2025-06-27 RX ORDER — SODIUM CHLORIDE 9 MG/ML
20 INJECTION, SOLUTION INTRAVENOUS ONCE
OUTPATIENT
Start: 2025-06-29

## 2025-06-27 RX ORDER — CYANOCOBALAMIN 1000 UG/ML
1000 INJECTION, SOLUTION INTRAMUSCULAR; SUBCUTANEOUS ONCE
Status: CANCELLED | OUTPATIENT
Start: 2025-06-27

## 2025-06-27 RX ORDER — CYANOCOBALAMIN 1000 UG/ML
1000 INJECTION, SOLUTION INTRAMUSCULAR; SUBCUTANEOUS ONCE
OUTPATIENT
Start: 2025-07-25

## 2025-06-27 RX ORDER — SODIUM CHLORIDE 9 MG/ML
20 INJECTION, SOLUTION INTRAVENOUS ONCE
Status: COMPLETED | OUTPATIENT
Start: 2025-06-27 | End: 2025-06-27

## 2025-06-27 RX ORDER — CYANOCOBALAMIN 1000 UG/ML
1000 INJECTION, SOLUTION INTRAMUSCULAR; SUBCUTANEOUS ONCE
Status: COMPLETED | OUTPATIENT
Start: 2025-06-27 | End: 2025-06-27

## 2025-06-27 RX ADMIN — CYANOCOBALAMIN 1000 MCG: 1000 INJECTION, SOLUTION INTRAMUSCULAR at 15:12

## 2025-06-27 RX ADMIN — IRON SUCROSE 200 MG: 20 INJECTION, SOLUTION INTRAVENOUS at 14:53

## 2025-06-27 RX ADMIN — SODIUM CHLORIDE 20 ML/HR: 9 INJECTION, SOLUTION INTRAVENOUS at 14:53

## 2025-06-27 NOTE — PROGRESS NOTES
Victor Hugo Hurtado  tolerated treatment well with no complications.      Victor Hugo Hurtado is aware of future appt on 7/7/2025 at 1430.     AVS printed and given to Victor Hugo Hurtado:    No (Declined by Victor Hugo Hurtado)

## 2025-07-07 ENCOUNTER — HOSPITAL ENCOUNTER (OUTPATIENT)
Dept: INFUSION CENTER | Facility: HOSPITAL | Age: 35
Discharge: HOME | End: 2025-07-07
Attending: INTERNAL MEDICINE
Payer: COMMERCIAL

## 2025-07-07 VITALS
SYSTOLIC BLOOD PRESSURE: 118 MMHG | RESPIRATION RATE: 16 BRPM | OXYGEN SATURATION: 97 % | DIASTOLIC BLOOD PRESSURE: 60 MMHG | TEMPERATURE: 98 F | HEART RATE: 85 BPM

## 2025-07-07 DIAGNOSIS — K50.014 CROHN'S DISEASE OF SMALL INTESTINE WITH ABSCESS (HCC): Primary | ICD-10-CM

## 2025-07-07 DIAGNOSIS — E53.8 VITAMIN B12 DEFICIENCY: ICD-10-CM

## 2025-07-07 DIAGNOSIS — E61.1 IRON DEFICIENCY: ICD-10-CM

## 2025-07-07 PROCEDURE — 96365 THER/PROPH/DIAG IV INF INIT: CPT

## 2025-07-07 RX ORDER — SODIUM CHLORIDE 9 MG/ML
20 INJECTION, SOLUTION INTRAVENOUS ONCE
Status: CANCELLED | OUTPATIENT
Start: 2025-07-18

## 2025-07-07 RX ORDER — SODIUM CHLORIDE 9 MG/ML
20 INJECTION, SOLUTION INTRAVENOUS ONCE
Status: COMPLETED | OUTPATIENT
Start: 2025-07-07 | End: 2025-07-07

## 2025-07-07 RX ADMIN — SODIUM CHLORIDE 20 ML/HR: 9 INJECTION, SOLUTION INTRAVENOUS at 14:47

## 2025-07-07 RX ADMIN — IRON SUCROSE 200 MG: 20 INJECTION, SOLUTION INTRAVENOUS at 14:46

## 2025-07-07 NOTE — PROGRESS NOTES
Victor Hugo Hurtado  tolerated treatment well with no complications.      Victor Hugo Hurtado is aware of future appt on 7/18 at 1430.     AVS printed and given to Victor Hugo Hurtado:  No (Declined by Victor Hugo Hurtado)

## 2025-07-18 ENCOUNTER — HOSPITAL ENCOUNTER (OUTPATIENT)
Dept: INFUSION CENTER | Facility: HOSPITAL | Age: 35
End: 2025-07-18
Attending: INTERNAL MEDICINE
Payer: COMMERCIAL

## 2025-07-18 VITALS
OXYGEN SATURATION: 98 % | HEART RATE: 76 BPM | SYSTOLIC BLOOD PRESSURE: 119 MMHG | TEMPERATURE: 96.8 F | RESPIRATION RATE: 18 BRPM | DIASTOLIC BLOOD PRESSURE: 73 MMHG

## 2025-07-18 DIAGNOSIS — E61.1 IRON DEFICIENCY: ICD-10-CM

## 2025-07-18 DIAGNOSIS — K50.014 CROHN'S DISEASE OF SMALL INTESTINE WITH ABSCESS (HCC): Primary | ICD-10-CM

## 2025-07-18 DIAGNOSIS — E53.8 VITAMIN B12 DEFICIENCY: ICD-10-CM

## 2025-07-18 PROCEDURE — 96365 THER/PROPH/DIAG IV INF INIT: CPT

## 2025-07-18 RX ORDER — SODIUM CHLORIDE 9 MG/ML
20 INJECTION, SOLUTION INTRAVENOUS ONCE
Status: COMPLETED | OUTPATIENT
Start: 2025-07-18 | End: 2025-07-18

## 2025-07-18 RX ORDER — SODIUM CHLORIDE 9 MG/ML
20 INJECTION, SOLUTION INTRAVENOUS ONCE
Status: CANCELLED | OUTPATIENT
Start: 2025-07-25

## 2025-07-18 RX ORDER — CYANOCOBALAMIN 1000 UG/ML
1000 INJECTION, SOLUTION INTRAMUSCULAR; SUBCUTANEOUS ONCE
Status: CANCELLED | OUTPATIENT
Start: 2025-07-25

## 2025-07-18 RX ADMIN — SODIUM CHLORIDE 20 ML/HR: 9 INJECTION, SOLUTION INTRAVENOUS at 14:41

## 2025-07-18 RX ADMIN — IRON SUCROSE 200 MG: 20 INJECTION, SOLUTION INTRAVENOUS at 14:41

## 2025-07-18 NOTE — PROGRESS NOTES
Pt tolerated treatment with no adv reactions; next appt 7/25 at 1430; pt left unit ambulatory with steady gait.

## 2025-07-20 DIAGNOSIS — E53.8 VITAMIN B12 DEFICIENCY: ICD-10-CM

## 2025-07-21 RX ORDER — CYANOCOBALAMIN 1000 UG/ML
INJECTION, SOLUTION INTRAMUSCULAR; SUBCUTANEOUS
Qty: 12 ML | Refills: 4 | Status: SHIPPED | OUTPATIENT
Start: 2025-07-21

## 2025-07-25 ENCOUNTER — HOSPITAL ENCOUNTER (OUTPATIENT)
Dept: INFUSION CENTER | Facility: HOSPITAL | Age: 35
End: 2025-07-25
Attending: INTERNAL MEDICINE
Payer: COMMERCIAL

## 2025-07-25 VITALS
RESPIRATION RATE: 16 BRPM | OXYGEN SATURATION: 98 % | TEMPERATURE: 97.3 F | DIASTOLIC BLOOD PRESSURE: 64 MMHG | HEART RATE: 70 BPM | SYSTOLIC BLOOD PRESSURE: 122 MMHG

## 2025-07-25 DIAGNOSIS — E53.8 VITAMIN B12 DEFICIENCY: Primary | ICD-10-CM

## 2025-07-25 DIAGNOSIS — E61.1 IRON DEFICIENCY: ICD-10-CM

## 2025-07-25 DIAGNOSIS — K50.014 CROHN'S DISEASE OF SMALL INTESTINE WITH ABSCESS (HCC): ICD-10-CM

## 2025-07-25 PROCEDURE — 96365 THER/PROPH/DIAG IV INF INIT: CPT

## 2025-07-25 PROCEDURE — 96372 THER/PROPH/DIAG INJ SC/IM: CPT

## 2025-07-25 RX ORDER — SODIUM CHLORIDE 9 MG/ML
20 INJECTION, SOLUTION INTRAVENOUS ONCE
Status: COMPLETED | OUTPATIENT
Start: 2025-07-25 | End: 2025-07-25

## 2025-07-25 RX ORDER — CYANOCOBALAMIN 1000 UG/ML
1000 INJECTION, SOLUTION INTRAMUSCULAR; SUBCUTANEOUS ONCE
Status: COMPLETED | OUTPATIENT
Start: 2025-07-25 | End: 2025-07-25

## 2025-07-25 RX ORDER — CYANOCOBALAMIN 1000 UG/ML
1000 INJECTION, SOLUTION INTRAMUSCULAR; SUBCUTANEOUS ONCE
OUTPATIENT
Start: 2025-08-22

## 2025-07-25 RX ORDER — SODIUM CHLORIDE 9 MG/ML
20 INJECTION, SOLUTION INTRAVENOUS ONCE
OUTPATIENT
Start: 2025-08-01

## 2025-07-25 RX ADMIN — SODIUM CHLORIDE 200 MG: 9 INJECTION, SOLUTION INTRAVENOUS at 14:43

## 2025-07-25 RX ADMIN — CYANOCOBALAMIN 1000 MCG: 1000 INJECTION, SOLUTION INTRAMUSCULAR at 15:02

## 2025-07-25 RX ADMIN — SODIUM CHLORIDE 20 ML/HR: 9 INJECTION, SOLUTION INTRAVENOUS at 14:43

## 2025-07-25 NOTE — PROGRESS NOTES
..Victor Hugo Hurtado  tolerated treatment well with no complications.      Victor Hugo Hurtado is aware of future appt on 8/1 at 2:30.     AVS printed and given to Victor Hugo Hurtado:  No (Declined by Victor Hugo Hurtado)

## 2025-07-30 DIAGNOSIS — K50.014 CROHN'S DISEASE OF SMALL INTESTINE WITH ABSCESS (HCC): ICD-10-CM

## 2025-07-30 DIAGNOSIS — E61.1 IRON DEFICIENCY: Primary | ICD-10-CM

## 2025-07-30 DIAGNOSIS — E53.8 VITAMIN B12 DEFICIENCY: ICD-10-CM

## 2025-07-30 RX ORDER — CYANOCOBALAMIN 1000 UG/ML
1000 INJECTION, SOLUTION INTRAMUSCULAR; SUBCUTANEOUS ONCE
OUTPATIENT
Start: 2025-07-30 | End: 2025-07-30

## 2025-07-30 RX ORDER — SODIUM CHLORIDE 9 MG/ML
20 INJECTION, SOLUTION INTRAVENOUS ONCE
OUTPATIENT
Start: 2025-07-30

## 2025-08-01 ENCOUNTER — HOSPITAL ENCOUNTER (OUTPATIENT)
Dept: INFUSION CENTER | Facility: HOSPITAL | Age: 35
Discharge: HOME | End: 2025-08-01
Attending: INTERNAL MEDICINE
Payer: COMMERCIAL

## 2025-08-01 VITALS
HEART RATE: 61 BPM | RESPIRATION RATE: 16 BRPM | DIASTOLIC BLOOD PRESSURE: 74 MMHG | SYSTOLIC BLOOD PRESSURE: 120 MMHG | TEMPERATURE: 96.8 F | OXYGEN SATURATION: 98 %

## 2025-08-01 DIAGNOSIS — E61.1 IRON DEFICIENCY: ICD-10-CM

## 2025-08-01 DIAGNOSIS — E53.8 VITAMIN B12 DEFICIENCY: ICD-10-CM

## 2025-08-01 DIAGNOSIS — K50.014 CROHN'S DISEASE OF SMALL INTESTINE WITH ABSCESS (HCC): Primary | ICD-10-CM

## 2025-08-01 PROCEDURE — 96365 THER/PROPH/DIAG IV INF INIT: CPT

## 2025-08-01 RX ORDER — SODIUM CHLORIDE 9 MG/ML
20 INJECTION, SOLUTION INTRAVENOUS ONCE
Status: CANCELLED | OUTPATIENT
Start: 2025-08-08

## 2025-08-01 RX ORDER — SODIUM CHLORIDE 9 MG/ML
20 INJECTION, SOLUTION INTRAVENOUS ONCE
Status: COMPLETED | OUTPATIENT
Start: 2025-08-01 | End: 2025-08-01

## 2025-08-01 RX ADMIN — IRON SUCROSE 200 MG: 20 INJECTION, SOLUTION INTRAVENOUS at 14:50

## 2025-08-01 RX ADMIN — SODIUM CHLORIDE 20 ML/HR: 9 INJECTION, SOLUTION INTRAVENOUS at 14:50

## 2025-08-06 ENCOUNTER — OFFICE VISIT (OUTPATIENT)
Dept: URGENT CARE | Facility: CLINIC | Age: 35
End: 2025-08-06
Payer: COMMERCIAL

## 2025-08-06 VITALS
RESPIRATION RATE: 15 BRPM | TEMPERATURE: 97 F | DIASTOLIC BLOOD PRESSURE: 72 MMHG | HEART RATE: 72 BPM | SYSTOLIC BLOOD PRESSURE: 128 MMHG | BODY MASS INDEX: 22.22 KG/M2 | WEIGHT: 150 LBS | HEIGHT: 69 IN | OXYGEN SATURATION: 99 %

## 2025-08-06 DIAGNOSIS — L30.9 DERMATITIS: Primary | ICD-10-CM

## 2025-08-06 PROCEDURE — 99213 OFFICE O/P EST LOW 20 MIN: CPT | Performed by: PHYSICIAN ASSISTANT

## 2025-08-06 RX ORDER — HYDROCORTISONE 25 MG/G
CREAM TOPICAL 2 TIMES DAILY
Qty: 28 G | Refills: 0 | Status: SHIPPED | OUTPATIENT
Start: 2025-08-06

## 2025-08-06 RX ORDER — PREDNISONE 20 MG/1
40 TABLET ORAL DAILY
Qty: 10 TABLET | Refills: 0 | Status: SHIPPED | OUTPATIENT
Start: 2025-08-06 | End: 2025-08-11

## (undated) DEVICE — EXOFIN PRECISION PEN HIGH VISCOSITY TOPICAL SKIN ADHESIVE: Brand: EXOFIN PRECISION PEN, 1G

## (undated) DEVICE — SUT VICRYL 3-0 SH 27 IN J416H

## (undated) DEVICE — GLOVE SRG BIOGEL ECLIPSE 6

## (undated) DEVICE — WOUND RETRACTOR AND PROTECTOR: Brand: ALEXIS O WOUND PROTECTOR-RETRACTOR

## (undated) DEVICE — INTENDED FOR TISSUE SEPARATION, AND OTHER PROCEDURES THAT REQUIRE A SHARP SURGICAL BLADE TO PUNCTURE OR CUT.: Brand: BARD-PARKER SAFETY BLADES SIZE 15, STERILE

## (undated) DEVICE — SUT PDS PLUS 1 CTX 36IN PDP371T

## (undated) DEVICE — TUBING SMOKE EVAC W/FILTRATION DEVICE PLUMEPORT ACTIV

## (undated) DEVICE — SUT MONOCRYL 4-0 PS-2 18 IN Y496G

## (undated) DEVICE — TRAY FOLEY 16FR URIMETER SURESTEP

## (undated) DEVICE — TOWEL SET X-RAY

## (undated) DEVICE — SUT VICRYL 0 REEL 54 IN J287G

## (undated) DEVICE — ADHESIVE SKIN HIGH VISCOSITY EXOFIN 1ML

## (undated) DEVICE — TROCARS: Brand: KII® BALLOON BLUNT TIP SYSTEM

## (undated) DEVICE — PLUMEPEN PRO 10FT

## (undated) DEVICE — Device: Brand: DEFENDO AIR/WATER/SUCTION AND BIOPSY VALVE

## (undated) DEVICE — INSUFLATION TUBING INSUFLOW (LEXION)

## (undated) DEVICE — 40595 XL TRENDELENBURG POSITIONING KIT: Brand: 40595 XL TRENDELENBURG POSITIONING KIT

## (undated) DEVICE — ENSEAL X1 TISSUE SEALER, CURVED JAW, 25 CM SHAFT LENGTH: Brand: ENSEAL

## (undated) DEVICE — 5 MM CURVED DISSECTORS WITH MONOPOLAR CAUTERY: Brand: ENDOPATH

## (undated) DEVICE — ENSEAL X1 TISSUE SEALER, CURVED JAW, 37 CM SHAFT LENGTH: Brand: ENSEAL

## (undated) DEVICE — BETHLEHEM MAJOR GENERAL PACK: Brand: CARDINAL HEALTH

## (undated) DEVICE — PROXIMATE LINEAR CUTTER RELOADS (STANDARD)-100MM: Brand: PROXIMATE

## (undated) DEVICE — GLOVE INDICATOR UNDERGLOVE SZ 6 BLUE

## (undated) DEVICE — 2, DISPOSABLE SUCTION/IRRIGATOR WITHOUT DISPOSABLE TIP: Brand: STRYKEFLOW

## (undated) DEVICE — PROXIMATE RELOADABLE LINEAR CUTTER WITH SAFETY LOCK-OUT, 100MM: Brand: PROXIMATE

## (undated) DEVICE — ANTIBACTERIAL UNDYED BRAIDED (POLYGLACTIN 910), SYNTHETIC ABSORBABLE SUTURE: Brand: COATED VICRYL

## (undated) DEVICE — CO2 AND WATER TUBING/CAP SET FOR OLYMPUS® SCOPES & UCR: Brand: ERBE

## (undated) DEVICE — VISUALIZATION SYSTEM: Brand: CLEARIFY

## (undated) DEVICE — POOLE SUCTION HANDLE: Brand: CARDINAL HEALTH

## (undated) DEVICE — TUBING SUCTION 5MM X 12 FT

## (undated) DEVICE — TRAVELKIT CONTAINS FIRST STEP KIT (200ML EP-4 KIT) AND SOILED SCOPE BAG - 1 KIT: Brand: TRAVELKIT CONTAINS FIRST STEP KIT AND SOILED SCOPE BAG

## (undated) DEVICE — MEDI-VAC YANK SUCT HNDL W/TPRD BULBOUS TIP: Brand: CARDINAL HEALTH

## (undated) DEVICE — 3M™ IOBAN™ 2 ANTIMICROBIAL INCISE DRAPE 6650EZ: Brand: IOBAN™ 2